# Patient Record
Sex: FEMALE | Race: OTHER | URBAN - METROPOLITAN AREA
[De-identification: names, ages, dates, MRNs, and addresses within clinical notes are randomized per-mention and may not be internally consistent; named-entity substitution may affect disease eponyms.]

---

## 2022-01-13 ENCOUNTER — INPATIENT (INPATIENT)
Facility: HOSPITAL | Age: 37
LOS: 11 days | Discharge: HOME | DRG: 270 | End: 2022-01-25
Attending: INTERNAL MEDICINE | Admitting: HOSPITALIST
Payer: MEDICAID

## 2022-01-13 VITALS
SYSTOLIC BLOOD PRESSURE: 98 MMHG | WEIGHT: 217.38 LBS | RESPIRATION RATE: 18 BRPM | DIASTOLIC BLOOD PRESSURE: 60 MMHG | HEIGHT: 61 IN | HEART RATE: 127 BPM

## 2022-01-13 LAB
ALBUMIN SERPL ELPH-MCNC: 3.5 G/DL — SIGNIFICANT CHANGE UP (ref 3.3–5)
ALP SERPL-CCNC: 246 U/L — HIGH (ref 40–120)
ALT FLD-CCNC: 353 U/L — HIGH (ref 10–45)
ANION GAP SERPL CALC-SCNC: 13 MMOL/L — SIGNIFICANT CHANGE UP (ref 5–17)
APTT BLD: 27.8 SEC — SIGNIFICANT CHANGE UP (ref 27.5–35.5)
AST SERPL-CCNC: 138 U/L — HIGH (ref 10–40)
BASOPHILS # BLD AUTO: 0.02 K/UL — SIGNIFICANT CHANGE UP (ref 0–0.2)
BASOPHILS NFR BLD AUTO: 0.2 % — SIGNIFICANT CHANGE UP (ref 0–2)
BILIRUB SERPL-MCNC: 0.6 MG/DL — SIGNIFICANT CHANGE UP (ref 0.2–1.2)
BLD GP AB SCN SERPL QL: NEGATIVE — SIGNIFICANT CHANGE UP
BLD GP AB SCN SERPL QL: NEGATIVE — SIGNIFICANT CHANGE UP
BUN SERPL-MCNC: 13 MG/DL — SIGNIFICANT CHANGE UP (ref 7–23)
CALCIUM SERPL-MCNC: 8.4 MG/DL — SIGNIFICANT CHANGE UP (ref 8.4–10.5)
CHLORIDE SERPL-SCNC: 97 MMOL/L — SIGNIFICANT CHANGE UP (ref 96–108)
CK MB CFR SERPL CALC: 84.8 NG/ML — HIGH (ref 0–6.7)
CK SERPL-CCNC: 473 U/L — HIGH (ref 25–170)
CO2 SERPL-SCNC: 19 MMOL/L — LOW (ref 22–31)
CREAT SERPL-MCNC: 0.72 MG/DL — SIGNIFICANT CHANGE UP (ref 0.5–1.3)
CRP SERPL-MCNC: 114.6 MG/L — HIGH (ref 0–4)
D DIMER BLD IA.RAPID-MCNC: 680 NG/ML DDU — HIGH
EOSINOPHIL # BLD AUTO: 0.39 K/UL — SIGNIFICANT CHANGE UP (ref 0–0.5)
EOSINOPHIL NFR BLD AUTO: 4 % — SIGNIFICANT CHANGE UP (ref 0–6)
ERYTHROCYTE [SEDIMENTATION RATE] IN BLOOD: 72 MM/HR — HIGH
GLUCOSE SERPL-MCNC: 118 MG/DL — HIGH (ref 70–99)
HCG SERPL-ACNC: <0 MIU/ML — SIGNIFICANT CHANGE UP
HCT VFR BLD CALC: 31.3 % — LOW (ref 34.5–45)
HGB BLD-MCNC: 9.9 G/DL — LOW (ref 11.5–15.5)
IMM GRANULOCYTES NFR BLD AUTO: 0.4 % — SIGNIFICANT CHANGE UP (ref 0–1.5)
INR BLD: 1.45 — HIGH (ref 0.88–1.16)
LACTATE SERPL-SCNC: 2.4 MMOL/L — HIGH (ref 0.5–2)
LYMPHOCYTES # BLD AUTO: 1.89 K/UL — SIGNIFICANT CHANGE UP (ref 1–3.3)
LYMPHOCYTES # BLD AUTO: 19.4 % — SIGNIFICANT CHANGE UP (ref 13–44)
MAGNESIUM SERPL-MCNC: 2.5 MG/DL — SIGNIFICANT CHANGE UP (ref 1.6–2.6)
MCHC RBC-ENTMCNC: 28.2 PG — SIGNIFICANT CHANGE UP (ref 27–34)
MCHC RBC-ENTMCNC: 31.6 GM/DL — LOW (ref 32–36)
MCV RBC AUTO: 89.2 FL — SIGNIFICANT CHANGE UP (ref 80–100)
MONOCYTES # BLD AUTO: 0.68 K/UL — SIGNIFICANT CHANGE UP (ref 0–0.9)
MONOCYTES NFR BLD AUTO: 7 % — SIGNIFICANT CHANGE UP (ref 2–14)
NEUTROPHILS # BLD AUTO: 6.72 K/UL — SIGNIFICANT CHANGE UP (ref 1.8–7.4)
NEUTROPHILS NFR BLD AUTO: 69 % — SIGNIFICANT CHANGE UP (ref 43–77)
NRBC # BLD: 0 /100 WBCS — SIGNIFICANT CHANGE UP (ref 0–0)
NT-PROBNP SERPL-SCNC: HIGH PG/ML (ref 0–300)
PHOSPHATE SERPL-MCNC: 2.7 MG/DL — SIGNIFICANT CHANGE UP (ref 2.5–4.5)
PLATELET # BLD AUTO: 421 K/UL — HIGH (ref 150–400)
POTASSIUM SERPL-MCNC: 4.2 MMOL/L — SIGNIFICANT CHANGE UP (ref 3.5–5.3)
POTASSIUM SERPL-SCNC: 4.2 MMOL/L — SIGNIFICANT CHANGE UP (ref 3.5–5.3)
PROT SERPL-MCNC: 6.7 G/DL — SIGNIFICANT CHANGE UP (ref 6–8.3)
PROTHROM AB SERPL-ACNC: 17.1 SEC — HIGH (ref 10.6–13.6)
RBC # BLD: 3.51 M/UL — LOW (ref 3.8–5.2)
RBC # FLD: 15.9 % — HIGH (ref 10.3–14.5)
RH IG SCN BLD-IMP: POSITIVE — SIGNIFICANT CHANGE UP
RH IG SCN BLD-IMP: POSITIVE — SIGNIFICANT CHANGE UP
SODIUM SERPL-SCNC: 129 MMOL/L — LOW (ref 135–145)
TROPONIN T SERPL-MCNC: 5.83 NG/ML — CRITICAL HIGH (ref 0–0.01)
WBC # BLD: 9.74 K/UL — SIGNIFICANT CHANGE UP (ref 3.8–10.5)
WBC # FLD AUTO: 9.74 K/UL — SIGNIFICANT CHANGE UP (ref 3.8–10.5)

## 2022-01-13 PROCEDURE — 71045 X-RAY EXAM CHEST 1 VIEW: CPT | Mod: 26

## 2022-01-13 PROCEDURE — 99291 CRITICAL CARE FIRST HOUR: CPT

## 2022-01-13 PROCEDURE — 99292 CRITICAL CARE ADDL 30 MIN: CPT

## 2022-01-13 RX ORDER — DEXTROSE 50 % IN WATER 50 %
15 SYRINGE (ML) INTRAVENOUS ONCE
Refills: 0 | Status: DISCONTINUED | OUTPATIENT
Start: 2022-01-13 | End: 2022-01-22

## 2022-01-13 RX ORDER — DEXTROSE 50 % IN WATER 50 %
25 SYRINGE (ML) INTRAVENOUS ONCE
Refills: 0 | Status: DISCONTINUED | OUTPATIENT
Start: 2022-01-13 | End: 2022-01-22

## 2022-01-13 RX ORDER — CHLORHEXIDINE GLUCONATE 213 G/1000ML
1 SOLUTION TOPICAL
Refills: 0 | Status: DISCONTINUED | OUTPATIENT
Start: 2022-01-13 | End: 2022-01-22

## 2022-01-13 RX ORDER — ONDANSETRON 8 MG/1
4 TABLET, FILM COATED ORAL ONCE
Refills: 0 | Status: COMPLETED | OUTPATIENT
Start: 2022-01-13 | End: 2022-01-13

## 2022-01-13 RX ORDER — PANTOPRAZOLE SODIUM 20 MG/1
40 TABLET, DELAYED RELEASE ORAL
Refills: 0 | Status: DISCONTINUED | OUTPATIENT
Start: 2022-01-13 | End: 2022-01-25

## 2022-01-13 RX ORDER — GLUCAGON INJECTION, SOLUTION 0.5 MG/.1ML
1 INJECTION, SOLUTION SUBCUTANEOUS ONCE
Refills: 0 | Status: DISCONTINUED | OUTPATIENT
Start: 2022-01-13 | End: 2022-01-22

## 2022-01-13 RX ORDER — SIMETHICONE 80 MG/1
80 TABLET, CHEWABLE ORAL ONCE
Refills: 0 | Status: COMPLETED | OUTPATIENT
Start: 2022-01-13 | End: 2022-01-14

## 2022-01-13 RX ORDER — SODIUM CHLORIDE 9 MG/ML
1000 INJECTION, SOLUTION INTRAVENOUS
Refills: 0 | Status: DISCONTINUED | OUTPATIENT
Start: 2022-01-13 | End: 2022-01-22

## 2022-01-13 RX ORDER — SODIUM NITROPRUSSIDE 50 MG/2ML
0.5 INJECTION INTRAVENOUS
Qty: 100 | Refills: 0 | Status: DISCONTINUED | OUTPATIENT
Start: 2022-01-13 | End: 2022-01-14

## 2022-01-13 RX ORDER — INFLUENZA VIRUS VACCINE 15; 15; 15; 15 UG/.5ML; UG/.5ML; UG/.5ML; UG/.5ML
0.5 SUSPENSION INTRAMUSCULAR ONCE
Refills: 0 | Status: COMPLETED | OUTPATIENT
Start: 2022-01-13 | End: 2022-01-13

## 2022-01-13 RX ORDER — DEXTROSE 50 % IN WATER 50 %
12.5 SYRINGE (ML) INTRAVENOUS ONCE
Refills: 0 | Status: DISCONTINUED | OUTPATIENT
Start: 2022-01-13 | End: 2022-01-22

## 2022-01-13 RX ADMIN — SODIUM NITROPRUSSIDE 7.4 MICROGRAM(S)/KG/MIN: 50 INJECTION INTRAVENOUS at 22:19

## 2022-01-13 RX ADMIN — ONDANSETRON 4 MILLIGRAM(S): 8 TABLET, FILM COATED ORAL at 20:55

## 2022-01-13 NOTE — PATIENT PROFILE ADULT - FALL HARM RISK - HARM RISK INTERVENTIONS

## 2022-01-13 NOTE — H&P ADULT - NSHPPHYSICALEXAM_GEN_ALL_CORE
Gen: NAD, laying in bed, weak-appearing, speaking in full sentences  HEENT: PERRL, anicteric sclera, no JVD, no thyromegaly  Cardio: +S1/S2, tachycardic, no murmurs  Resp: decreased breath sounds over right base  GI: +BS x4, NT/ND  Ext: b/l LE edema  Vasc: 2+ peripheral pulses, lower extremities cool   Neuro: AAOx3, no focal deficits Gen: NAD, laying in bed, weak-appearing, speaking in full sentences  HEENT: PERRL, anicteric sclera, no JVD, no thyromegaly  Cardio: +S1/S2, tachycardic, no murmurs  Resp: decreased breath sounds over right base  GI: +BS x4, ND, mild RUQ tenderness  Ext: b/l LE edema  Vasc: 2+ peripheral pulses, lower extremities cool   Neuro: AAOx3, no focal deficits

## 2022-01-13 NOTE — H&P ADULT - HISTORY OF PRESENT ILLNESS
35 yo F w/ a PMHx of      Hgb9.9, plt 421, Na 129, Bicarb 19, BUN13, Cr 0.72, , , , Lactate 2.4, .6, , CKMB 84.8, Trop T 5.83     35 yo F w/ no PMHx prese    2 weeks ago, pt started having back pain (worsened by lying down and sitting up) which prompted her to go to the ED. She was treated for a PNA and discharged home. Sxs did not improve. 2 days ago, she started having epigastric pain, myalgias, chills, cough, fever, and generalized fatigue. Took her BP at home, it was low which promoted her to return to the ED. In the ED, febrile to 102.4, tachycardic to 140, with EKG showing diffuse ST elevations and tropinemia (35286-->9524). Admitted for cardiac workup. Echo (1/10/22) revealed EF 30-35% with pericardial effusion. Transferred to [facility] for further work up.      Vitals: T98.4, , BP 92/60 (MAP 72), RR 20, sPO2 100% (2L NC)  Labs: Hgb9.9, plt 421, Na 129, Bicarb 19, BUN13, Cr 0.72, , , , Lactate 2.4, .6, ESR 72, , CKMB 84.8, Trop T 5.83  Imaging:    EKG: Sinus tachycardia, with diffuse ST elevations, ST depressions in aVR and V1    CXR: Small right-sided effusion, Cardiomegaly.  Transfer from [place]  35 yo F w/ no PMHx presents  . 2 weeks ago, pt started having back pain (worsened by lying down and sitting up) which prompted her to go to the ED. She was treated for a PNA and discharged home. Sxs did not improve. 2 days ago, she started having epigastric pain, myalgias, chills, cough, fever, and generalized fatigue. Took her BP at home, it was low which promoted her to return to the ED. In the ED, febrile to 102.4, tachycardic to 140, with EKG showing diffuse ST elevations and tropinemia (99732-->9524). Admitted for cardiac workup. Echo (1/10/22) revealed EF 30-35% with pericardial effusion. Transferred to [facility] for further work up. Pt reports left sided non-radiating chest pain (scaled 3/10) worsened with coughing. Also endorses weakness, fatigue, epigastric pain, nausea, leg swelling (which started today). Denies orthopnea, PND. Reports heat intolerance, and tachycardia which started 2 weeks ago, and irregular menstrual periods. Currently menstruating with heavy flow soaking 5 pads per day.       Vitals: T98.4, , BP 92/60 (MAP 72), RR 20, sPO2 100% (2L NC)  Labs: Hgb9.9, plt 421, Na 129, Bicarb 19, BUN13, Cr 0.72, , , , Lactate 2.4, .6, ESR 72, , CKMB 84.8, Trop T 5.83  Imaging:    EKG: Sinus tachycardia, with diffuse ST elevations, ST depressions in aVR and V1    CXR: Small right-sided effusion, Cardiomegaly.  Transfer from [place]  37 yo F w/ no PMHx presents from [place] for further cardiac work up. 2 weeks ago, pt started having back pain (worsened by lying down and sitting up) which prompted her to go to the ED. She was treated for a PNA (azithromycin) and discharged home. Sxs did not improve. 2 days ago, she started having epigastric pain, myalgias, chills, cough, fever, and generalized fatigue. Took her BP at home, it was low which promoted her to return to the ED. In the ED, febrile to 102.4, tachycardic to 140, with EKG showing diffuse ST elevations and tropinemia (21262-->9524). Admitted for cardiac workup. Echo (1/10/22) revealed EF 30-35% with pericardial effusion. Admitted for perimyocarditis and then transferred to [facility] for further work up. Upon arrival, pt reports left sided non-radiating chest pain (scaled 3/10) worsened with coughing. Also endorses weakness, fatigue, epigastric pain, nausea, leg swelling (R>>L, which started yesterday). Denies orthopnea, PND. Reports heat intolerance, and tachycardia which started 2 weeks ago, and irregular menstrual periods. Currently menstruating with heavy flow soaking 5 pads per day, and has gotten her period twice this month. Denies hematemesis, melena, hematochezia and hematuria. ROS otherwise negative. Of note pt denies any personal family hx of any autoimmune, thyroid or liver disorders. Pt also denies any sick contacts or recent travel. Pt is unvaccinated for COVID.       Vitals: T98.4, , BP 92/60 (MAP 72), RR 20, sPO2 100% (2L NC)  Labs: Hgb9.9, plt 421, Na 129, Bicarb 19, BUN13, Cr 0.72, , , , Lactate 2.4, .6, ESR 72, , CKMB 84.8, Trop T 5.83  Imaging:    EKG: Sinus tachycardia, with diffuse ST elevations, ST depressions in aVR and V1    CXR: Small right-sided effusion, Cardiomegaly.  Transfer from [place]  35 yo F w/ no PMHx presents from [place] for further cardiac work up. 2 weeks ago, pt started having back pain (worsened by lying down and sitting up) which prompted her to go to the ED. She was treated for a PNA (azithromycin) and discharged home. Sxs did not improve. 2 days ago, she started having epigastric pain, myalgias, chills, cough, fever, and generalized fatigue. Took her BP at home, it was low which promoted her to return to the ED. In the ED, febrile to 102.4, tachycardic to 140, with EKG showing diffuse ST elevations and tropinemia (16842-->9524). Admitted for cardiac workup. Echo (1/10/22) revealed EF 30-35% with pericardial effusion. Admitted for perimyocarditis and then transferred to [facility] for further work up. Upon arrival, pt reports left sided non-radiating chest pain (scaled 3/10) worsened with coughing. Also endorses weakness, fatigue, epigastric pain, nausea, leg swelling (R>>L, which started yesterday). Denies orthopnea, PND. Reports heat intolerance, and tachycardia which started 2 weeks ago, and irregular menstrual periods. Currently menstruating with heavy flow soaking 5 pads per day, and has gotten her period twice this month. Denies hematemesis, melena, hematochezia and hematuria. ROS otherwise negative. Of note pt denies any personal family hx of any autoimmune, thyroid or liver disorders. Pt also denies any sick contacts or recent travel. Pt is unvaccinated for COVID.       Vitals: T98.4, , BP 92/60 (MAP 72), RR 20, sPO2 100% (2L NC)  Labs: Hgb9.9, plt 421, Na 129, Bicarb 19, BUN13, Cr 0.72, , , , Lactate 2.4, .6, ESR 72, , CKMB 84.8, Trop T 5.83, BNP 65991.  Imaging:    EKG: Sinus tachycardia, with diffuse ST elevations, ST depressions in aVR and V1    CXR: Small right-sided effusion, Cardiomegaly.  Transfer from [place]

## 2022-01-13 NOTE — H&P ADULT - ASSESSMENT
35 yo F w/ no PMHx admitted to [place] for perimyocarditis and then transferred to [facility] for further work up.     Neuro  No active issues 35 yo F w/ no PMHx admitted to [place] for perimyocarditis and then transferred to [facility] for further work up.     Neuro  No active issues    Cardiac  #Cardiogenic shock  Lactate 2.4    #Perimyocarditis     Pulm  #r/o PE  Pleuritic chest pain, tachycardia, immobilization during hospitalization. Subjectively R>L lower extremity swelling. D-dimer 680. Right sided pleural effusion.  -f/u doppler LEs  -continue to monitor respiratory status     #Pleural effusion  CXR reveals small right-sided pleural effusion. Likely 2/2 congestion i/s/o cardiogenic shock, r/o PE  -See plan above (cardiogenic shock, r/o PE)  -Continue to monitor     Renal  #Hyponatremia  Na 129. AOx3.   -Continue to monitor    GI  #Transaminitis     Heme-Onc  #Anemia     Endocrine  No active issues    ID  No active issues    Other  F: tolerating PO, no IVF  E: replete K<4, Mg<2  N: NPO after midnight (possible cardiac intervention)    VTE Prophylaxis: SCDs (possible cardiac intervention)  GI: Pantoprazole 40mg PO daily  C: Full Code  D: CCU   37 yo F w/ no PMHx admitted to [place] for perimyocarditis and then transferred to [facility] for further work up.     Neuro  No active issues    Cardiac  #Cardiogenic shock  Echo at [place] (1/10/22) revealed EF 30-35% with pericardial effusion  Lactate 2.4. Pro-BNP 41595. Elevated LFTs.  Likely 2/2 perimyocarditis  -Nipride gtt for afterload reduction (Goal MAP >65) --no inotropes as pt tachycardic   -Diuresis prn  -consider ischemic evaluation    #Perimyocarditis   Pleuritic chest pain with associated myalgias, chills, cough, fever, and generalized fatigue  , CKMB 84.8, Trop T 5.83  .6, ESR 72  EKG with diffuse ST elevations, and ST depressions in leads aVR and V1  Etiology likely viral versus autoimmune  -f/u RVP, COVID Ab's, COVID PCR, Coxsackie, HIV  -f/u MILAN  -f/u Urine Legionella, Urine Streptococcus  -f/u Urine drug screen  -f/u TSH  -colchicine???    Pulm  #r/o PE  Pleuritic chest pain, tachycardia, immobilization during previous hospitalization. Subjectively R>L lower extremity swelling. D-dimer 680. Right sided pleural effusion.  -f/u doppler LEs  -continue to monitor respiratory status     #Pleural effusion  CXR reveals small right-sided pleural effusion. Likely 2/2 congestion i/s/o cardiogenic shock, r/o PE  -See plan above (cardiogenic shock, r/o PE)  -Continue to monitor     Renal  #Hyponatremia  Na 129. Asx. AOx3.   -Continue to monitor BNP  -If pt becomes symptomatic, consider ordering serum/urine osmolality and urine sodium    GI  #Transaminitis   , , . Likely 2/2 congestive hepatopathy versus ischemia i/s/o cardiogenic shock  -continue to monitor  -f/u abdominal ultrasound  -f/u acetaminophen level  -f/u hepatitis panel    Heme-Onc  #Anemia   Hbg 9.9. No hx of anemia. Menstruated twice this month. No other signs of bleeding.   - f/u iron studies  - trend CBC  - maintain active T&S  - transfuse for Hgb <7     Endocrine  No active issues    ID  No active issues    Other  F: tolerating PO, no IVF  E: replete K<4, Mg<2  N: NPO after midnight (possible cardiac intervention)    VTE Prophylaxis: SCDs (possible cardiac intervention)  GI: Pantoprazole 40mg PO daily  C: Full Code  D: CCU   37 yo F w/ no PMHx admitted to [place] for perimyocarditis and then transferred to [facility] for further work up.    Neuro  No active issues    Cardiac  #Cardiogenic shock  Echo at [place] (1/10/22) revealed EF 30-35% with pericardial effusion  Lactate 2.4. Pro-BNP 13842. Elevated LFTs.  Likely 2/2 perimyocarditis  -Nipride gtt for afterload reduction (Goal MAP >65) --no inotropes as pt tachycardic   -Diuresis prn  -consider ischemic evaluation  -f/u echo     #Perimyocarditis   Pleuritic chest pain with associated myalgias, chills, cough, fever, and generalized fatigue  , CKMB 84.8, Trop T 5.83  .6, ESR 72  EKG with diffuse ST elevations, and ST depressions in leads aVR and V1  Etiology likely viral versus autoimmune  -f/u RVP, COVID Ab's, COVID PCR, Coxsackie, HIV  -f/u MILAN  -f/u Urine Legionella, Urine Streptococcus  -f/u Urine drug screen  -f/u TSH  -consider colchicine if chest pain worsens    Pulm  #r/o PE  Pleuritic chest pain, tachycardia, immobilization during previous hospitalization. Subjectively R>L lower extremity swelling. D-dimer 680. Right sided pleural effusion.  -f/u doppler LEs  -continue to monitor respiratory status     #Pleural effusion  CXR reveals small right-sided pleural effusion. Likely 2/2 congestion i/s/o cardiogenic shock, r/o PE  -See plan above (cardiogenic shock, r/o PE)  -Continue to monitor     Renal  #Hyponatremia  Na 129. Asx. AOx3.   -Continue to monitor BNP  -If pt becomes symptomatic, consider ordering serum/urine osmolality and urine sodium    GI  #Transaminitis   , , . Likely 2/2 congestive hepatopathy versus ischemia i/s/o cardiogenic shock  -continue to monitor  -f/u abdominal ultrasound  -f/u acetaminophen level  -f/u hepatitis panel    Heme-Onc  #Anemia   Hbg 9.9. No hx of anemia. Menstruated twice this month. No other signs of bleeding.   - f/u iron studies  - trend CBC  - maintain active T&S  - transfuse for Hgb <7     Endocrine  No active issues    ID  No active issues    Other  F: tolerating PO, no IVF  E: replete K<4, Mg<2  N: NPO after midnight (possible cardiac intervention)    VTE Prophylaxis: SCDs (possible cardiac intervention)  GI: Pantoprazole 40mg PO daily  C: Full Code  D: CCU   35 yo F w/ no PMHx admitted to [place] for perimyocarditis and then transferred to [facility] for further work up.    Neuro  No active issues    Cardiac  #Cardiogenic shock  Echo at [place] (1/10/22) revealed EF 30-35% with pericardial effusion  Lactate 2.4. Pro-BNP 03625. Elevated LFTs. Cool on exam. Hypotensive with likely compensatory tachycardia  Likely 2/2 perimyocarditis  -Nipride gtt for afterload reduction (Goal MAP >65) --no inotropes as pt tachycardic   -Diuresis prn  -consider ischemic evaluation  -f/u echo   -strict I/Os    #Perimyocarditis   Pleuritic chest pain with associated myalgias, chills, cough, fever, and generalized fatigue  , CKMB 84.8, Trop T 5.83  .6, ESR 72  EKG with diffuse ST elevations, and ST depressions in leads aVR and V1  Etiology likely viral versus autoimmune  -f/u RVP, COVID Ab's, COVID PCR, Coxsackie, HIV  -f/u MILAN  -f/u Urine Legionella, Urine Streptococcus  -f/u Urine drug screen  -f/u TSH  -consider colchicine if chest pain worsens    Pulm  #r/o PE  Pleuritic chest pain, tachycardia, immobilization during previous hospitalization. Wells 3 (HR, immobilization). Subjectively R>L lower extremity swelling. D-dimer 680. Right sided pleural effusion.  -f/u doppler LEs  -continue to monitor respiratory status     #Pleural effusion  CXR reveals small right-sided pleural effusion. Likely 2/2 congestion i/s/o cardiogenic shock, r/o PE  -See plan above (cardiogenic shock, r/o PE)  -Continue to monitor     Renal  #Hyponatremia  Na 129. Asx. AOx3.   -Continue to monitor BNP  -If pt becomes symptomatic, consider ordering serum/urine osmolality and urine sodium    GI  #Transaminitis   , , . Likely 2/2 congestive hepatopathy versus ischemia i/s/o cardiogenic shock  -continue to monitor  -f/u abdominal ultrasound  -f/u acetaminophen level  -f/u hepatitis panel    Heme-Onc  #Anemia   Hbg 9.9. No hx of anemia. Currently menstruating. No other signs of bleeding.   - f/u iron studies  - trend CBC  - maintain active T&S  - transfuse for Hgb <7     Endocrine  No active issues    ID  No active issues    Other  F: tolerating PO, no IVF  E: replete K<4, Mg<2  N: NPO after midnight (possible cardiac intervention)    VTE Prophylaxis: SCDs (possible cardiac intervention)  GI: Pantoprazole 40mg PO daily  C: Full Code  D: CCU

## 2022-01-13 NOTE — H&P ADULT - NSHPLABSRESULTS_GEN_ALL_CORE
.  LABS:                         9.9    9.74  )-----------( 421      ( 13 Jan 2022 19:29 )             31.3     01-13    129<L>  |  97  |  13  ----------------------------<  118<H>  4.2   |  19<L>  |  0.72    Ca    8.4      13 Jan 2022 19:29  Phos  2.7     01-13  Mg     2.5     01-13    TPro  6.7  /  Alb  3.5  /  TBili  0.6  /  DBili  x   /  AST  138<H>  /  ALT  353<H>  /  AlkPhos  246<H>  01-13    PT/INR - ( 13 Jan 2022 19:29 )   PT: 17.1 sec;   INR: 1.45          PTT - ( 13 Jan 2022 19:29 )  PTT:27.8 sec    CARDIAC MARKERS ( 13 Jan 2022 19:29 )  x     / 5.83 ng/mL / 473 U/L / x     / 84.8 ng/mL        Lactate, Blood: 2.4 mmol/L (01-13 @ 19:26)      RADIOLOGY, EKG & ADDITIONAL TESTS: Reviewed.

## 2022-01-13 NOTE — H&P ADULT - NSHPSOCIALHISTORY_GEN_ALL_CORE
Tobacco: None  EtOH: Socially  Recreational drug Use: None  Living situation: Lives with father and 2 children

## 2022-01-14 DIAGNOSIS — I30.9 ACUTE PERICARDITIS, UNSPECIFIED: ICD-10-CM

## 2022-01-14 DIAGNOSIS — R74.01 ELEVATION OF LEVELS OF LIVER TRANSAMINASE LEVELS: ICD-10-CM

## 2022-01-14 DIAGNOSIS — I50.21 ACUTE SYSTOLIC (CONGESTIVE) HEART FAILURE: ICD-10-CM

## 2022-01-14 LAB
A1C WITH ESTIMATED AVERAGE GLUCOSE RESULT: 5.2 % — SIGNIFICANT CHANGE UP (ref 4–5.6)
ALBUMIN SERPL ELPH-MCNC: 3.3 G/DL — SIGNIFICANT CHANGE UP (ref 3.3–5)
ALBUMIN SERPL ELPH-MCNC: 3.6 G/DL — SIGNIFICANT CHANGE UP (ref 3.3–5)
ALP SERPL-CCNC: 200 U/L — HIGH (ref 40–120)
ALP SERPL-CCNC: 237 U/L — HIGH (ref 40–120)
ALT FLD-CCNC: 286 U/L — HIGH (ref 10–45)
ALT FLD-CCNC: 321 U/L — HIGH (ref 10–45)
AMPHET UR-MCNC: NEGATIVE — SIGNIFICANT CHANGE UP
ANION GAP SERPL CALC-SCNC: 12 MMOL/L — SIGNIFICANT CHANGE UP (ref 5–17)
ANION GAP SERPL CALC-SCNC: 15 MMOL/L — SIGNIFICANT CHANGE UP (ref 5–17)
APTT BLD: 27.1 SEC — LOW (ref 27.5–35.5)
AST SERPL-CCNC: 113 U/L — HIGH (ref 10–40)
AST SERPL-CCNC: 91 U/L — HIGH (ref 10–40)
BARBITURATES UR SCN-MCNC: NEGATIVE — SIGNIFICANT CHANGE UP
BASE EXCESS BLDA CALC-SCNC: -0.2 MMOL/L — SIGNIFICANT CHANGE UP (ref -2–3)
BASE EXCESS BLDMV CALC-SCNC: 1.2 MMOL/L — SIGNIFICANT CHANGE UP
BASOPHILS # BLD AUTO: 0.02 K/UL — SIGNIFICANT CHANGE UP (ref 0–0.2)
BASOPHILS NFR BLD AUTO: 0.2 % — SIGNIFICANT CHANGE UP (ref 0–2)
BENZODIAZ UR-MCNC: NEGATIVE — SIGNIFICANT CHANGE UP
BILIRUB SERPL-MCNC: 0.6 MG/DL — SIGNIFICANT CHANGE UP (ref 0.2–1.2)
BILIRUB SERPL-MCNC: 0.7 MG/DL — SIGNIFICANT CHANGE UP (ref 0.2–1.2)
BUN SERPL-MCNC: 13 MG/DL — SIGNIFICANT CHANGE UP (ref 7–23)
BUN SERPL-MCNC: 14 MG/DL — SIGNIFICANT CHANGE UP (ref 7–23)
CALCIUM SERPL-MCNC: 8.3 MG/DL — LOW (ref 8.4–10.5)
CALCIUM SERPL-MCNC: 8.4 MG/DL — SIGNIFICANT CHANGE UP (ref 8.4–10.5)
CHLORIDE SERPL-SCNC: 95 MMOL/L — LOW (ref 96–108)
CHLORIDE SERPL-SCNC: 96 MMOL/L — SIGNIFICANT CHANGE UP (ref 96–108)
CK MB CFR SERPL CALC: 72.3 NG/ML — HIGH (ref 0–6.7)
CK MB CFR SERPL CALC: 78.2 NG/ML — HIGH (ref 0–6.7)
CK SERPL-CCNC: 371 U/L — HIGH (ref 25–170)
CK SERPL-CCNC: 438 U/L — HIGH (ref 25–170)
CO2 BLDA-SCNC: 23 MMOL/L — SIGNIFICANT CHANGE UP (ref 19–24)
CO2 SERPL-SCNC: 20 MMOL/L — LOW (ref 22–31)
CO2 SERPL-SCNC: 21 MMOL/L — LOW (ref 22–31)
COCAINE METAB.OTHER UR-MCNC: NEGATIVE — SIGNIFICANT CHANGE UP
COHGB MFR BLDMV: 1.1 % — SIGNIFICANT CHANGE UP
CREAT SERPL-MCNC: 0.71 MG/DL — SIGNIFICANT CHANGE UP (ref 0.5–1.3)
CREAT SERPL-MCNC: 0.72 MG/DL — SIGNIFICANT CHANGE UP (ref 0.5–1.3)
EOSINOPHIL # BLD AUTO: 0.17 K/UL — SIGNIFICANT CHANGE UP (ref 0–0.5)
EOSINOPHIL NFR BLD AUTO: 1.7 % — SIGNIFICANT CHANGE UP (ref 0–6)
ESTIMATED AVERAGE GLUCOSE: 103 MG/DL — SIGNIFICANT CHANGE UP (ref 68–114)
FERRITIN SERPL-MCNC: 70 NG/ML — SIGNIFICANT CHANGE UP (ref 15–150)
GLUCOSE BLDC GLUCOMTR-MCNC: 167 MG/DL — HIGH (ref 70–99)
GLUCOSE SERPL-MCNC: 125 MG/DL — HIGH (ref 70–99)
GLUCOSE SERPL-MCNC: 146 MG/DL — HIGH (ref 70–99)
HAV IGM SER-ACNC: SIGNIFICANT CHANGE UP
HBV CORE AB SER-ACNC: SIGNIFICANT CHANGE UP
HBV CORE IGM SER-ACNC: SIGNIFICANT CHANGE UP
HBV SURFACE AB SER-ACNC: SIGNIFICANT CHANGE UP
HBV SURFACE AG SER-ACNC: SIGNIFICANT CHANGE UP
HCO3 BLDA-SCNC: 22 MMOL/L — SIGNIFICANT CHANGE UP (ref 21–28)
HCO3 BLDMV-SCNC: 24.9 MMOL/L — SIGNIFICANT CHANGE UP
HCT VFR BLD CALC: 29 % — LOW (ref 34.5–45)
HCT VFR BLD CALC: 30 % — LOW (ref 34.5–45)
HCV AB S/CO SERPL IA: 0.04 S/CO — SIGNIFICANT CHANGE UP
HCV AB SERPL-IMP: SIGNIFICANT CHANGE UP
HGB BLD-MCNC: 9.5 G/DL — LOW (ref 11.5–15.5)
HGB BLD-MCNC: 9.5 G/DL — LOW (ref 11.5–15.5)
HGB FLD-MCNC: 9.8 G/DL — SIGNIFICANT CHANGE UP (ref 11.7–16.1)
IMM GRANULOCYTES NFR BLD AUTO: 0.6 % — SIGNIFICANT CHANGE UP (ref 0–1.5)
IRON SATN MFR SERPL: 14 % — SIGNIFICANT CHANGE UP (ref 14–50)
IRON SATN MFR SERPL: 46 UG/DL — SIGNIFICANT CHANGE UP (ref 30–160)
LACTATE SERPL-SCNC: 2.1 MMOL/L — HIGH (ref 0.5–2)
LACTATE SERPL-SCNC: 2.2 MMOL/L — HIGH (ref 0.5–2)
LACTATE SERPL-SCNC: 2.3 MMOL/L — HIGH (ref 0.5–2)
LEGIONELLA AG UR QL: NEGATIVE — SIGNIFICANT CHANGE UP
LYMPHOCYTES # BLD AUTO: 1.33 K/UL — SIGNIFICANT CHANGE UP (ref 1–3.3)
LYMPHOCYTES # BLD AUTO: 13.5 % — SIGNIFICANT CHANGE UP (ref 13–44)
MAGNESIUM SERPL-MCNC: 2.4 MG/DL — SIGNIFICANT CHANGE UP (ref 1.6–2.6)
MCHC RBC-ENTMCNC: 27.9 PG — SIGNIFICANT CHANGE UP (ref 27–34)
MCHC RBC-ENTMCNC: 28.7 PG — SIGNIFICANT CHANGE UP (ref 27–34)
MCHC RBC-ENTMCNC: 31.7 GM/DL — LOW (ref 32–36)
MCHC RBC-ENTMCNC: 32.8 GM/DL — SIGNIFICANT CHANGE UP (ref 32–36)
MCV RBC AUTO: 87.6 FL — SIGNIFICANT CHANGE UP (ref 80–100)
MCV RBC AUTO: 88.2 FL — SIGNIFICANT CHANGE UP (ref 80–100)
METHADONE UR-MCNC: NEGATIVE — SIGNIFICANT CHANGE UP
METHGB MFR BLDMV: 0.4 % — SIGNIFICANT CHANGE UP
MONOCYTES # BLD AUTO: 0.63 K/UL — SIGNIFICANT CHANGE UP (ref 0–0.9)
MONOCYTES NFR BLD AUTO: 6.4 % — SIGNIFICANT CHANGE UP (ref 2–14)
NEUTROPHILS # BLD AUTO: 7.64 K/UL — HIGH (ref 1.8–7.4)
NEUTROPHILS NFR BLD AUTO: 77.6 % — HIGH (ref 43–77)
NRBC # BLD: 0 /100 WBCS — SIGNIFICANT CHANGE UP (ref 0–0)
NRBC # BLD: 1 /100 WBCS — HIGH (ref 0–0)
O2 CT VFR BLD CALC: 24 MMHG — SIGNIFICANT CHANGE UP
OPIATES UR-MCNC: NEGATIVE — SIGNIFICANT CHANGE UP
OXYHGB MFR BLDMV: 38.5 % — SIGNIFICANT CHANGE UP
PCO2 BLDA: 28 MMHG — LOW (ref 32–35)
PCO2 BLDMV: 35 MMHG — SIGNIFICANT CHANGE UP
PCP SPEC-MCNC: SIGNIFICANT CHANGE UP
PCP UR-MCNC: NEGATIVE — SIGNIFICANT CHANGE UP
PH BLDA: 7.5 — HIGH (ref 7.35–7.45)
PH BLDMV: 7.46 — SIGNIFICANT CHANGE UP
PHOSPHATE SERPL-MCNC: 2.8 MG/DL — SIGNIFICANT CHANGE UP (ref 2.5–4.5)
PLATELET # BLD AUTO: 421 K/UL — HIGH (ref 150–400)
PLATELET # BLD AUTO: 436 K/UL — HIGH (ref 150–400)
PO2 BLDA: 62 MMHG — LOW (ref 83–108)
POTASSIUM SERPL-MCNC: 4.2 MMOL/L — SIGNIFICANT CHANGE UP (ref 3.5–5.3)
POTASSIUM SERPL-MCNC: 4.2 MMOL/L — SIGNIFICANT CHANGE UP (ref 3.5–5.3)
POTASSIUM SERPL-SCNC: 4.2 MMOL/L — SIGNIFICANT CHANGE UP (ref 3.5–5.3)
POTASSIUM SERPL-SCNC: 4.2 MMOL/L — SIGNIFICANT CHANGE UP (ref 3.5–5.3)
PROCALCITONIN SERPL-MCNC: 0.07 NG/ML — SIGNIFICANT CHANGE UP (ref 0.02–0.1)
PROT SERPL-MCNC: 6.3 G/DL — SIGNIFICANT CHANGE UP (ref 6–8.3)
PROT SERPL-MCNC: 6.5 G/DL — SIGNIFICANT CHANGE UP (ref 6–8.3)
RBC # BLD: 3.31 M/UL — LOW (ref 3.8–5.2)
RBC # BLD: 3.4 M/UL — LOW (ref 3.8–5.2)
RBC # FLD: 15.9 % — HIGH (ref 10.3–14.5)
RBC # FLD: 16 % — HIGH (ref 10.3–14.5)
S PNEUM AG UR QL: NEGATIVE — SIGNIFICANT CHANGE UP
SAO2 % BLDA: 92 % — LOW (ref 94–98)
SAO2 % BLDMV: 39.1 % — SIGNIFICANT CHANGE UP
SARS-COV-2 RNA SPEC QL NAA+PROBE: SIGNIFICANT CHANGE UP
SODIUM SERPL-SCNC: 128 MMOL/L — LOW (ref 135–145)
SODIUM SERPL-SCNC: 131 MMOL/L — LOW (ref 135–145)
THC UR QL: NEGATIVE — SIGNIFICANT CHANGE UP
TIBC SERPL-MCNC: 325 UG/DL — SIGNIFICANT CHANGE UP (ref 220–430)
TRANSFERRIN SERPL-MCNC: 245 MG/DL — SIGNIFICANT CHANGE UP (ref 200–360)
TROPONIN T SERPL-MCNC: 5.15 NG/ML — CRITICAL HIGH (ref 0–0.01)
TROPONIN T SERPL-MCNC: 5.35 NG/ML — CRITICAL HIGH (ref 0–0.01)
TSH SERPL-MCNC: 1.55 UIU/ML — SIGNIFICANT CHANGE UP (ref 0.27–4.2)
UIBC SERPL-MCNC: 279 UG/DL — SIGNIFICANT CHANGE UP (ref 110–370)
WBC # BLD: 13.08 K/UL — HIGH (ref 3.8–10.5)
WBC # BLD: 9.85 K/UL — SIGNIFICANT CHANGE UP (ref 3.8–10.5)
WBC # FLD AUTO: 13.08 K/UL — HIGH (ref 3.8–10.5)
WBC # FLD AUTO: 9.85 K/UL — SIGNIFICANT CHANGE UP (ref 3.8–10.5)

## 2022-01-14 PROCEDURE — 71045 X-RAY EXAM CHEST 1 VIEW: CPT | Mod: 26

## 2022-01-14 PROCEDURE — 93308 TTE F-UP OR LMTD: CPT | Mod: 26

## 2022-01-14 PROCEDURE — 33967 INSERT I-AORT PERCUT DEVICE: CPT

## 2022-01-14 PROCEDURE — 93306 TTE W/DOPPLER COMPLETE: CPT | Mod: 26

## 2022-01-14 PROCEDURE — 99152 MOD SED SAME PHYS/QHP 5/>YRS: CPT

## 2022-01-14 PROCEDURE — 99223 1ST HOSP IP/OBS HIGH 75: CPT | Mod: GC

## 2022-01-14 PROCEDURE — 93463 DRUG ADMIN & HEMODYNMIC MEAS: CPT

## 2022-01-14 PROCEDURE — 71045 X-RAY EXAM CHEST 1 VIEW: CPT | Mod: 26,77

## 2022-01-14 PROCEDURE — 93460 R&L HRT ART/VENTRICLE ANGIO: CPT | Mod: 26

## 2022-01-14 PROCEDURE — 99291 CRITICAL CARE FIRST HOUR: CPT | Mod: 25

## 2022-01-14 PROCEDURE — 99292 CRITICAL CARE ADDL 30 MIN: CPT | Mod: 25

## 2022-01-14 RX ORDER — COLCHICINE 0.6 MG
0.6 TABLET ORAL EVERY 12 HOURS
Refills: 0 | Status: DISCONTINUED | OUTPATIENT
Start: 2022-01-14 | End: 2022-01-25

## 2022-01-14 RX ORDER — ACETAMINOPHEN 500 MG
650 TABLET ORAL EVERY 6 HOURS
Refills: 0 | Status: DISCONTINUED | OUTPATIENT
Start: 2022-01-14 | End: 2022-01-25

## 2022-01-14 RX ORDER — FUROSEMIDE 40 MG
20 TABLET ORAL ONCE
Refills: 0 | Status: COMPLETED | OUTPATIENT
Start: 2022-01-14 | End: 2022-01-14

## 2022-01-14 RX ORDER — HEPARIN SODIUM 5000 [USP'U]/ML
8000 INJECTION INTRAVENOUS; SUBCUTANEOUS ONCE
Refills: 0 | Status: COMPLETED | OUTPATIENT
Start: 2022-01-14 | End: 2022-01-14

## 2022-01-14 RX ORDER — SODIUM NITROPRUSSIDE 50 MG/2ML
1.5 INJECTION INTRAVENOUS
Qty: 100 | Refills: 0 | Status: DISCONTINUED | OUTPATIENT
Start: 2022-01-14 | End: 2022-01-20

## 2022-01-14 RX ORDER — FUROSEMIDE 40 MG
2.5 TABLET ORAL
Qty: 500 | Refills: 0 | Status: DISCONTINUED | OUTPATIENT
Start: 2022-01-14 | End: 2022-01-20

## 2022-01-14 RX ORDER — HEPARIN SODIUM 5000 [USP'U]/ML
INJECTION INTRAVENOUS; SUBCUTANEOUS
Qty: 25000 | Refills: 0 | Status: DISCONTINUED | OUTPATIENT
Start: 2022-01-14 | End: 2022-01-15

## 2022-01-14 RX ORDER — ONDANSETRON 8 MG/1
4 TABLET, FILM COATED ORAL ONCE
Refills: 0 | Status: COMPLETED | OUTPATIENT
Start: 2022-01-14 | End: 2022-01-14

## 2022-01-14 RX ADMIN — Medication 0.6 MILLIGRAM(S): at 10:39

## 2022-01-14 RX ADMIN — PANTOPRAZOLE SODIUM 40 MILLIGRAM(S): 20 TABLET, DELAYED RELEASE ORAL at 07:03

## 2022-01-14 RX ADMIN — HEPARIN SODIUM 1800 UNIT(S)/HR: 5000 INJECTION INTRAVENOUS; SUBCUTANEOUS at 21:46

## 2022-01-14 RX ADMIN — Medication 0.6 MILLIGRAM(S): at 21:32

## 2022-01-14 RX ADMIN — SIMETHICONE 80 MILLIGRAM(S): 80 TABLET, CHEWABLE ORAL at 00:00

## 2022-01-14 RX ADMIN — ONDANSETRON 4 MILLIGRAM(S): 8 TABLET, FILM COATED ORAL at 02:15

## 2022-01-14 RX ADMIN — HEPARIN SODIUM 8000 UNIT(S): 5000 INJECTION INTRAVENOUS; SUBCUTANEOUS at 21:45

## 2022-01-14 RX ADMIN — SODIUM NITROPRUSSIDE 22.2 MICROGRAM(S)/KG/MIN: 50 INJECTION INTRAVENOUS at 15:49

## 2022-01-14 RX ADMIN — Medication 20 MILLIGRAM(S): at 01:09

## 2022-01-14 RX ADMIN — Medication 650 MILLIGRAM(S): at 21:31

## 2022-01-14 RX ADMIN — Medication 650 MILLIGRAM(S): at 22:30

## 2022-01-14 NOTE — PROGRESS NOTE ADULT - SUBJECTIVE AND OBJECTIVE BOX
OVERNIGHT EVENTS: Transferred from [place]. 4mg IVP zofran for nausea. started on nitroprusside gtt at 0.5 and increased to 1. simethicone 80mg for gas and felt better after large BM. sBPs in 80's, decreased nipride to 0.5. A-line placed. 20 IV lasix for fluid overload.  additional zofran 4mg IVP for nausea. Lactate 2.2 (trending down from 2.4). urinated 300cc + partial leak from prima fit.      SUBJECTIVE / INTERVAL HPI: Patient seen and examined at bedside. Reports that she is feeling a lot better than yesterday. She endorses intermittent dry cough which brings on chest discomfort. She also is endorsing chest pressure worse with inspiration. Reports she feels her HR beating rapidly. Says she was nauseous last night but feels better now. Denies SOB, leg swelling, orthopnea, PND. Reports her epigastric discomfort has resolved.  Remaining ROS negative       PHYSICAL EXAM:    General: resting comfortably in bed in NAD  HEENT: NC/AT; PERRL, anicteric sclera; MMM  Neck: supple, no JVD  Cardiovascular: +S1/S2, tachycardic rate, regular rhythm, no murmurs   Respiratory: CTA B/L; no W/R/R, decreased breath sounds on right side   Gastrointestinal: soft, NT/ND; +BSx4  Extremities: WWP; no edema, clubbing or cyanosis. right radial A line in place.   Vascular: 2+ radial, DP pulses B/L  Neurological: AAOx3; no focal deficits  Dermatologic: no appreciable wounds or damage to the skin    VITAL SIGNS:  Vital Signs Last 24 Hrs  T(C): 36.4 (14 Jan 2022 12:25), Max: 36.9 (13 Jan 2022 19:23)  T(F): 97.6 (14 Jan 2022 12:25), Max: 98.5 (14 Jan 2022 09:00)  HR: 132 (14 Jan 2022 16:00) (115 - 140)  BP: 92/65 (14 Jan 2022 00:45) (90/59 - 101/74)  BP(mean): 74 (14 Jan 2022 00:45) (70 - 84)  RR: 26 (14 Jan 2022 16:00) (17 - 34)  SpO2: 97% (14 Jan 2022 16:00) (94% - 100%)      MEDICATIONS:  MEDICATIONS  (STANDING):  chlorhexidine 2% Cloths 1 Application(s) Topical <User Schedule>  colchicine 0.6 milliGRAM(s) Oral every 12 hours  dextrose 40% Gel 15 Gram(s) Oral once  dextrose 5%. 1000 milliLiter(s) (50 mL/Hr) IV Continuous <Continuous>  dextrose 5%. 1000 milliLiter(s) (100 mL/Hr) IV Continuous <Continuous>  dextrose 50% Injectable 25 Gram(s) IV Push once  dextrose 50% Injectable 12.5 Gram(s) IV Push once  dextrose 50% Injectable 25 Gram(s) IV Push once  glucagon  Injectable 1 milliGRAM(s) IntraMuscular once  influenza   Vaccine 0.5 milliLiter(s) IntraMuscular once  nitroprusside Infusion 1.5 MICROgram(s)/kG/Min (22.2 mL/Hr) IV Continuous <Continuous>  pantoprazole    Tablet 40 milliGRAM(s) Oral before breakfast    MEDICATIONS  (PRN):      ALLERGIES:  Allergies    No Known Allergies    Intolerances        LABS:                        9.5    9.85  )-----------( 436      ( 14 Jan 2022 03:22 )             29.0     01-14    128<L>  |  95<L>  |  14  ----------------------------<  125<H>  4.2   |  21<L>  |  0.71    Ca    8.4      14 Jan 2022 12:10  Phos  2.8     01-14  Mg     2.4     01-14    TPro  6.3  /  Alb  3.3  /  TBili  0.6  /  DBili  x   /  AST  91<H>  /  ALT  286<H>  /  AlkPhos  200<H>  01-14    PT/INR - ( 13 Jan 2022 19:29 )   PT: 17.1 sec;   INR: 1.45          PTT - ( 13 Jan 2022 19:29 )  PTT:27.8 sec    CAPILLARY BLOOD GLUCOSE      POCT Blood Glucose.: 167 mg/dL (13 Jan 2022 23:58)      RADIOLOGY & ADDITIONAL TESTS: Reviewed.

## 2022-01-14 NOTE — CONSULT NOTE ADULT - PROBLEM SELECTOR RECOMMENDATION 9
Limited TTE with biventricular dysfunction (LV severely reduced). Possibly sequale to COVID (though unclear timeline, she does have positive antibodies but has been negative the past few weeks).   - recommend leave-in [place] as well as ischemic evaluation given reduced LVEF   - reasonable to c/w nipride though would adjust based off hemodynamics   - official TTE  - hold further diuresis

## 2022-01-14 NOTE — PROGRESS NOTE ADULT - ASSESSMENT
Ms Harrell is a 35 yo F w/ no PMHx admitted to [place] for perimyocarditis and then transferred to [facility] for further work up and management of cardiogenic shock.    Neuro  AAOx3, No active issues    Cardiac  #Cardiogenic shock  Echo at [place] (1/10/22) revealed EF 30-35% with pericardial effusion  Lactate 2.4->2.2->2.1. Pro-BNP 56415. Elevated LFTs. Patient remaining hypotensive with likely compensatory tachycardia  Likely 2/2 perimyocarditis  -TTE 1/13 with severely reduced LV systolic function with EF 15% and RWMA; normal RV function, no valve disease   -Nipride gtt for afterload reduction (Goal MAP >65) --no inotropes as pt tachycardic   -HF consulted , recs appreciated   -right IJ with indwelling Slater cath for therapeutic monitoring  -left and right heart cath today to assess pressures and ischemic eval  -holding beta blocker given hypotension with reflex tachy  -diuresis prn ; caution as pt hypotensive   -strict I/Os  -trend cardiac enzymes daily     #Perimyocarditis   Patient presenting with pleuritic chest pain with associated myalgias, chills, cough, fever, and generalized fatigue  , CKMB 84.8, Trop T 5.83 on admission; continue to trend daily   .6, ESR 72  EKG sinus tachycardia with diffuse ST elevations, and ST depressions in leads aVR and V1  Etiology likely viral versus autoimmune  -f/u RVP, Coxsackie, HIV, CMV, EBV serologies   -f/u MILAN, scleroderma, rheumatoid factor, IgG subsets, anti-mitochondrial ab, anti-dsDNA ab  -Urine drug screen negative  -TSH wnl  -started on colchicine 0.6mg BID  -plan for cardiac MRI once patient medically stable     Pulm  #r/o PE  Pleuritic chest pain, tachycardia, immobilization during previous hospitalization. Wells 3 (HR, immobilization). Subjectively R>L lower extremity swelling. D-dimer 680. Right sided pleural effusion.  -f/u doppler LEs  -continue to monitor respiratory status     #Pleural effusion  CXR reveals small right-sided pleural effusion. Likely 2/2 congestion i/s/o cardiogenic shock, r/o PE  -See plan above (cardiogenic shock, r/o PE)  -Continue to monitor     Renal  #Hyponatremia  Na 129->131->128  -patient asymptomatic ; likely hypervolemic hyponatremia 2/2 to cardiogenic shock  -Continue to monitor BMP  -diuresis prn   -If pt becomes symptomatic, consider ordering serum/urine osmolality and urine sodium    GI  #Transaminitis   Patient with ALP/ALT/AST remaining elevated; likely 2/2 congestive hepatopathy versus ischemia i/s/o cardiogenic shock  -abd US from OSH showing enlarged heterogenous liver   -continue to monitor  -hepatitis panel negative  -f/u abdominal ultrasound  -f/u acetaminophen level      Heme-Onc  #Anemia , normocytic   Hbg remaining 9.9->9.5. No hx of anemia. Currently menstruating, patient endorses menometrorrhagia. No other signs of bleeding.   - iron studies wnl  - trend CBC  - maintain active T&S  - transfuse for Hgb <7     Endocrine  No active issues    ID  No active issues    Other  F: tolerating PO, no IVF  E: replete K<4, Mg<2  N: NPO for catheterization today    VTE Prophylaxis: SCDs (right and left heart cath)  GI: Pantoprazole 40mg PO daily  C: Full Code  D: CCU

## 2022-01-14 NOTE — CONSULT NOTE ADULT - ASSESSMENT
35 yo F w/ hx of anemia presents from [place] with concern for myopericarditis and concern for cardiogenic shock.

## 2022-01-14 NOTE — PROGRESS NOTE ADULT - ATTENDING COMMENTS
36F, obese, no significant PMH w/ recent [facility] ER visit ~2weeks for PNA(Unclear covid results, pt. is unvaccinated), sent home on oral abx. Subsequently presents back to [facility] ER on 1/9 w/ nausea, abdominal discomfort, SOB -> found to have acute systolic CHF likely 2/2 Myocarditis c/b cardiogenic shock, tx'd to [facility] CCU for further mgmt    -sCHF - acute systolic CHF 2/2 Myocarditis, c/b cardiogenic shock s/p L/RHC - no obstructive CAD; RHC w/ severely elevated R/L filling pressures and low cardiac indices c/w cardiogenic shock (CO/CI - 2.7/1.4 on RHC, off Nipride) s/p IABP placement, Nipride gtt restarted, currently at 1.5mcg/kg/min, start Lasix gtt @ 5mg/hr; Repeat hemodynamics w/ lactates; Not a candidate for inotrope given sinus tachycardia(130s). Lactate remains low 2s, normal renal function, good UOP and LFTs improving; currently in no respiratory distress but w/ desaturation to high 80s on NC4L -> transition to HFNC. Advanced HF following  -DASH diet  -DVT PPx  -Dispo: CCU  -Full Code    Sebastián Humphries MD  CCU Attending

## 2022-01-14 NOTE — CONSULT NOTE ADULT - PROBLEM SELECTOR RECOMMENDATION 2
TTE with biventricular dysfunction. Concern for cardiogenic shock, pending hemodynamics   - official TTE as above  - do not give any BB as tachycardia is compensatory

## 2022-01-14 NOTE — CONSULT NOTE ADULT - ATTENDING COMMENTS
Patient is an obese (BMI 41), 37 yo F with menorrhagia and anemia who presented to OSH with chest pain and ANGELO. She was seen 2 weeks prior for back pain and was diagnosed with pneumonia treated with antibiotics. She was told at that time that she had COVID nucleocapsid antibodies. She is not vaccinated for COVID. In the 3 days prior to this admission, she noted chest pressure and palpitation as well as new orthopnea that improved with sleeping propped up on 2 pillows. She has 2 children and has not had a problem managing household tasks/chores, but she is winded going up/down stairs for a few months that she attributed to being overweight. FH notable for HTN in her mother, but no SCD or HF. No alcohol in excess, no drugs and nonsmoker.    ECG with diffuse, nonspecific ST elevations and [place] depressions, tachycardic to 130s.  Positive cardiac enzymes and D-dimer, mild transaminitis, and lactate 2.2 today (from 2.4).  She was initiated on nipride 0.5 mcg/kg/min upon arrival with BP 90/60, stable today. No hemodynamics yet obtained.  No ventricular arrhythmias observed and she has never had syncope.    I agree with plan for RHC today and would place in right IJ with CCU [place] so it can remain indwelling for therapeutic monitoring.  Would check LHC and LVEDP, as well, to confirm this is truly myopericarditis as she made cardiac enzymes and has diffuse ST elevations (not in a coronary distribution). She has normal SCr.    OK to continue nipride for now (1 mcg/kg/min) as she has tolerated it, but it is indicated for patients that are cold and wet. Her JVP is not visible, but unclear if that means it is low or very high. She has no signs of right heart failure with no LE edema and a normal appetite. She did have nausea earlier that may have been related to medication administration.    No indication for EMB as she is not having arrhythmias and it will not change our current management.  Would proceed with cardiac MRI once she is stabilized and off IV medications.  Further recs pending outcome of RHC/LHC study later today.

## 2022-01-14 NOTE — CONSULT NOTE ADULT - SUBJECTIVE AND OBJECTIVE BOX
HPI:  35 yo F w/ hx of anemia presents from [place] with concern for myopericarditis and concern for cardiogenic shock. 2 weeks ago, patient had back pain which promoted her to go to the ED, at the time she was diagnosed with PNA. 2 days ago, she started having myalgias, chills, cough, fever, fatigue and chest discomfort, she was noted to be febrile with ECG showing diffuse ST elevations, TTE showing EF 30-35% and admitted for myopericarditis. She was transferred to [facility] for further management. Since arrival, she was started on nipride. Endorses orthopnea, PND the past few days.     PAST MEDICAL & SURGICAL HISTORY:  Anemia    No significant past surgical history      PREVIOUS DIAGNOSTIC TESTING:      FAMILY HISTORY:  No pertinent family history in first degree relatives    Social History:  Tobacco: None  EtOH: Socially  Recreational drug Use: None  Living situation: Lives with father and 2 children (13 Jan 2022 20:32)      ALLERGIES/INTOLERANCES:  No Known Allergies    HOME MEDICATIONS:    INPATIENT MEDICATIONS:  nitroprusside Infusion 1.5 MICROgram(s)/kG/Min (22.2 mL/Hr) IV Continuous <Continuous>      chlorhexidine 2% Cloths 1 Application(s) Topical <User Schedule>  colchicine 0.6 milliGRAM(s) Oral every 12 hours  dextrose 40% Gel 15 Gram(s) Oral once  dextrose 5%. 1000 milliLiter(s) IV Continuous <Continuous>  dextrose 5%. 1000 milliLiter(s) IV Continuous <Continuous>  dextrose 50% Injectable 25 Gram(s) IV Push once  dextrose 50% Injectable 12.5 Gram(s) IV Push once  dextrose 50% Injectable 25 Gram(s) IV Push once  glucagon  Injectable 1 milliGRAM(s) IntraMuscular once  influenza   Vaccine 0.5 milliLiter(s) IntraMuscular once  pantoprazole    Tablet 40 milliGRAM(s) Oral before breakfast      REVIEW OF SYSTEMS: See HPI     PHYSICAL EXAM:  Gen: NAD   HEENT: EOMI   Neck: difficult to assess  Cor: Normal s1, s2. RRR.   Abd: soft, non-tender, non-distended  Ext: no edema  Neuro: alert and attentive    T(C): 36.9 (01-14-22 @ 09:00), Max: 36.9 (01-13-22 @ 19:23)  HR: 130 (01-14-22 @ 10:00) (115 - 140)  BP: 92/65 (01-14-22 @ 00:45) (90/59 - 101/74)  RR: 19 (01-14-22 @ 10:00) (17 - 34)  SpO2: 100% (01-14-22 @ 10:00) (94% - 100%)  Wt(kg): --    I&O's Summary    13 Jan 2022 07:01  -  14 Jan 2022 07:00  --------------------------------------------------------  IN: 107.3 mL / OUT: 300 mL / NET: -192.7 mL    14 Jan 2022 07:01  -  14 Jan 2022 11:01  --------------------------------------------------------  IN: 59 mL / OUT: 0 mL / NET: 59 mL      ECG:  	sinus tachycardia with TN depression and diffuse ST elevation   	  LABS:                        9.5    9.85  )-----------( 436      ( 14 Jan 2022 03:22 )             29.0     01-14    131<L>  |  96  |  13  ----------------------------<  146<H>  4.2   |  20<L>  |  0.72    Ca    8.3<L>      14 Jan 2022 03:22  Phos  2.8     01-14  Mg     2.4     01-14    TPro  6.5  /  Alb  3.6  /  TBili  0.7  /  DBili  x   /  AST  113<H>  /  ALT  321<H>  /  AlkPhos  237<H>  01-14      TSH: Thyroid Stimulating Hormone, Serum: 1.550 uIU/mL (01-13 @ 19:29)      CARDIAC MARKERS:          proBNP: Serum Pro-Brain Natriuretic Peptide: 08378 pg/mL (01-13 @ 19:29)      RADIOLOGY:

## 2022-01-15 LAB
ALBUMIN SERPL ELPH-MCNC: 3 G/DL — LOW (ref 3.3–5)
ALBUMIN SERPL ELPH-MCNC: 3 G/DL — LOW (ref 3.3–5)
ALBUMIN SERPL ELPH-MCNC: 3.2 G/DL — LOW (ref 3.3–5)
ALP SERPL-CCNC: 174 U/L — HIGH (ref 40–120)
ALP SERPL-CCNC: 181 U/L — HIGH (ref 40–120)
ALP SERPL-CCNC: 188 U/L — HIGH (ref 40–120)
ALT FLD-CCNC: 164 U/L — HIGH (ref 10–45)
ALT FLD-CCNC: 184 U/L — HIGH (ref 10–45)
ALT FLD-CCNC: 204 U/L — HIGH (ref 10–45)
ANION GAP SERPL CALC-SCNC: 11 MMOL/L — SIGNIFICANT CHANGE UP (ref 5–17)
ANION GAP SERPL CALC-SCNC: 13 MMOL/L — SIGNIFICANT CHANGE UP (ref 5–17)
ANION GAP SERPL CALC-SCNC: 14 MMOL/L — SIGNIFICANT CHANGE UP (ref 5–17)
APTT BLD: 157.8 SEC — CRITICAL HIGH (ref 27.5–35.5)
APTT BLD: 43.9 SEC — HIGH (ref 27.5–35.5)
APTT BLD: 71.1 SEC — HIGH (ref 27.5–35.5)
APTT BLD: 71.5 SEC — HIGH (ref 27.5–35.5)
AST SERPL-CCNC: 49 U/L — HIGH (ref 10–40)
AST SERPL-CCNC: 53 U/L — HIGH (ref 10–40)
AST SERPL-CCNC: 60 U/L — HIGH (ref 10–40)
BASE EXCESS BLDA CALC-SCNC: 3.1 MMOL/L — HIGH (ref -2–3)
BASE EXCESS BLDA CALC-SCNC: 4.3 MMOL/L — HIGH (ref -2–3)
BASE EXCESS BLDMV CALC-SCNC: 2.2 MMOL/L — SIGNIFICANT CHANGE UP
BASE EXCESS BLDMV CALC-SCNC: 4.1 MMOL/L — SIGNIFICANT CHANGE UP
BASE EXCESS BLDMV CALC-SCNC: 7.2 MMOL/L — SIGNIFICANT CHANGE UP
BILIRUB SERPL-MCNC: 0.5 MG/DL — SIGNIFICANT CHANGE UP (ref 0.2–1.2)
BILIRUB SERPL-MCNC: 0.6 MG/DL — SIGNIFICANT CHANGE UP (ref 0.2–1.2)
BILIRUB SERPL-MCNC: 0.6 MG/DL — SIGNIFICANT CHANGE UP (ref 0.2–1.2)
BUN SERPL-MCNC: 10 MG/DL — SIGNIFICANT CHANGE UP (ref 7–23)
BUN SERPL-MCNC: 12 MG/DL — SIGNIFICANT CHANGE UP (ref 7–23)
BUN SERPL-MCNC: 15 MG/DL — SIGNIFICANT CHANGE UP (ref 7–23)
CALCIUM SERPL-MCNC: 7.8 MG/DL — LOW (ref 8.4–10.5)
CHLORIDE SERPL-SCNC: 92 MMOL/L — LOW (ref 96–108)
CHLORIDE SERPL-SCNC: 93 MMOL/L — LOW (ref 96–108)
CHLORIDE SERPL-SCNC: 95 MMOL/L — LOW (ref 96–108)
CK MB CFR SERPL CALC: 40.1 NG/ML — HIGH (ref 0–6.7)
CK SERPL-CCNC: 228 U/L — HIGH (ref 25–170)
CMV IGG FLD QL: >10 U/ML — HIGH
CMV IGG SERPL-IMP: POSITIVE
CMV IGM FLD-ACNC: <8 AU/ML — SIGNIFICANT CHANGE UP
CMV IGM SERPL QL: NEGATIVE — SIGNIFICANT CHANGE UP
CO2 BLDA-SCNC: 26 MMOL/L — HIGH (ref 19–24)
CO2 BLDA-SCNC: 28 MMOL/L — HIGH (ref 19–24)
CO2 BLDMV-SCNC: 29.8 MMOL/L — SIGNIFICANT CHANGE UP
CO2 SERPL-SCNC: 23 MMOL/L — SIGNIFICANT CHANGE UP (ref 22–31)
CO2 SERPL-SCNC: 24 MMOL/L — SIGNIFICANT CHANGE UP (ref 22–31)
CO2 SERPL-SCNC: 25 MMOL/L — SIGNIFICANT CHANGE UP (ref 22–31)
COHGB MFR BLDMV: 1.7 % — SIGNIFICANT CHANGE UP
COVID-19 NUCLEOCAPSID GAM AB INTERP: POSITIVE
COVID-19 NUCLEOCAPSID TOTAL GAM ANTIBODY RESULT: 50.1 INDEX — HIGH
COVID-19 SPIKE DOMAIN AB INTERP: POSITIVE
COVID-19 SPIKE DOMAIN ANTIBODY RESULT: >250 U/ML — HIGH
CREAT SERPL-MCNC: 0.73 MG/DL — SIGNIFICANT CHANGE UP (ref 0.5–1.3)
CREAT SERPL-MCNC: 0.81 MG/DL — SIGNIFICANT CHANGE UP (ref 0.5–1.3)
CREAT SERPL-MCNC: 0.83 MG/DL — SIGNIFICANT CHANGE UP (ref 0.5–1.3)
EBV EA AB SER IA-ACNC: <5 U/ML — SIGNIFICANT CHANGE UP
EBV EA AB TITR SER IF: POSITIVE
EBV EA IGG SER-ACNC: NEGATIVE — SIGNIFICANT CHANGE UP
EBV NA IGG SER IA-ACNC: 255 U/ML — HIGH
EBV PATRN SPEC IB-IMP: SIGNIFICANT CHANGE UP
EBV VCA IGG AVIDITY SER QL IA: POSITIVE
EBV VCA IGM SER IA-ACNC: 10.7 U/ML — SIGNIFICANT CHANGE UP
EBV VCA IGM SER IA-ACNC: >750 U/ML — HIGH
EBV VCA IGM TITR FLD: NEGATIVE — SIGNIFICANT CHANGE UP
GAS PNL BLDA: SIGNIFICANT CHANGE UP
GAS PNL BLDMV: SIGNIFICANT CHANGE UP
GAS PNL BLDMV: SIGNIFICANT CHANGE UP
GLUCOSE BLDC GLUCOMTR-MCNC: 120 MG/DL — HIGH (ref 70–99)
GLUCOSE SERPL-MCNC: 106 MG/DL — HIGH (ref 70–99)
GLUCOSE SERPL-MCNC: 113 MG/DL — HIGH (ref 70–99)
GLUCOSE SERPL-MCNC: 133 MG/DL — HIGH (ref 70–99)
HCO3 BLDA-SCNC: 25 MMOL/L — SIGNIFICANT CHANGE UP (ref 21–28)
HCO3 BLDA-SCNC: 27 MMOL/L — SIGNIFICANT CHANGE UP (ref 21–28)
HCO3 BLDMV-SCNC: 26.5 MMOL/L — SIGNIFICANT CHANGE UP
HCO3 BLDMV-SCNC: 29 MMOL/L — SIGNIFICANT CHANGE UP
HCO3 BLDMV-SCNC: 31 MMOL/L — SIGNIFICANT CHANGE UP
HCT VFR BLD CALC: 24.5 % — LOW (ref 34.5–45)
HCT VFR BLD CALC: 28.1 % — LOW (ref 34.5–45)
HCT VFR BLD CALC: 28.2 % — LOW (ref 34.5–45)
HCT VFR BLD CALC: 28.4 % — LOW (ref 34.5–45)
HGB BLD-MCNC: 8.3 G/DL — LOW (ref 11.5–15.5)
HGB BLD-MCNC: 8.9 G/DL — LOW (ref 11.5–15.5)
HGB BLD-MCNC: 9.2 G/DL — LOW (ref 11.5–15.5)
HGB BLD-MCNC: 9.5 G/DL — LOW (ref 11.5–15.5)
HGB FLD-MCNC: 9.4 G/DL — SIGNIFICANT CHANGE UP (ref 11.7–16.1)
LACTATE SERPL-SCNC: 1.4 MMOL/L — SIGNIFICANT CHANGE UP (ref 0.5–2)
LACTATE SERPL-SCNC: 1.4 MMOL/L — SIGNIFICANT CHANGE UP (ref 0.5–2)
LACTATE SERPL-SCNC: 1.5 MMOL/L — SIGNIFICANT CHANGE UP (ref 0.5–2)
MAGNESIUM SERPL-MCNC: 2.2 MG/DL — SIGNIFICANT CHANGE UP (ref 1.6–2.6)
MCHC RBC-ENTMCNC: 28.1 PG — SIGNIFICANT CHANGE UP (ref 27–34)
MCHC RBC-ENTMCNC: 28.8 PG — SIGNIFICANT CHANGE UP (ref 27–34)
MCHC RBC-ENTMCNC: 29.3 PG — SIGNIFICANT CHANGE UP (ref 27–34)
MCHC RBC-ENTMCNC: 31.7 GM/DL — LOW (ref 32–36)
MCHC RBC-ENTMCNC: 32.4 PG — SIGNIFICANT CHANGE UP (ref 27–34)
MCHC RBC-ENTMCNC: 32.6 GM/DL — SIGNIFICANT CHANGE UP (ref 32–36)
MCHC RBC-ENTMCNC: 33.5 GM/DL — SIGNIFICANT CHANGE UP (ref 32–36)
MCHC RBC-ENTMCNC: 33.9 GM/DL — SIGNIFICANT CHANGE UP (ref 32–36)
MCV RBC AUTO: 87.7 FL — SIGNIFICANT CHANGE UP (ref 80–100)
MCV RBC AUTO: 88.4 FL — SIGNIFICANT CHANGE UP (ref 80–100)
MCV RBC AUTO: 88.6 FL — SIGNIFICANT CHANGE UP (ref 80–100)
MCV RBC AUTO: 95.7 FL — SIGNIFICANT CHANGE UP (ref 80–100)
METHGB MFR BLDMV: 0 % — SIGNIFICANT CHANGE UP
NRBC # BLD: 0 /100 WBCS — SIGNIFICANT CHANGE UP (ref 0–0)
NRBC # BLD: 1 /100 WBCS — HIGH (ref 0–0)
O2 CT VFR BLD CALC: 30 MMHG — SIGNIFICANT CHANGE UP
O2 CT VFR BLD CALC: 31 MMHG — SIGNIFICANT CHANGE UP
O2 CT VFR BLD CALC: 33 MMHG — SIGNIFICANT CHANGE UP
OSMOLALITY SERPL: 271 MOSM/KG — LOW (ref 275–300)
OSMOLALITY UR: 564 MOSM/KG — SIGNIFICANT CHANGE UP (ref 300–900)
OXYHGB MFR BLDMV: 57.1 % — SIGNIFICANT CHANGE UP
PCO2 BLDA: 30 MMHG — LOW (ref 32–35)
PCO2 BLDA: 33 MMHG — SIGNIFICANT CHANGE UP (ref 32–35)
PCO2 BLDMV: 39 MMHG — SIGNIFICANT CHANGE UP
PCO2 BLDMV: 41 MMHG — SIGNIFICANT CHANGE UP
PCO2 BLDMV: 41 MMHG — SIGNIFICANT CHANGE UP
PH BLDA: 7.52 — HIGH (ref 7.35–7.45)
PH BLDA: 7.53 — HIGH (ref 7.35–7.45)
PH BLDMV: 7.44 — SIGNIFICANT CHANGE UP
PH BLDMV: 7.45 — SIGNIFICANT CHANGE UP
PH BLDMV: 7.49 — SIGNIFICANT CHANGE UP
PHOSPHATE SERPL-MCNC: 3.3 MG/DL — SIGNIFICANT CHANGE UP (ref 2.5–4.5)
PLATELET # BLD AUTO: 102 K/UL — LOW (ref 150–400)
PLATELET # BLD AUTO: 368 K/UL — SIGNIFICANT CHANGE UP (ref 150–400)
PLATELET # BLD AUTO: 380 K/UL — SIGNIFICANT CHANGE UP (ref 150–400)
PLATELET # BLD AUTO: 394 K/UL — SIGNIFICANT CHANGE UP (ref 150–400)
PO2 BLDA: 142 MMHG — HIGH (ref 83–108)
PO2 BLDA: 91 MMHG — SIGNIFICANT CHANGE UP (ref 83–108)
POTASSIUM SERPL-MCNC: 3.3 MMOL/L — LOW (ref 3.5–5.3)
POTASSIUM SERPL-MCNC: 3.6 MMOL/L — SIGNIFICANT CHANGE UP (ref 3.5–5.3)
POTASSIUM SERPL-MCNC: 3.7 MMOL/L — SIGNIFICANT CHANGE UP (ref 3.5–5.3)
POTASSIUM SERPL-SCNC: 3.3 MMOL/L — LOW (ref 3.5–5.3)
POTASSIUM SERPL-SCNC: 3.6 MMOL/L — SIGNIFICANT CHANGE UP (ref 3.5–5.3)
POTASSIUM SERPL-SCNC: 3.7 MMOL/L — SIGNIFICANT CHANGE UP (ref 3.5–5.3)
PROT SERPL-MCNC: 5.8 G/DL — LOW (ref 6–8.3)
PROT SERPL-MCNC: 6 G/DL — SIGNIFICANT CHANGE UP (ref 6–8.3)
PROT SERPL-MCNC: 6.2 G/DL — SIGNIFICANT CHANGE UP (ref 6–8.3)
RBC # BLD: 2.56 M/UL — LOW (ref 3.8–5.2)
RBC # BLD: 3.17 M/UL — LOW (ref 3.8–5.2)
RBC # BLD: 3.19 M/UL — LOW (ref 3.8–5.2)
RBC # BLD: 3.24 M/UL — LOW (ref 3.8–5.2)
RBC # FLD: 14.4 % — SIGNIFICANT CHANGE UP (ref 10.3–14.5)
RBC # FLD: 15.9 % — HIGH (ref 10.3–14.5)
SAO2 % BLDA: 98 % — SIGNIFICANT CHANGE UP (ref 94–98)
SAO2 % BLDA: 98.4 % — HIGH (ref 94–98)
SAO2 % BLDMV: 52.3 % — SIGNIFICANT CHANGE UP
SAO2 % BLDMV: 53.6 % — SIGNIFICANT CHANGE UP
SAO2 % BLDMV: 58 % — SIGNIFICANT CHANGE UP
SARS-COV-2 IGG+IGM SERPL QL IA: 50.1 INDEX — HIGH
SARS-COV-2 IGG+IGM SERPL QL IA: >250 U/ML — HIGH
SARS-COV-2 IGG+IGM SERPL QL IA: POSITIVE
SARS-COV-2 IGG+IGM SERPL QL IA: POSITIVE
SODIUM SERPL-SCNC: 127 MMOL/L — LOW (ref 135–145)
SODIUM SERPL-SCNC: 131 MMOL/L — LOW (ref 135–145)
SODIUM SERPL-SCNC: 132 MMOL/L — LOW (ref 135–145)
SODIUM UR-SCNC: 35 MMOL/L — SIGNIFICANT CHANGE UP
TROPONIN T SERPL-MCNC: 4.88 NG/ML — CRITICAL HIGH (ref 0–0.01)
WBC # BLD: 10.16 K/UL — SIGNIFICANT CHANGE UP (ref 3.8–10.5)
WBC # BLD: 11.72 K/UL — HIGH (ref 3.8–10.5)
WBC # BLD: 12.07 K/UL — HIGH (ref 3.8–10.5)
WBC # BLD: 12.47 K/UL — HIGH (ref 3.8–10.5)
WBC # FLD AUTO: 10.16 K/UL — SIGNIFICANT CHANGE UP (ref 3.8–10.5)
WBC # FLD AUTO: 11.72 K/UL — HIGH (ref 3.8–10.5)
WBC # FLD AUTO: 12.07 K/UL — HIGH (ref 3.8–10.5)
WBC # FLD AUTO: 12.47 K/UL — HIGH (ref 3.8–10.5)

## 2022-01-15 PROCEDURE — 99291 CRITICAL CARE FIRST HOUR: CPT

## 2022-01-15 PROCEDURE — 99292 CRITICAL CARE ADDL 30 MIN: CPT

## 2022-01-15 PROCEDURE — 71045 X-RAY EXAM CHEST 1 VIEW: CPT | Mod: 26

## 2022-01-15 RX ORDER — POTASSIUM CHLORIDE 20 MEQ
20 PACKET (EA) ORAL EVERY 4 HOURS
Refills: 0 | Status: COMPLETED | OUTPATIENT
Start: 2022-01-15 | End: 2022-01-15

## 2022-01-15 RX ORDER — SIMETHICONE 80 MG/1
80 TABLET, CHEWABLE ORAL ONCE
Refills: 0 | Status: COMPLETED | OUTPATIENT
Start: 2022-01-15 | End: 2022-01-15

## 2022-01-15 RX ORDER — POTASSIUM CHLORIDE 20 MEQ
20 PACKET (EA) ORAL
Refills: 0 | Status: DISCONTINUED | OUTPATIENT
Start: 2022-01-15 | End: 2022-01-15

## 2022-01-15 RX ORDER — POTASSIUM CHLORIDE 20 MEQ
40 PACKET (EA) ORAL EVERY 4 HOURS
Refills: 0 | Status: COMPLETED | OUTPATIENT
Start: 2022-01-15 | End: 2022-01-16

## 2022-01-15 RX ORDER — LANOLIN ALCOHOL/MO/W.PET/CERES
5 CREAM (GRAM) TOPICAL AT BEDTIME
Refills: 0 | Status: DISCONTINUED | OUTPATIENT
Start: 2022-01-15 | End: 2022-01-15

## 2022-01-15 RX ORDER — HEPARIN SODIUM 5000 [USP'U]/ML
1500 INJECTION INTRAVENOUS; SUBCUTANEOUS
Qty: 25000 | Refills: 0 | Status: DISCONTINUED | OUTPATIENT
Start: 2022-01-15 | End: 2022-01-15

## 2022-01-15 RX ORDER — HEPARIN SODIUM 5000 [USP'U]/ML
1700 INJECTION INTRAVENOUS; SUBCUTANEOUS
Qty: 25000 | Refills: 0 | Status: DISCONTINUED | OUTPATIENT
Start: 2022-01-15 | End: 2022-01-16

## 2022-01-15 RX ORDER — SPIRONOLACTONE 25 MG/1
25 TABLET, FILM COATED ORAL EVERY 24 HOURS
Refills: 0 | Status: DISCONTINUED | OUTPATIENT
Start: 2022-01-16 | End: 2022-01-22

## 2022-01-15 RX ORDER — POTASSIUM CHLORIDE 20 MEQ
40 PACKET (EA) ORAL ONCE
Refills: 0 | Status: DISCONTINUED | OUTPATIENT
Start: 2022-01-15 | End: 2022-01-15

## 2022-01-15 RX ORDER — FUROSEMIDE 40 MG
40 TABLET ORAL ONCE
Refills: 0 | Status: COMPLETED | OUTPATIENT
Start: 2022-01-15 | End: 2022-01-15

## 2022-01-15 RX ORDER — LANOLIN ALCOHOL/MO/W.PET/CERES
5 CREAM (GRAM) TOPICAL ONCE
Refills: 0 | Status: COMPLETED | OUTPATIENT
Start: 2022-01-15 | End: 2022-01-15

## 2022-01-15 RX ADMIN — SIMETHICONE 80 MILLIGRAM(S): 80 TABLET, CHEWABLE ORAL at 19:09

## 2022-01-15 RX ADMIN — Medication 3.75 MG/HR: at 10:01

## 2022-01-15 RX ADMIN — Medication 650 MILLIGRAM(S): at 16:26

## 2022-01-15 RX ADMIN — Medication 100 MILLIEQUIVALENT(S): at 10:11

## 2022-01-15 RX ADMIN — Medication 40 MILLIGRAM(S): at 10:09

## 2022-01-15 RX ADMIN — PANTOPRAZOLE SODIUM 40 MILLIGRAM(S): 20 TABLET, DELAYED RELEASE ORAL at 06:16

## 2022-01-15 RX ADMIN — Medication 650 MILLIGRAM(S): at 17:00

## 2022-01-15 RX ADMIN — CHLORHEXIDINE GLUCONATE 1 APPLICATION(S): 213 SOLUTION TOPICAL at 07:28

## 2022-01-15 RX ADMIN — HEPARIN SODIUM 17 UNIT(S)/HR: 5000 INJECTION INTRAVENOUS; SUBCUTANEOUS at 23:08

## 2022-01-15 RX ADMIN — Medication 40 MILLIEQUIVALENT(S): at 20:57

## 2022-01-15 RX ADMIN — Medication 100 MILLIEQUIVALENT(S): at 14:28

## 2022-01-15 RX ADMIN — Medication 0.6 MILLIGRAM(S): at 21:06

## 2022-01-15 RX ADMIN — Medication 650 MILLIGRAM(S): at 06:16

## 2022-01-15 RX ADMIN — Medication 3.75 MG/HR: at 20:57

## 2022-01-15 RX ADMIN — Medication 650 MILLIGRAM(S): at 07:29

## 2022-01-15 RX ADMIN — SIMETHICONE 80 MILLIGRAM(S): 80 TABLET, CHEWABLE ORAL at 00:34

## 2022-01-15 RX ADMIN — Medication 0.6 MILLIGRAM(S): at 10:58

## 2022-01-15 RX ADMIN — Medication 5 MILLIGRAM(S): at 00:42

## 2022-01-15 RX ADMIN — SODIUM NITROPRUSSIDE 22.2 MICROGRAM(S)/KG/MIN: 50 INJECTION INTRAVENOUS at 02:41

## 2022-01-15 NOTE — PROGRESS NOTE ADULT - ASSESSMENT
Ms Harrell is a 37 yo F w/ no PMHx admitted to [facility] for perimyocarditis and then transferred to [facility] for further work up and management of cardiogenic shock.    Neuro  AAOx3, No active issues    Cardiac  #Cardiogenic shock  Echo at [facility] (1/10/22) revealed EF 30-35% with pericardial effusion  Lactate 2.4->2.2->2.1. Pro-BNP 45877. Elevated LFTs. Patient remaining hypotensive with likely compensatory tachycardia  Likely 2/2 perimyocarditis  -TTE 1/13 with severely reduced LV systolic function with EF 15% and RWMA; normal RV function, no valve disease   -Nipride gtt for afterload reduction (Goal MAP >65) --no inotropes as pt tachycardic   -HF consulted , recs appreciated   -right IJ with indwelling [place] cath for therapeutic monitoring  -left and right heart cath today to assess pressures and ischemic eval  -holding beta blocker given hypotension with reflex tachy  -diuresis prn ; caution as pt hypotensive   -strict I/Os  -trend cardiac enzymes daily     #Perimyocarditis   Patient presenting with pleuritic chest pain with associated myalgias, chills, cough, fever, and generalized fatigue  , CKMB 84.8, Trop T 5.83 on admission; continue to trend daily   .6, ESR 72  EKG sinus tachycardia with diffuse ST elevations, and ST depressions in leads aVR and V1  Etiology likely viral versus autoimmune  -f/u RVP, Coxsackie, HIV, CMV, EBV serologies   -f/u MILAN, scleroderma, rheumatoid factor, IgG subsets, anti-mitochondrial ab, anti-dsDNA ab  -Urine drug screen negative  -TSH wnl  -started on colchicine 0.6mg BID  -plan for cardiac MRI once patient medically stable     Pulm  #r/o PE  Pleuritic chest pain, tachycardia, immobilization during previous hospitalization. Wells 3 (HR, immobilization). Subjectively R>L lower extremity swelling. D-dimer 680. Right sided pleural effusion.  -f/u doppler LEs  -continue to monitor respiratory status     #Pleural effusion  CXR reveals small right-sided pleural effusion. Likely 2/2 congestion i/s/o cardiogenic shock, r/o PE  -See plan above (cardiogenic shock, r/o PE)  -Continue to monitor     Renal  #Hyponatremia  Na 129->131->128  -patient asymptomatic ; likely hypervolemic hyponatremia 2/2 to cardiogenic shock  -Continue to monitor BMP  -diuresis prn   -If pt becomes symptomatic, consider ordering serum/urine osmolality and urine sodium    GI  #Transaminitis   Patient with ALP/ALT/AST remaining elevated; likely 2/2 congestive hepatopathy versus ischemia i/s/o cardiogenic shock  -abd US from OSH showing enlarged heterogenous liver   -continue to monitor  -hepatitis panel negative  -f/u abdominal ultrasound  -f/u acetaminophen level      Heme-Onc  #Anemia , normocytic   Hbg remaining 9.9->9.5. No hx of anemia. Currently menstruating, patient endorses menometrorrhagia. No other signs of bleeding.   - iron studies wnl  - trend CBC  - maintain active T&S  - transfuse for Hgb <7     Endocrine  No active issues    ID  No active issues    Other  F: tolerating PO, no IVF  E: replete K<4, Mg<2  N: NPO for catheterization today    VTE Prophylaxis: SCDs (right and left heart cath)  GI: Pantoprazole 40mg PO daily  C: Full Code  D: CCU   Ms Harrell is a 35 yo F w/ no PMHx admitted to [facility] for perimyocarditis and then transferred to [facility] for further work up and management of cardiogenic shock.    Neuro  AAOx3, No active issues    Cardiac  #Cardiogenic shock  Echo at [place] (1/10/22) revealed EF 30-35% with pericardial effusion  Lactate 2.4->2.2->2.1->1.4. Pro-BNP 22974. Elevated LFTs. Patient remaining hypotensive with likely compensatory tachycardia  Likely 2/2 perimyocarditis  -TTE 1/13 with severely reduced LV systolic function with EF 15% and RWMA; normal RV function, no valve disease   -CVP 15 PA2 26/40 CO 5.2 CI 2.5   -c/w Nipride gtt for afterload reduction (Goal MAP >65) --no inotropes as pt tachycardic   -HF consulted, recs appreciated   -HFNC 50/50 and bed tilting since desaturation lying flat  -right IJ with indwelling [place] cath for therapeutic monitoring  -left and right heart cath today to assess pressures and ischemic eval  -holding beta blocker given hypotension with reflex tachy  -increasing diuresis with furosemide 40mg IV bolus and increasing furosemide drip up to 7.5; caution as pt hypotensive   -strict I/Os  -trend cardiac enzymes daily     #Perimyocarditis   Patient presenting with pleuritic chest pain with associated myalgias, chills, cough, fever, and generalized fatigue  , CKMB 84.8, Trop T 5.83 on admission; continue to trend daily   .6, ESR 72  EKG sinus tachycardia with diffuse ST elevations, and ST depressions in leads aVR and V1  Etiology likely viral versus autoimmune  -f/u RVP, Coxsackie, HIV, CMV, EBV serologies   -f/u MILAN, scleroderma, rheumatoid factor, IgG subsets, anti-mitochondrial ab, anti-dsDNA ab  -Urine drug screen negative  -TSH wnl  -c/w colchicine 0.6mg BID  -plan for cardiac MRI once patient medically stable     Pulm  #r/o PE  Pleuritic chest pain, tachycardia, immobilization during previous hospitalization. Wells 3 (HR, immobilization). Subjectively R>L lower extremity swelling. D-dimer 680. Right sided pleural effusion.  -f/u doppler LEs  -continue to monitor respiratory status     #Pleural effusion  CXR reveals increased right-sided pleural effusion. Likely 2/2 congestion i/s/o cardiogenic shock, r/o PE  -See plan above (cardiogenic shock, r/o PE)  -Continue to monitor     Renal  #Hyponatremia  Na 129->131->128  -patient asymptomatic ; likely hypervolemic hyponatremia 2/2 to cardiogenic shock  -Continue to monitor BMP  -increasing diuresis with furosemide 40mg IV bolus and increasing furosemide drip up to 7.5; caution as pt hypotensive   -If pt becomes symptomatic, consider ordering serum/urine osmolality and urine sodium    GI  #Transaminitis   Patient with ALP/ALT/AST remaining elevated; likely 2/2 congestive hepatopathy versus ischemia i/s/o cardiogenic shock  -abd US from OSH showing enlarged heterogenous liver   -AST/ALT 91/286 trending down  -continue to monitor  -hepatitis panel negative  -f/u abdominal ultrasound  -f/u acetaminophen level      Heme-Onc  #Anemia , normocytic   Hbg remaining 9.9->9.5. No hx of anemia. Currently menstruating, patient endorses menometrorrhagia. No other signs of bleeding.   - iron studies wnl  - trend CBC  - maintain active T&S  - transfuse for Hgb <7     Endocrine  No active issues    ID  No active issues    Other  F: tolerating PO, no IVF  E: replete K<4, Mg<2  N: NPO    VTE Prophylaxis: Heparin gtt. SCDs (right and left heart cath)  GI: Pantoprazole 40mg PO daily  C: Full Code  D: CCU Ms Harrell is a 37 yo F w/ no PMHx admitted to [facility] for perimyocarditis and then transferred to [facility] for further work up and management of cardiogenic shock.    Neuro  AAOx3, No active issues    Cardiac  #Cardiogenic shock  Echo at [facility] (1/10/22) revealed EF 30-35% with pericardial effusion  Lactate 2.4->2.2->2.1->1.4. Pro-BNP 92983. Elevated LFTs. Patient remaining hypotensive with likely compensatory tachycardia  Likely 2/2 perimyocarditis  -TTE 1/13 with severely reduced LV systolic function with EF 15% and RWMA; normal RV function, no valve disease   -CVP 15 PA2 26/40 CO 4.5 CI 2.2 SV 38  -c/w Nipride gtt for afterload reduction (Goal MAP >65) --no inotropes as pt tachycardic   -HF consulted, recs appreciated   -HFNC 50/50 and bed tilting since desaturation lying flat  -right IJ with indwelling [place] cath for therapeutic monitoring  -left and right heart cath today to assess pressures and ischemic eval  -holding beta blocker given hypotension with reflex tachy  -increasing diuresis with furosemide 40mg IV bolus and increasing furosemide drip up to 7.5; caution as pt hypotensive   -strict I/Os  -trend cardiac enzymes daily     #Perimyocarditis   Patient presenting with pleuritic chest pain with associated myalgias, chills, cough, fever, and generalized fatigue  , CKMB 84.8, Trop T 5.83 on admission; continue to trend daily   .6, ESR 72  EKG sinus tachycardia with diffuse ST elevations, and ST depressions in leads aVR and V1  Etiology likely viral versus autoimmune  -f/u RVP, Coxsackie, HIV, CMV, EBV serologies   -f/u MILAN, scleroderma, rheumatoid factor, IgG subsets, anti-mitochondrial ab, anti-dsDNA ab  -Urine drug screen negative  -TSH wnl  -c/w colchicine 0.6mg BID  -plan for cardiac MRI once patient medically stable     Pulm  #r/o PE  Pleuritic chest pain, tachycardia, immobilization during previous hospitalization. Wells 3 (HR, immobilization). Subjectively R>L lower extremity swelling. D-dimer 680. Right sided pleural effusion.  -f/u doppler LEs  -continue to monitor respiratory status     #Pleural effusion  CXR reveals increased right-sided pleural effusion. Likely 2/2 congestion i/s/o cardiogenic shock, r/o PE  -See plan above (cardiogenic shock, r/o PE)  -Continue to monitor     Renal  #Hyponatremia  Na 129->131->128  -patient asymptomatic ; likely hypervolemic hyponatremia 2/2 to cardiogenic shock  -Continue to monitor BMP  -increasing diuresis with furosemide 40mg IV bolus and increasing furosemide drip up to 7.5; caution as pt hypotensive   -If pt becomes symptomatic, consider ordering serum/urine osmolality and urine sodium    GI  #Transaminitis   Patient with ALP/ALT/AST remaining elevated; likely 2/2 congestive hepatopathy versus ischemia i/s/o cardiogenic shock  -abd US from OSH showing enlarged heterogenous liver   -AST/ALT 91/286 trending down  -continue to monitor  -hepatitis panel negative  -f/u abdominal ultrasound  -f/u acetaminophen level      Heme-Onc  #Anemia , normocytic   Hbg remaining 9.9->9.5. No hx of anemia. Currently menstruating, patient endorses menometrorrhagia. No other signs of bleeding.   - iron studies wnl  - trend CBC  - maintain active T&S  - transfuse for Hgb <7     Endocrine  No active issues    ID  No active issues    Other  F: tolerating PO, no IVF  E: replete K<4, Mg<2  N: NPO    VTE Prophylaxis: Heparin gtt. SCDs (right and left heart cath)  GI: Pantoprazole 40mg PO daily  C: Full Code  D: CCU

## 2022-01-15 NOTE — PROGRESS NOTE ADULT - ATTENDING COMMENTS
36F, obese, no significant PMH w/ recent [facility] ER visit ~2weeks for PNA, tested positive for Covid, sent home on oral abx. Subsequently presents back to [facility] ER on 1/9 w/ nausea, abdominal discomfort, SOB -> found to have acute systolic CHF 2/2 Myocarditis c/b cardiogenic shock, tx'd to [facility] CCU for further mgmt    -sCHF - acute systolic CHF 2/2 Myocarditis(recent Covid infection 2wks ago, c/b cardiogenic shock s/p L/RHC - no obstructive CAD; RHC w/ severely elevated R/L filling pressures and low cardiac indices c/w cardiogenic shock (CO/CI - 2.7/1.4 on RHC) s/p IABP placement yesterday c/w 1:1, currently on Nipride gtt at 1.5mcg/kg/min, c/w Lasix gtt, increase to 7.5mg/hr; Repeat hemodynamics this AM improved - CO/CI 5.2/2.5 . Lactate cleared, normal renal function, good UOP and LFTs improving; c/w HFNC, saturating High 90s, no resp distress. Advanced HF following  -DASH diet  -DVT PPx  -Dispo: CCU  -Full Code    Sebastián Humphries MD  CCU Attending 36F, obese, no significant PMH w/ recent [facility] ER visit ~2weeks for PNA, tested positive for Covid, sent home on oral abx. Subsequently presents back to [facility] ER on 1/9 w/ nausea, abdominal discomfort, SOB -> found to have acute systolic CHF 2/2 Myocarditis c/b cardiogenic shock, tx'd to [facility] CCU for further mgmt    -sCHF - acute systolic CHF 2/2 Myocarditis(recent Covid infection 2wks ago, c/b cardiogenic shock s/p L/RHC - no obstructive CAD; RHC w/ severely elevated R/L filling pressures and low cardiac indices c/w cardiogenic shock (CO/CI - 2.7/1.4 on RHC) s/p IABP placement yesterday c/w 1:1, currently on Nipride gtt at 1.5mcg/kg/min, c/w Lasix gtt, increase to 7.5mg/hr, Will also add Spironolactone 25mg daily; Repeat hemodynamics this AM improved - CO/CI 5.2/2.5 . Lactate cleared, normal renal function, good UOP and LFTs improving; c/w HFNC, saturating High 90s, no resp distress. Advanced HF following  -DASH diet  -DVT PPx  -Dispo: CCU  -Full Code    Sebastián Humphries MD  CCU Attending

## 2022-01-15 NOTE — PROGRESS NOTE ADULT - SUBJECTIVE AND OBJECTIVE BOX
***Note in progress***    OVERNIGHT EVENTS: NAEO    SUBJECTIVE / INTERVAL HPI: Patient seen and examined at bedside. Patient denying chest pain, SOB, palpitations, cough. Patient denies fever, chills, HA, Dizziness, change in vision/hearing, N/V, abdominal pain, diarrhea, constipation, hematochezia/melena, dysuria, hematuria, new onset weakness/numbness, LE pain and/or swelling.    Remaining ROS negative     PHYSICAL EXAM:  General: NAD, lying in bed comfortably  HEENT: NC/AT; PERRL, anicteric sclera; MMM  Neck: supple  Cardiovascular: +S1/S2, RRR  Respiratory: CTA B/L; no W/R/R  Gastrointestinal: soft, NT/ND; +BSx4  Extremities: WWP; no edema, clubbing or cyanosis  Vascular: 2+ radial, DP/PT pulses B/L  Neurological: AAOx3; no focal deficits  Psychiatric: pleasant mood and affect  Dermatologic: no appreciable wounds or damage to the skin    VITAL SIGNS:  Vital Signs Last 24 Hrs  T(C): 37.1 (15 Willian 2022 12:00), Max: 37.6 (14 Jan 2022 22:30)  T(F): 98.8 (15 Willian 2022 12:00), Max: 99.7 (14 Jan 2022 22:30)  HR: 118 (15 Willian 2022 12:00) (113 - 139)  BP: 93/52 (15 Willian 2022 09:32) (93/52 - 93/52)  BP(mean): 66 (15 Willian 2022 09:32) (66 - 66)  RR: 16 (15 Willian 2022 12:00) (6 - 31)  SpO2: 100% (15 Willian 2022 12:00) (71% - 100%)    MEDICATIONS:  MEDICATIONS  (STANDING):  chlorhexidine 2% Cloths 1 Application(s) Topical <User Schedule>  colchicine 0.6 milliGRAM(s) Oral every 12 hours  dextrose 40% Gel 15 Gram(s) Oral once  dextrose 5%. 1000 milliLiter(s) (50 mL/Hr) IV Continuous <Continuous>  dextrose 5%. 1000 milliLiter(s) (100 mL/Hr) IV Continuous <Continuous>  dextrose 50% Injectable 25 Gram(s) IV Push once  dextrose 50% Injectable 12.5 Gram(s) IV Push once  dextrose 50% Injectable 25 Gram(s) IV Push once  furosemide Infusion 7.5 mG/Hr (3.75 mL/Hr) IV Continuous <Continuous>  glucagon  Injectable 1 milliGRAM(s) IntraMuscular once  heparin  Infusion. 1500 Unit(s)/Hr (15 mL/Hr) IV Continuous <Continuous>  influenza   Vaccine 0.5 milliLiter(s) IntraMuscular once  nitroprusside Infusion 1.5 MICROgram(s)/kG/Min (22.2 mL/Hr) IV Continuous <Continuous>  pantoprazole    Tablet 40 milliGRAM(s) Oral before breakfast  potassium chloride  20 mEq/100 mL IVPB 20 milliEquivalent(s) IV Intermittent every 4 hours    MEDICATIONS  (PRN):  acetaminophen     Tablet .. 650 milliGRAM(s) Oral every 6 hours PRN Mild Pain (1 - 3)      ALLERGIES:  Allergies    No Known Allergies    Intolerances        LABS:                        9.2    12.47 )-----------( 394      ( 15 Willian 2022 05:55 )             28.2     01-15    127<L>  |  93<L>  |  15  ----------------------------<  133<H>  3.7   |  23  |  0.83    Ca    7.8<L>      15 Willian 2022 05:55  Phos  3.3     01-15  Mg     2.2     01-15    TPro  5.8<L>  /  Alb  3.0<L>  /  TBili  0.6  /  DBili  x   /  AST  60<H>  /  ALT  204<H>  /  AlkPhos  181<H>  01-15    PT/INR - ( 13 Jan 2022 19:29 )   PT: 17.1 sec;   INR: 1.45          PTT - ( 15 Willian 2022 10:08 )  PTT:71.5 sec    CAPILLARY BLOOD GLUCOSE      POCT Blood Glucose.: 120 mg/dL (15 Willian 2022 07:26)      RADIOLOGY & ADDITIONAL TESTS: Reviewed. OVERNIGHT EVENTS: Called patient's sister to give update. 9pm Krishna: CO 3.8, CI 1.9 SVR 1284. Lactate 2.3 (up from 2.1 at noon). Simethicone and melatonin given. 3am . Held heparin for 1h and decreased rate from 18 to 15. 5am Krishna: CO 5.2, CI 2.5,     SUBJECTIVE / INTERVAL HPI: Patient seen and examined at bedside. No concerns, she is feeling today better.    Remaining ROS negative     PHYSICAL EXAM:  General: resting comfortably in bed in NAD  HEENT: NC/AT; PERRL, anicteric sclera; MMM  Neck: supple, no JVD  Cardiovascular: +S1/S2, tachycardic rate, regular rhythm, no murmurs   Respiratory: CTA B/L; no W/R/R, decreased breath sounds on right side   Gastrointestinal: soft, NT/ND; +BSx4  Extremities: WWP; no edema, clubbing or cyanosis. right radial A line in place.   Vascular: 1+ radial, DP pulses B/L, warm  Neurological: AAOx3; no focal deficits  Dermatologic: no appreciable wounds or damage to the skin    VITAL SIGNS:  Vital Signs Last 24 Hrs  T(C): 37.1 (15 Willian 2022 12:00), Max: 37.6 (14 Jan 2022 22:30)  T(F): 98.8 (15 Willian 2022 12:00), Max: 99.7 (14 Jan 2022 22:30)  HR: 118 (15 Willian 2022 12:00) (113 - 139)  BP: 93/52 (15 Willian 2022 09:32) (93/52 - 93/52)  BP(mean): 66 (15 Willian 2022 09:32) (66 - 66)  RR: 16 (15 Willian 2022 12:00) (6 - 31)  SpO2: 100% (15 Willian 2022 12:00) (71% - 100%)    MEDICATIONS:  MEDICATIONS  (STANDING):  chlorhexidine 2% Cloths 1 Application(s) Topical <User Schedule>  colchicine 0.6 milliGRAM(s) Oral every 12 hours  dextrose 40% Gel 15 Gram(s) Oral once  dextrose 5%. 1000 milliLiter(s) (50 mL/Hr) IV Continuous <Continuous>  dextrose 5%. 1000 milliLiter(s) (100 mL/Hr) IV Continuous <Continuous>  dextrose 50% Injectable 25 Gram(s) IV Push once  dextrose 50% Injectable 12.5 Gram(s) IV Push once  dextrose 50% Injectable 25 Gram(s) IV Push once  furosemide Infusion 7.5 mG/Hr (3.75 mL/Hr) IV Continuous <Continuous>  glucagon  Injectable 1 milliGRAM(s) IntraMuscular once  heparin  Infusion. 1500 Unit(s)/Hr (15 mL/Hr) IV Continuous <Continuous>  influenza   Vaccine 0.5 milliLiter(s) IntraMuscular once  nitroprusside Infusion 1.5 MICROgram(s)/kG/Min (22.2 mL/Hr) IV Continuous <Continuous>  pantoprazole    Tablet 40 milliGRAM(s) Oral before breakfast  potassium chloride  20 mEq/100 mL IVPB 20 milliEquivalent(s) IV Intermittent every 4 hours    MEDICATIONS  (PRN):  acetaminophen     Tablet .. 650 milliGRAM(s) Oral every 6 hours PRN Mild Pain (1 - 3)    ALLERGIES:  No Known Allergies    LABS:                        9.2    12.47 )-----------( 394      ( 15 Willian 2022 05:55 )             28.2     01-15    127<L>  |  93<L>  |  15  ----------------------------<  133<H>  3.7   |  23  |  0.83    Ca    7.8<L>      15 Willian 2022 05:55  Phos  3.3     01-15  Mg     2.2     01-15    TPro  5.8<L>  /  Alb  3.0<L>  /  TBili  0.6  /  DBili  x   /  AST  60<H>  /  ALT  204<H>  /  AlkPhos  181<H>  01-15    PT/INR - ( 13 Jan 2022 19:29 )   PT: 17.1 sec;   INR: 1.45       PTT - ( 15 Willian 2022 10:08 )  PTT:71.5 sec    CAPILLARY BLOOD GLUCOSE  POCT Blood Glucose.: 120 mg/dL (15 Willian 2022 07:26)    RADIOLOGY & ADDITIONAL TESTS: Reviewed.

## 2022-01-16 LAB
ALBUMIN SERPL ELPH-MCNC: 3.2 G/DL — LOW (ref 3.3–5)
ALBUMIN SERPL ELPH-MCNC: 3.2 G/DL — LOW (ref 3.3–5)
ALBUMIN SERPL ELPH-MCNC: 3.3 G/DL — SIGNIFICANT CHANGE UP (ref 3.3–5)
ALP SERPL-CCNC: 161 U/L — HIGH (ref 40–120)
ALP SERPL-CCNC: 162 U/L — HIGH (ref 40–120)
ALP SERPL-CCNC: 164 U/L — HIGH (ref 40–120)
ALT FLD-CCNC: 124 U/L — HIGH (ref 10–45)
ALT FLD-CCNC: 125 U/L — HIGH (ref 10–45)
ALT FLD-CCNC: 151 U/L — HIGH (ref 10–45)
ANION GAP SERPL CALC-SCNC: 10 MMOL/L — SIGNIFICANT CHANGE UP (ref 5–17)
ANION GAP SERPL CALC-SCNC: 12 MMOL/L — SIGNIFICANT CHANGE UP (ref 5–17)
ANION GAP SERPL CALC-SCNC: 9 MMOL/L — SIGNIFICANT CHANGE UP (ref 5–17)
APTT BLD: 79 SEC — HIGH (ref 27.5–35.5)
APTT BLD: 84.3 SEC — HIGH (ref 27.5–35.5)
AST SERPL-CCNC: 34 U/L — SIGNIFICANT CHANGE UP (ref 10–40)
AST SERPL-CCNC: 38 U/L — SIGNIFICANT CHANGE UP (ref 10–40)
AST SERPL-CCNC: 41 U/L — HIGH (ref 10–40)
BASE EXCESS BLDA CALC-SCNC: 7.1 MMOL/L — HIGH (ref -2–3)
BASE EXCESS BLDA CALC-SCNC: 7.2 MMOL/L — HIGH (ref -2–3)
BASE EXCESS BLDA CALC-SCNC: 7.4 MMOL/L — HIGH (ref -2–3)
BASE EXCESS BLDMV CALC-SCNC: 6.8 MMOL/L — SIGNIFICANT CHANGE UP
BASE EXCESS BLDMV CALC-SCNC: 6.8 MMOL/L — SIGNIFICANT CHANGE UP
BASE EXCESS BLDMV CALC-SCNC: 9 MMOL/L — SIGNIFICANT CHANGE UP
BASE EXCESS BLDMV CALC-SCNC: 9.9 MMOL/L — SIGNIFICANT CHANGE UP
BASE EXCESS BLDV CALC-SCNC: 7.6 MMOL/L — HIGH (ref -2–3)
BASOPHILS # BLD AUTO: 0.04 K/UL — SIGNIFICANT CHANGE UP (ref 0–0.2)
BASOPHILS NFR BLD AUTO: 0.3 % — SIGNIFICANT CHANGE UP (ref 0–2)
BILIRUB SERPL-MCNC: 0.4 MG/DL — SIGNIFICANT CHANGE UP (ref 0.2–1.2)
BILIRUB SERPL-MCNC: 0.5 MG/DL — SIGNIFICANT CHANGE UP (ref 0.2–1.2)
BILIRUB SERPL-MCNC: 0.5 MG/DL — SIGNIFICANT CHANGE UP (ref 0.2–1.2)
BUN SERPL-MCNC: 8 MG/DL — SIGNIFICANT CHANGE UP (ref 7–23)
BUN SERPL-MCNC: 9 MG/DL — SIGNIFICANT CHANGE UP (ref 7–23)
BUN SERPL-MCNC: 9 MG/DL — SIGNIFICANT CHANGE UP (ref 7–23)
CALCIUM SERPL-MCNC: 8 MG/DL — LOW (ref 8.4–10.5)
CALCIUM SERPL-MCNC: 8.1 MG/DL — LOW (ref 8.4–10.5)
CALCIUM SERPL-MCNC: 8.5 MG/DL — SIGNIFICANT CHANGE UP (ref 8.4–10.5)
CHLORIDE SERPL-SCNC: 88 MMOL/L — LOW (ref 96–108)
CHLORIDE SERPL-SCNC: 90 MMOL/L — LOW (ref 96–108)
CHLORIDE SERPL-SCNC: 92 MMOL/L — LOW (ref 96–108)
CK MB CFR SERPL CALC: 13 NG/ML — HIGH (ref 0–6.7)
CK SERPL-CCNC: 100 U/L — SIGNIFICANT CHANGE UP (ref 25–170)
CO2 BLDA-SCNC: 30 MMOL/L — HIGH (ref 19–24)
CO2 BLDA-SCNC: 31 MMOL/L — HIGH (ref 19–24)
CO2 BLDA-SCNC: 31 MMOL/L — HIGH (ref 19–24)
CO2 BLDMV-SCNC: 33 MMOL/L — SIGNIFICANT CHANGE UP
CO2 BLDMV-SCNC: 33 MMOL/L — SIGNIFICANT CHANGE UP
CO2 SERPL-SCNC: 27 MMOL/L — SIGNIFICANT CHANGE UP (ref 22–31)
CO2 SERPL-SCNC: 29 MMOL/L — SIGNIFICANT CHANGE UP (ref 22–31)
CO2 SERPL-SCNC: 29 MMOL/L — SIGNIFICANT CHANGE UP (ref 22–31)
COHGB MFR BLDMV: 0.7 % — SIGNIFICANT CHANGE UP
COHGB MFR BLDMV: 1.2 % — SIGNIFICANT CHANGE UP
COHGB MFR BLDV: 0.8 % — SIGNIFICANT CHANGE UP
CREAT SERPL-MCNC: 0.7 MG/DL — SIGNIFICANT CHANGE UP (ref 0.5–1.3)
CREAT SERPL-MCNC: 0.72 MG/DL — SIGNIFICANT CHANGE UP (ref 0.5–1.3)
CREAT SERPL-MCNC: 0.75 MG/DL — SIGNIFICANT CHANGE UP (ref 0.5–1.3)
EOSINOPHIL # BLD AUTO: 0.32 K/UL — SIGNIFICANT CHANGE UP (ref 0–0.5)
EOSINOPHIL NFR BLD AUTO: 2.8 % — SIGNIFICANT CHANGE UP (ref 0–6)
GAS PNL BLDMV: SIGNIFICANT CHANGE UP
GLUCOSE SERPL-MCNC: 108 MG/DL — HIGH (ref 70–99)
GLUCOSE SERPL-MCNC: 110 MG/DL — HIGH (ref 70–99)
GLUCOSE SERPL-MCNC: 114 MG/DL — HIGH (ref 70–99)
HCO3 BLDA-SCNC: 29 MMOL/L — HIGH (ref 21–28)
HCO3 BLDA-SCNC: 30 MMOL/L — HIGH (ref 21–28)
HCO3 BLDA-SCNC: 30 MMOL/L — HIGH (ref 21–28)
HCO3 BLDMV-SCNC: 31 MMOL/L — SIGNIFICANT CHANGE UP
HCO3 BLDMV-SCNC: 31 MMOL/L — SIGNIFICANT CHANGE UP
HCO3 BLDMV-SCNC: 34 MMOL/L — SIGNIFICANT CHANGE UP
HCO3 BLDMV-SCNC: 34 MMOL/L — SIGNIFICANT CHANGE UP
HCO3 BLDV-SCNC: 32 MMOL/L — HIGH (ref 22–29)
HCT VFR BLD CALC: 26.7 % — LOW (ref 34.5–45)
HCT VFR BLD CALC: 26.8 % — LOW (ref 34.5–45)
HCT VFR BLD CALC: 27 % — LOW (ref 34.5–45)
HGB BLD CALC-MCNC: 8.1 G/DL — LOW (ref 11.7–16.1)
HGB BLD-MCNC: 8.4 G/DL — LOW (ref 11.5–15.5)
HGB BLD-MCNC: 8.5 G/DL — LOW (ref 11.5–15.5)
HGB BLD-MCNC: 8.7 G/DL — LOW (ref 11.5–15.5)
HGB FLD-MCNC: 8.9 G/DL — LOW (ref 11.7–16.1)
HGB FLD-MCNC: 9 G/DL — LOW (ref 11.7–16.1)
IMM GRANULOCYTES NFR BLD AUTO: 2 % — HIGH (ref 0–1.5)
LACTATE SERPL-SCNC: 1.1 MMOL/L — SIGNIFICANT CHANGE UP (ref 0.5–2)
LACTATE SERPL-SCNC: 1.1 MMOL/L — SIGNIFICANT CHANGE UP (ref 0.5–2)
LACTATE SERPL-SCNC: 1.3 MMOL/L — SIGNIFICANT CHANGE UP (ref 0.5–2)
LYMPHOCYTES # BLD AUTO: 1.91 K/UL — SIGNIFICANT CHANGE UP (ref 1–3.3)
LYMPHOCYTES # BLD AUTO: 16.7 % — SIGNIFICANT CHANGE UP (ref 13–44)
MAGNESIUM SERPL-MCNC: 2 MG/DL — SIGNIFICANT CHANGE UP (ref 1.6–2.6)
MCHC RBC-ENTMCNC: 28 PG — SIGNIFICANT CHANGE UP (ref 27–34)
MCHC RBC-ENTMCNC: 28.1 PG — SIGNIFICANT CHANGE UP (ref 27–34)
MCHC RBC-ENTMCNC: 29 PG — SIGNIFICANT CHANGE UP (ref 27–34)
MCHC RBC-ENTMCNC: 31.1 GM/DL — LOW (ref 32–36)
MCHC RBC-ENTMCNC: 31.7 GM/DL — LOW (ref 32–36)
MCHC RBC-ENTMCNC: 32.6 GM/DL — SIGNIFICANT CHANGE UP (ref 32–36)
MCV RBC AUTO: 88.4 FL — SIGNIFICANT CHANGE UP (ref 80–100)
MCV RBC AUTO: 89 FL — SIGNIFICANT CHANGE UP (ref 80–100)
MCV RBC AUTO: 90 FL — SIGNIFICANT CHANGE UP (ref 80–100)
METHGB MFR BLDMV: 0 % — SIGNIFICANT CHANGE UP
METHGB MFR BLDMV: 0.6 % — SIGNIFICANT CHANGE UP
METHGB MFR BLDV: 0 % — SIGNIFICANT CHANGE UP
MONOCYTES # BLD AUTO: 0.99 K/UL — HIGH (ref 0–0.9)
MONOCYTES NFR BLD AUTO: 8.7 % — SIGNIFICANT CHANGE UP (ref 2–14)
NEUTROPHILS # BLD AUTO: 7.94 K/UL — HIGH (ref 1.8–7.4)
NEUTROPHILS NFR BLD AUTO: 69.5 % — SIGNIFICANT CHANGE UP (ref 43–77)
NRBC # BLD: 1 /100 WBCS — HIGH (ref 0–0)
O2 CT VFR BLD CALC: 29 MMHG — SIGNIFICANT CHANGE UP
O2 CT VFR BLD CALC: 30 MMHG — SIGNIFICANT CHANGE UP
O2 CT VFR BLD CALC: 30 MMHG — SIGNIFICANT CHANGE UP
O2 CT VFR BLD CALC: 31 MMHG — SIGNIFICANT CHANGE UP
OXYHGB MFR BLDMV: 49 % — SIGNIFICANT CHANGE UP
OXYHGB MFR BLDMV: 51.6 % — SIGNIFICANT CHANGE UP
PCO2 BLDA: 32 MMHG — SIGNIFICANT CHANGE UP (ref 32–35)
PCO2 BLDA: 35 MMHG — SIGNIFICANT CHANGE UP (ref 32–35)
PCO2 BLDA: 36 MMHG — HIGH (ref 32–35)
PCO2 BLDMV: 44 MMHG — SIGNIFICANT CHANGE UP
PCO2 BLDMV: 45 MMHG — SIGNIFICANT CHANGE UP
PCO2 BLDV: 44 MMHG — HIGH (ref 39–42)
PH BLDA: 7.53 — HIGH (ref 7.35–7.45)
PH BLDA: 7.54 — HIGH (ref 7.35–7.45)
PH BLDA: 7.57 — HIGH (ref 7.35–7.45)
PH BLDMV: 7.46 — SIGNIFICANT CHANGE UP
PH BLDMV: 7.46 — SIGNIFICANT CHANGE UP
PH BLDMV: 7.49 — SIGNIFICANT CHANGE UP
PH BLDMV: 7.49 — SIGNIFICANT CHANGE UP
PH BLDV: 7.47 — HIGH (ref 7.32–7.43)
PHOSPHATE SERPL-MCNC: 2.9 MG/DL — SIGNIFICANT CHANGE UP (ref 2.5–4.5)
PLATELET # BLD AUTO: 304 K/UL — SIGNIFICANT CHANGE UP (ref 150–400)
PLATELET # BLD AUTO: 328 K/UL — SIGNIFICANT CHANGE UP (ref 150–400)
PLATELET # BLD AUTO: 344 K/UL — SIGNIFICANT CHANGE UP (ref 150–400)
PO2 BLDA: 102 MMHG — SIGNIFICANT CHANGE UP (ref 83–108)
PO2 BLDA: 125 MMHG — HIGH (ref 83–108)
PO2 BLDA: 132 MMHG — HIGH (ref 83–108)
PO2 BLDV: 31 MMHG — SIGNIFICANT CHANGE UP (ref 25–45)
POTASSIUM SERPL-MCNC: 3.4 MMOL/L — LOW (ref 3.5–5.3)
POTASSIUM SERPL-MCNC: 3.9 MMOL/L — SIGNIFICANT CHANGE UP (ref 3.5–5.3)
POTASSIUM SERPL-MCNC: 4 MMOL/L — SIGNIFICANT CHANGE UP (ref 3.5–5.3)
POTASSIUM SERPL-SCNC: 3.4 MMOL/L — LOW (ref 3.5–5.3)
POTASSIUM SERPL-SCNC: 3.9 MMOL/L — SIGNIFICANT CHANGE UP (ref 3.5–5.3)
POTASSIUM SERPL-SCNC: 4 MMOL/L — SIGNIFICANT CHANGE UP (ref 3.5–5.3)
PROT SERPL-MCNC: 6.2 G/DL — SIGNIFICANT CHANGE UP (ref 6–8.3)
PROT SERPL-MCNC: 6.3 G/DL — SIGNIFICANT CHANGE UP (ref 6–8.3)
PROT SERPL-MCNC: 6.3 G/DL — SIGNIFICANT CHANGE UP (ref 6–8.3)
RBC # BLD: 3 M/UL — LOW (ref 3.8–5.2)
RBC # BLD: 3 M/UL — LOW (ref 3.8–5.2)
RBC # BLD: 3.03 M/UL — LOW (ref 3.8–5.2)
RBC # FLD: 15.9 % — HIGH (ref 10.3–14.5)
RBC # FLD: 16.1 % — HIGH (ref 10.3–14.5)
RBC # FLD: 16.1 % — HIGH (ref 10.3–14.5)
SAO2 % BLDA: 98.2 % — HIGH (ref 94–98)
SAO2 % BLDA: 98.3 % — HIGH (ref 94–98)
SAO2 % BLDA: 99.1 % — HIGH (ref 94–98)
SAO2 % BLDMV: 50 % — SIGNIFICANT CHANGE UP
SAO2 % BLDMV: 50 % — SIGNIFICANT CHANGE UP
SAO2 % BLDMV: 52 % — SIGNIFICANT CHANGE UP
SAO2 % BLDMV: 52 % — SIGNIFICANT CHANGE UP
SAO2 % BLDV: 54.6 % — LOW (ref 67–88)
SODIUM SERPL-SCNC: 127 MMOL/L — LOW (ref 135–145)
SODIUM SERPL-SCNC: 129 MMOL/L — LOW (ref 135–145)
SODIUM SERPL-SCNC: 130 MMOL/L — LOW (ref 135–145)
TROPONIN T SERPL-MCNC: 2.71 NG/ML — CRITICAL HIGH (ref 0–0.01)
WBC # BLD: 11.43 K/UL — HIGH (ref 3.8–10.5)
WBC # BLD: 8.82 K/UL — SIGNIFICANT CHANGE UP (ref 3.8–10.5)
WBC # BLD: 9.97 K/UL — SIGNIFICANT CHANGE UP (ref 3.8–10.5)
WBC # FLD AUTO: 11.43 K/UL — HIGH (ref 3.8–10.5)
WBC # FLD AUTO: 8.82 K/UL — SIGNIFICANT CHANGE UP (ref 3.8–10.5)
WBC # FLD AUTO: 9.97 K/UL — SIGNIFICANT CHANGE UP (ref 3.8–10.5)

## 2022-01-16 PROCEDURE — 99292 CRITICAL CARE ADDL 30 MIN: CPT

## 2022-01-16 PROCEDURE — 99291 CRITICAL CARE FIRST HOUR: CPT

## 2022-01-16 PROCEDURE — 71045 X-RAY EXAM CHEST 1 VIEW: CPT | Mod: 26

## 2022-01-16 RX ORDER — LANOLIN ALCOHOL/MO/W.PET/CERES
5 CREAM (GRAM) TOPICAL ONCE
Refills: 0 | Status: COMPLETED | OUTPATIENT
Start: 2022-01-16 | End: 2022-01-16

## 2022-01-16 RX ORDER — POTASSIUM CHLORIDE 20 MEQ
20 PACKET (EA) ORAL EVERY 4 HOURS
Refills: 0 | Status: COMPLETED | OUTPATIENT
Start: 2022-01-16 | End: 2022-01-16

## 2022-01-16 RX ADMIN — Medication 5 MILLIGRAM(S): at 23:12

## 2022-01-16 RX ADMIN — PANTOPRAZOLE SODIUM 40 MILLIGRAM(S): 20 TABLET, DELAYED RELEASE ORAL at 06:09

## 2022-01-16 RX ADMIN — Medication 0.6 MILLIGRAM(S): at 21:25

## 2022-01-16 RX ADMIN — Medication 650 MILLIGRAM(S): at 03:00

## 2022-01-16 RX ADMIN — SPIRONOLACTONE 25 MILLIGRAM(S): 25 TABLET, FILM COATED ORAL at 06:09

## 2022-01-16 RX ADMIN — Medication 650 MILLIGRAM(S): at 02:22

## 2022-01-16 RX ADMIN — Medication 100 MILLIEQUIVALENT(S): at 21:25

## 2022-01-16 RX ADMIN — SODIUM NITROPRUSSIDE 22.2 MICROGRAM(S)/KG/MIN: 50 INJECTION INTRAVENOUS at 14:05

## 2022-01-16 RX ADMIN — Medication 40 MILLIEQUIVALENT(S): at 00:07

## 2022-01-16 RX ADMIN — Medication 0.6 MILLIGRAM(S): at 09:50

## 2022-01-16 RX ADMIN — SODIUM NITROPRUSSIDE 22.2 MICROGRAM(S)/KG/MIN: 50 INJECTION INTRAVENOUS at 01:09

## 2022-01-16 RX ADMIN — Medication 30 MILLILITER(S): at 23:12

## 2022-01-16 RX ADMIN — Medication 100 MILLIEQUIVALENT(S): at 18:26

## 2022-01-16 RX ADMIN — CHLORHEXIDINE GLUCONATE 1 APPLICATION(S): 213 SOLUTION TOPICAL at 06:19

## 2022-01-16 NOTE — PROGRESS NOTE ADULT - SUBJECTIVE AND OBJECTIVE BOX
OVERNIGHT EVENTS: 7pm Krishna: CO 4.7, CI 2.3, SVR 1021. Started spironolactone 25mg. 11pm PTT therapeutic at 71. 5am Krishna CO 4.9, CI 2.4, . 5am PTT therapeutic at 79.    SUBJECTIVE / INTERVAL HPI: Patient seen and examined at bedside. Reports she is feeling better and her breathing is ok. Reports her chest pain is resolved. Endorsing continued cough. Denies any other complaints at this time. Poor PO intake yesterday, added ensure to diet and maalox prn for dyspepsia. On HFNC, tolerating well.  Remaining ROS negative       PHYSICAL EXAM:    General: resting comfortably in bed in NAD  HEENT: NC/AT; PERRL, anicteric sclera; MMM  Neck: supple, no JVD. swan in right IJ.  Cardiovascular: +S1/S2, tachycardic rate, regular rhythm, no murmurs   Respiratory: CTA B/L; no W/R/R, decreased breath sounds on right side. on HFNC. no increased WOB.   Gastrointestinal: soft, NT/ND; +BSx4  Extremities: WWP; no edema, clubbing or cyanosis. right femoral A line for IABP  Vascular: 2+ radial, DP pulses B/L  Neurological: AAOx3; no focal deficits  Dermatologic: no appreciable wounds or damage to the skin        VITAL SIGNS:  Vital Signs Last 24 Hrs  T(C): 37.1 (16 Jan 2022 10:31), Max: 37.4 (15 Willian 2022 21:19)  T(F): 98.8 (16 Jan 2022 10:31), Max: 99.3 (15 Willian 2022 21:19)  HR: 112 (16 Jan 2022 15:31) (105 - 122)  BP: 87/52 (15 Willian 2022 18:10) (87/52 - 87/52)  BP(mean): 65 (15 Willian 2022 18:10) (65 - 65)  RR: 22 (16 Jan 2022 15:31) (12 - 23)  SpO2: 98% (16 Jan 2022 15:31) (91% - 100%)      MEDICATIONS:  MEDICATIONS  (STANDING):  chlorhexidine 2% Cloths 1 Application(s) Topical <User Schedule>  colchicine 0.6 milliGRAM(s) Oral every 12 hours  dextrose 40% Gel 15 Gram(s) Oral once  dextrose 5%. 1000 milliLiter(s) (50 mL/Hr) IV Continuous <Continuous>  dextrose 5%. 1000 milliLiter(s) (100 mL/Hr) IV Continuous <Continuous>  dextrose 50% Injectable 25 Gram(s) IV Push once  dextrose 50% Injectable 12.5 Gram(s) IV Push once  dextrose 50% Injectable 25 Gram(s) IV Push once  furosemide Infusion 7.5 mG/Hr (3.75 mL/Hr) IV Continuous <Continuous>  glucagon  Injectable 1 milliGRAM(s) IntraMuscular once  influenza   Vaccine 0.5 milliLiter(s) IntraMuscular once  nitroprusside Infusion 1.5 MICROgram(s)/kG/Min (22.2 mL/Hr) IV Continuous <Continuous>  pantoprazole    Tablet 40 milliGRAM(s) Oral before breakfast  spironolactone 25 milliGRAM(s) Oral every 24 hours    MEDICATIONS  (PRN):  acetaminophen     Tablet .. 650 milliGRAM(s) Oral every 6 hours PRN Mild Pain (1 - 3)  aluminum hydroxide/magnesium hydroxide/simethicone Suspension 30 milliLiter(s) Oral every 4 hours PRN Dyspepsia      ALLERGIES:  Allergies    No Known Allergies    Intolerances        LABS:                        8.7    9.97  )-----------( 328      ( 16 Jan 2022 16:06 )             26.7     01-16    130<L>  |  92<L>  |  9   ----------------------------<  114<H>  3.9   |  29  |  0.72    Ca    8.0<L>      16 Jan 2022 05:34  Phos  2.9     01-16  Mg     2.0     01-16    TPro  6.2  /  Alb  3.2<L>  /  TBili  0.5  /  DBili  x   /  AST  41<H>  /  ALT  151<H>  /  AlkPhos  164<H>  01-16    PTT - ( 16 Jan 2022 10:31 )  PTT:84.3 sec    CAPILLARY BLOOD GLUCOSE      POCT Blood Glucose.: 120 mg/dL (15 Willian 2022 07:26)      RADIOLOGY & ADDITIONAL TESTS: Reviewed.

## 2022-01-16 NOTE — PROGRESS NOTE ADULT - ATTENDING COMMENTS
36F, obese, no significant PMH w/ recent [place] ER visit ~2weeks for PNA, tested positive for Covid, sent home on oral abx. Subsequently presents back to [place] ER on 1/9 w/ nausea, abdominal discomfort, SOB -> found to have acute systolic CHF 2/2 Myocarditis c/b cardiogenic shock, tx'd to [facility] CCU for further mgmt    -sCHF - acute systolic CHF 2/2 Myocarditis(recent Covid infection 2wks ago, c/b cardiogenic shock s/p L/RHC - no obstructive CAD; RHC w/ severely elevated R/L filling pressures and low cardiac indices c/w cardiogenic shock - s/p IABP placement c/w 1:1 - will try on 1:2 tmrw w/ repeat hemodynamics;, currently tolerating Nipride gtt at 1.5mcg/kg/min, c/w Lasix gtt @ 7.5mg/hr, c/w Spironolactone 25mg daily; Repeat hemodynamics this AM stable - CO/CI 4.9/2.4 . Lactate normal, remains warm on exam, normal renal function, good UOP and transaminitis resolved; c/w HFNC, saturating %, no resp distress. Advanced HF following  -DASH diet  -DVT PPx  -Dispo: CCU  -Full Code    Sebastián Humphries MD  CCU Attending

## 2022-01-16 NOTE — PROGRESS NOTE ADULT - ASSESSMENT
Ms Harrell is a 35 yo F w/ no PMHx admitted to [place] for perimyocarditis and then transferred to [facility] for further work up and management of cardiogenic shock.    Neuro  AAOx3, No active issues    Cardiac  #Cardiogenic shock  Echo at [place] (1/10/22) revealed EF 30-35% with pericardial effusion  Lactate now cleared. Pro-BNP 66931. Elevated LFTs. 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 [place] 3 (HR, immobilization). Subjectively R>L lower extremity swelling. D-dimer 680. Right sided pleural effusion.  -f/u doppler LEs  -continue to monitor respiratory status     #Pleural effusion  CXR reveals small right-sided pleural effusion. Likely 2/2 congestion i/s/o cardiogenic shock, r/o PE  -See plan above (cardiogenic shock, r/o PE)  -Continue to monitor     Renal  #Hyponatremia  Patient persistently hyponatremic since admission; Na 130 today  -patient asymptomatic ; likely hypervolemic hyponatremia 2/2 to cardiogenic shock  -Continue to monitor BMP  -c/w lasix drip      GI  #Transaminitis   Patient with ALP/ALT/AST remaining elevated; likely 2/2 congestive hepatopathy versus ischemia i/s/o cardiogenic shock  -abd US from OSH showing enlarged heterogenous liver   -continue to monitor  -hepatitis panel negative  -f/u abdominal ultrasound    Heme-Onc  #Anemia , normocytic   Hbg remaining 8-9. No hx of anemia. Currently menstruating, patient endorses menometrorrhagia. No other signs of bleeding.   - iron studies wnl  -d/colin heparin drip in setting of dropping hgb  - trend CBC  - maintain active T&S  - transfuse for Hgb <7     Endocrine  No active issues    ID  No active issues    Other  F: tolerating PO, no IVF  E: replete K<4, Mg<2  N: regular diet with ensure     VTE Prophylaxis: SCDs   GI: Pantoprazole 40mg PO daily  C: Full Code  D: CCU

## 2022-01-17 LAB
ALBUMIN SERPL ELPH-MCNC: 3.2 G/DL — LOW (ref 3.3–5)
ALBUMIN SERPL ELPH-MCNC: 3.2 G/DL — LOW (ref 3.3–5)
ALP SERPL-CCNC: 168 U/L — HIGH (ref 40–120)
ALP SERPL-CCNC: 169 U/L — HIGH (ref 40–120)
ALT FLD-CCNC: 108 U/L — HIGH (ref 10–45)
ALT FLD-CCNC: 109 U/L — HIGH (ref 10–45)
ANA TITR SER: NEGATIVE — SIGNIFICANT CHANGE UP
ANION GAP SERPL CALC-SCNC: 11 MMOL/L — SIGNIFICANT CHANGE UP (ref 5–17)
ANION GAP SERPL CALC-SCNC: 11 MMOL/L — SIGNIFICANT CHANGE UP (ref 5–17)
ANION GAP SERPL CALC-SCNC: 13 MMOL/L — SIGNIFICANT CHANGE UP (ref 5–17)
APTT BLD: 53.2 SEC — HIGH (ref 27.5–35.5)
APTT BLD: 68.8 SEC — HIGH (ref 27.5–35.5)
AST SERPL-CCNC: 38 U/L — SIGNIFICANT CHANGE UP (ref 10–40)
AST SERPL-CCNC: 49 U/L — HIGH (ref 10–40)
BASE EXCESS BLDA CALC-SCNC: 6.4 MMOL/L — HIGH (ref -2–3)
BASE EXCESS BLDA CALC-SCNC: 7.1 MMOL/L — HIGH (ref -2–3)
BASE EXCESS BLDA CALC-SCNC: 8.7 MMOL/L — HIGH (ref -2–3)
BASE EXCESS BLDMV CALC-SCNC: 5.7 MMOL/L — SIGNIFICANT CHANGE UP
BASE EXCESS BLDMV CALC-SCNC: 6 MMOL/L — SIGNIFICANT CHANGE UP
BASE EXCESS BLDMV CALC-SCNC: 6.8 MMOL/L — SIGNIFICANT CHANGE UP
BASOPHILS # BLD AUTO: 0.04 K/UL — SIGNIFICANT CHANGE UP (ref 0–0.2)
BASOPHILS NFR BLD AUTO: 0.5 % — SIGNIFICANT CHANGE UP (ref 0–2)
BILIRUB SERPL-MCNC: 0.4 MG/DL — SIGNIFICANT CHANGE UP (ref 0.2–1.2)
BILIRUB SERPL-MCNC: 0.4 MG/DL — SIGNIFICANT CHANGE UP (ref 0.2–1.2)
BUN SERPL-MCNC: 10 MG/DL — SIGNIFICANT CHANGE UP (ref 7–23)
BUN SERPL-MCNC: 8 MG/DL — SIGNIFICANT CHANGE UP (ref 7–23)
BUN SERPL-MCNC: 9 MG/DL — SIGNIFICANT CHANGE UP (ref 7–23)
CALCIUM SERPL-MCNC: 8.5 MG/DL — SIGNIFICANT CHANGE UP (ref 8.4–10.5)
CALCIUM SERPL-MCNC: 8.6 MG/DL — SIGNIFICANT CHANGE UP (ref 8.4–10.5)
CALCIUM SERPL-MCNC: 8.6 MG/DL — SIGNIFICANT CHANGE UP (ref 8.4–10.5)
CHLORIDE SERPL-SCNC: 91 MMOL/L — LOW (ref 96–108)
CHLORIDE SERPL-SCNC: 93 MMOL/L — LOW (ref 96–108)
CHLORIDE SERPL-SCNC: 94 MMOL/L — LOW (ref 96–108)
CK MB CFR SERPL CALC: 4.5 NG/ML — SIGNIFICANT CHANGE UP (ref 0–6.7)
CK SERPL-CCNC: 46 U/L — SIGNIFICANT CHANGE UP (ref 25–170)
CO2 BLDA-SCNC: 30 MMOL/L — HIGH (ref 19–24)
CO2 BLDA-SCNC: 31 MMOL/L — HIGH (ref 19–24)
CO2 BLDA-SCNC: 33 MMOL/L — HIGH (ref 19–24)
CO2 BLDMV-SCNC: 32 MMOL/L — SIGNIFICANT CHANGE UP
CO2 BLDMV-SCNC: 32 MMOL/L — SIGNIFICANT CHANGE UP
CO2 BLDMV-SCNC: 33 MMOL/L — SIGNIFICANT CHANGE UP
CO2 SERPL-SCNC: 24 MMOL/L — SIGNIFICANT CHANGE UP (ref 22–31)
CO2 SERPL-SCNC: 26 MMOL/L — SIGNIFICANT CHANGE UP (ref 22–31)
CO2 SERPL-SCNC: 27 MMOL/L — SIGNIFICANT CHANGE UP (ref 22–31)
COHGB MFR BLDMV: 0.9 % — SIGNIFICANT CHANGE UP
COHGB MFR BLDMV: 1.3 % — SIGNIFICANT CHANGE UP
COHGB MFR BLDMV: 1.7 % — SIGNIFICANT CHANGE UP
CREAT SERPL-MCNC: 0.68 MG/DL — SIGNIFICANT CHANGE UP (ref 0.5–1.3)
CREAT SERPL-MCNC: 0.72 MG/DL — SIGNIFICANT CHANGE UP (ref 0.5–1.3)
CREAT SERPL-MCNC: 0.72 MG/DL — SIGNIFICANT CHANGE UP (ref 0.5–1.3)
ENA SCL70 AB SER-ACNC: <0.2 AI — SIGNIFICANT CHANGE UP
EOSINOPHIL # BLD AUTO: 0.2 K/UL — SIGNIFICANT CHANGE UP (ref 0–0.5)
EOSINOPHIL NFR BLD AUTO: 2.5 % — SIGNIFICANT CHANGE UP (ref 0–6)
GAS PNL BLDA: SIGNIFICANT CHANGE UP
GAS PNL BLDMV: SIGNIFICANT CHANGE UP
GAS PNL BLDMV: SIGNIFICANT CHANGE UP
GLUCOSE SERPL-MCNC: 101 MG/DL — HIGH (ref 70–99)
GLUCOSE SERPL-MCNC: 112 MG/DL — HIGH (ref 70–99)
GLUCOSE SERPL-MCNC: 125 MG/DL — HIGH (ref 70–99)
HCO3 BLDA-SCNC: 29 MMOL/L — HIGH (ref 21–28)
HCO3 BLDA-SCNC: 30 MMOL/L — HIGH (ref 21–28)
HCO3 BLDA-SCNC: 32 MMOL/L — HIGH (ref 21–28)
HCO3 BLDMV-SCNC: 31 MMOL/L — SIGNIFICANT CHANGE UP
HCT VFR BLD CALC: 27.4 % — LOW (ref 34.5–45)
HCT VFR BLD CALC: 28.1 % — LOW (ref 34.5–45)
HCT VFR BLD CALC: 28.4 % — LOW (ref 34.5–45)
HGB BLD-MCNC: 8.6 G/DL — LOW (ref 11.5–15.5)
HGB BLD-MCNC: 9 G/DL — LOW (ref 11.5–15.5)
HGB BLD-MCNC: 9.1 G/DL — LOW (ref 11.5–15.5)
HGB FLD-MCNC: 10.4 G/DL — LOW (ref 11.7–16.1)
HGB FLD-MCNC: 8.8 G/DL — LOW (ref 11.7–16.1)
HGB FLD-MCNC: 9.4 G/DL — LOW (ref 11.7–16.1)
IMM GRANULOCYTES NFR BLD AUTO: 1.9 % — HIGH (ref 0–1.5)
LACTATE SERPL-SCNC: 0.9 MMOL/L — SIGNIFICANT CHANGE UP (ref 0.5–2)
LACTATE SERPL-SCNC: 1.8 MMOL/L — SIGNIFICANT CHANGE UP (ref 0.5–2)
LACTATE SERPL-SCNC: 2.2 MMOL/L — HIGH (ref 0.5–2)
LYMPHOCYTES # BLD AUTO: 1.77 K/UL — SIGNIFICANT CHANGE UP (ref 1–3.3)
LYMPHOCYTES # BLD AUTO: 22 % — SIGNIFICANT CHANGE UP (ref 13–44)
MAGNESIUM SERPL-MCNC: 2.3 MG/DL — SIGNIFICANT CHANGE UP (ref 1.6–2.6)
MCHC RBC-ENTMCNC: 28.3 PG — SIGNIFICANT CHANGE UP (ref 27–34)
MCHC RBC-ENTMCNC: 28.9 PG — SIGNIFICANT CHANGE UP (ref 27–34)
MCHC RBC-ENTMCNC: 29.1 PG — SIGNIFICANT CHANGE UP (ref 27–34)
MCHC RBC-ENTMCNC: 31.4 GM/DL — LOW (ref 32–36)
MCHC RBC-ENTMCNC: 32 GM/DL — SIGNIFICANT CHANGE UP (ref 32–36)
MCHC RBC-ENTMCNC: 32 GM/DL — SIGNIFICANT CHANGE UP (ref 32–36)
MCV RBC AUTO: 90.1 FL — SIGNIFICANT CHANGE UP (ref 80–100)
MCV RBC AUTO: 90.4 FL — SIGNIFICANT CHANGE UP (ref 80–100)
MCV RBC AUTO: 90.7 FL — SIGNIFICANT CHANGE UP (ref 80–100)
METHGB MFR BLDMV: 0 % — SIGNIFICANT CHANGE UP
METHGB MFR BLDMV: 0.5 % — SIGNIFICANT CHANGE UP
METHGB MFR BLDMV: 0.8 % — SIGNIFICANT CHANGE UP
MONOCYTES # BLD AUTO: 0.64 K/UL — SIGNIFICANT CHANGE UP (ref 0–0.9)
MONOCYTES NFR BLD AUTO: 8 % — SIGNIFICANT CHANGE UP (ref 2–14)
NEUTROPHILS # BLD AUTO: 5.25 K/UL — SIGNIFICANT CHANGE UP (ref 1.8–7.4)
NEUTROPHILS NFR BLD AUTO: 65.1 % — SIGNIFICANT CHANGE UP (ref 43–77)
NRBC # BLD: 0 /100 WBCS — SIGNIFICANT CHANGE UP (ref 0–0)
O2 CT VFR BLD CALC: 30 MMHG — SIGNIFICANT CHANGE UP
O2 CT VFR BLD CALC: 33 MMHG — SIGNIFICANT CHANGE UP
O2 CT VFR BLD CALC: <29 MMHG — SIGNIFICANT CHANGE UP
OXYHGB MFR BLDMV: 43 % — SIGNIFICANT CHANGE UP
OXYHGB MFR BLDMV: 50 % — SIGNIFICANT CHANGE UP
OXYHGB MFR BLDMV: 56.1 % — SIGNIFICANT CHANGE UP
PCO2 BLDA: 35 MMHG — SIGNIFICANT CHANGE UP (ref 32–35)
PCO2 BLDA: 36 MMHG — HIGH (ref 32–35)
PCO2 BLDA: 36 MMHG — HIGH (ref 32–35)
PCO2 BLDMV: 44 MMHG — SIGNIFICANT CHANGE UP
PCO2 BLDMV: 44 MMHG — SIGNIFICANT CHANGE UP
PCO2 BLDMV: 45 MMHG — SIGNIFICANT CHANGE UP
PH BLDA: 7.53 — HIGH (ref 7.35–7.45)
PH BLDA: 7.53 — HIGH (ref 7.35–7.45)
PH BLDA: 7.55 — HIGH (ref 7.35–7.45)
PH BLDMV: 7.44 — SIGNIFICANT CHANGE UP
PH BLDMV: 7.45 — SIGNIFICANT CHANGE UP
PH BLDMV: 7.46 — SIGNIFICANT CHANGE UP
PHOSPHATE SERPL-MCNC: 2.6 MG/DL — SIGNIFICANT CHANGE UP (ref 2.5–4.5)
PLATELET # BLD AUTO: 268 K/UL — SIGNIFICANT CHANGE UP (ref 150–400)
PLATELET # BLD AUTO: 286 K/UL — SIGNIFICANT CHANGE UP (ref 150–400)
PLATELET # BLD AUTO: 292 K/UL — SIGNIFICANT CHANGE UP (ref 150–400)
PO2 BLDA: 111 MMHG — HIGH (ref 83–108)
PO2 BLDA: 84 MMHG — SIGNIFICANT CHANGE UP (ref 83–108)
PO2 BLDA: 92 MMHG — SIGNIFICANT CHANGE UP (ref 83–108)
POTASSIUM SERPL-MCNC: 3.5 MMOL/L — SIGNIFICANT CHANGE UP (ref 3.5–5.3)
POTASSIUM SERPL-MCNC: 3.8 MMOL/L — SIGNIFICANT CHANGE UP (ref 3.5–5.3)
POTASSIUM SERPL-MCNC: 3.9 MMOL/L — SIGNIFICANT CHANGE UP (ref 3.5–5.3)
POTASSIUM SERPL-SCNC: 3.5 MMOL/L — SIGNIFICANT CHANGE UP (ref 3.5–5.3)
POTASSIUM SERPL-SCNC: 3.8 MMOL/L — SIGNIFICANT CHANGE UP (ref 3.5–5.3)
POTASSIUM SERPL-SCNC: 3.9 MMOL/L — SIGNIFICANT CHANGE UP (ref 3.5–5.3)
PROT SERPL-MCNC: 6.2 G/DL — SIGNIFICANT CHANGE UP (ref 6–8.3)
PROT SERPL-MCNC: 6.5 G/DL — SIGNIFICANT CHANGE UP (ref 6–8.3)
RBC # BLD: 3.04 M/UL — LOW (ref 3.8–5.2)
RBC # BLD: 3.11 M/UL — LOW (ref 3.8–5.2)
RBC # BLD: 3.13 M/UL — LOW (ref 3.8–5.2)
RBC # FLD: 16.3 % — HIGH (ref 10.3–14.5)
RBC # FLD: 16.6 % — HIGH (ref 10.3–14.5)
RBC # FLD: 16.8 % — HIGH (ref 10.3–14.5)
RHEUMATOID FACT SERPL-ACNC: <10 IU/ML — SIGNIFICANT CHANGE UP (ref 0–13)
SAO2 % BLDA: 97.8 % — SIGNIFICANT CHANGE UP (ref 94–98)
SAO2 % BLDA: 98.3 % — HIGH (ref 94–98)
SAO2 % BLDA: 98.7 % — HIGH (ref 94–98)
SAO2 % BLDMV: 44 % — SIGNIFICANT CHANGE UP
SAO2 % BLDMV: 50 % — SIGNIFICANT CHANGE UP
SAO2 % BLDMV: 57.4 % — SIGNIFICANT CHANGE UP
SODIUM SERPL-SCNC: 129 MMOL/L — LOW (ref 135–145)
SODIUM SERPL-SCNC: 130 MMOL/L — LOW (ref 135–145)
SODIUM SERPL-SCNC: 131 MMOL/L — LOW (ref 135–145)
TROPONIN T SERPL-MCNC: 1.61 NG/ML — CRITICAL HIGH (ref 0–0.01)
WBC # BLD: 10.92 K/UL — HIGH (ref 3.8–10.5)
WBC # BLD: 8.05 K/UL — SIGNIFICANT CHANGE UP (ref 3.8–10.5)
WBC # BLD: 9.26 K/UL — SIGNIFICANT CHANGE UP (ref 3.8–10.5)
WBC # FLD AUTO: 10.92 K/UL — HIGH (ref 3.8–10.5)
WBC # FLD AUTO: 8.05 K/UL — SIGNIFICANT CHANGE UP (ref 3.8–10.5)
WBC # FLD AUTO: 9.26 K/UL — SIGNIFICANT CHANGE UP (ref 3.8–10.5)

## 2022-01-17 PROCEDURE — 71045 X-RAY EXAM CHEST 1 VIEW: CPT | Mod: 26

## 2022-01-17 PROCEDURE — 99292 CRITICAL CARE ADDL 30 MIN: CPT

## 2022-01-17 PROCEDURE — 99291 CRITICAL CARE FIRST HOUR: CPT

## 2022-01-17 RX ORDER — HEPARIN SODIUM 5000 [USP'U]/ML
1700 INJECTION INTRAVENOUS; SUBCUTANEOUS
Qty: 25000 | Refills: 0 | Status: DISCONTINUED | OUTPATIENT
Start: 2022-01-17 | End: 2022-01-18

## 2022-01-17 RX ORDER — POTASSIUM CHLORIDE 20 MEQ
20 PACKET (EA) ORAL ONCE
Refills: 0 | Status: DISCONTINUED | OUTPATIENT
Start: 2022-01-17 | End: 2022-01-17

## 2022-01-17 RX ORDER — POTASSIUM CHLORIDE 20 MEQ
20 PACKET (EA) ORAL ONCE
Refills: 0 | Status: COMPLETED | OUTPATIENT
Start: 2022-01-17 | End: 2022-01-17

## 2022-01-17 RX ORDER — LANOLIN ALCOHOL/MO/W.PET/CERES
5 CREAM (GRAM) TOPICAL ONCE
Refills: 0 | Status: COMPLETED | OUTPATIENT
Start: 2022-01-17 | End: 2022-01-17

## 2022-01-17 RX ORDER — POTASSIUM CHLORIDE 20 MEQ
40 PACKET (EA) ORAL ONCE
Refills: 0 | Status: COMPLETED | OUTPATIENT
Start: 2022-01-17 | End: 2022-01-17

## 2022-01-17 RX ADMIN — SODIUM NITROPRUSSIDE 22.2 MICROGRAM(S)/KG/MIN: 50 INJECTION INTRAVENOUS at 12:43

## 2022-01-17 RX ADMIN — HEPARIN SODIUM 19 UNIT(S)/HR: 5000 INJECTION INTRAVENOUS; SUBCUTANEOUS at 15:44

## 2022-01-17 RX ADMIN — Medication 0.6 MILLIGRAM(S): at 11:53

## 2022-01-17 RX ADMIN — HEPARIN SODIUM 19 UNIT(S)/HR: 5000 INJECTION INTRAVENOUS; SUBCUTANEOUS at 23:54

## 2022-01-17 RX ADMIN — SPIRONOLACTONE 25 MILLIGRAM(S): 25 TABLET, FILM COATED ORAL at 06:35

## 2022-01-17 RX ADMIN — Medication 40 MILLIEQUIVALENT(S): at 06:39

## 2022-01-17 RX ADMIN — Medication 5 MILLIGRAM(S): at 22:00

## 2022-01-17 RX ADMIN — SODIUM NITROPRUSSIDE 22.2 MICROGRAM(S)/KG/MIN: 50 INJECTION INTRAVENOUS at 01:09

## 2022-01-17 RX ADMIN — PANTOPRAZOLE SODIUM 40 MILLIGRAM(S): 20 TABLET, DELAYED RELEASE ORAL at 06:35

## 2022-01-17 RX ADMIN — Medication 0.6 MILLIGRAM(S): at 21:59

## 2022-01-17 RX ADMIN — Medication 3.75 MG/HR: at 07:12

## 2022-01-17 RX ADMIN — CHLORHEXIDINE GLUCONATE 1 APPLICATION(S): 213 SOLUTION TOPICAL at 06:35

## 2022-01-17 RX ADMIN — Medication 50 MILLIEQUIVALENT(S): at 23:13

## 2022-01-17 RX ADMIN — HEPARIN SODIUM 17 UNIT(S)/HR: 5000 INJECTION INTRAVENOUS; SUBCUTANEOUS at 10:46

## 2022-01-17 RX ADMIN — SODIUM NITROPRUSSIDE 22.2 MICROGRAM(S)/KG/MIN: 50 INJECTION INTRAVENOUS at 23:55

## 2022-01-17 NOTE — DIETITIAN INITIAL EVALUATION ADULT. - PERSON TAUGHT/METHOD
Low sodium / tips for GI distress provided./verbal instruction/patient instructed/teach back - (Patient repeats in own words)

## 2022-01-17 NOTE — DIETITIAN INITIAL EVALUATION ADULT. - ADD RECOMMEND
1. Monitor PO intake/appetite, GI distress (consider adding probiotic vs banatrol Should diarrhea persist), diet tolerance, labs, weights.  2. Honor pt food preferences as able.  3. RD to remain available for additional nutrition interventions as needed.

## 2022-01-17 NOTE — PROGRESS NOTE ADULT - SUBJECTIVE AND OBJECTIVE BOX
OVERNIGHT EVENTS: 10pm Krishna CO 4.7, CI 2.3, SVR 1021. 6am Krishna CO 4.6, CI 2.2, SVR 1130. 6am thermodilution CO 3.7, CI 1.9, SVR 1188.     SUBJECTIVE / INTERVAL HPI: Patient seen and examined at bedside. Reports she is feeling better and breathing well. Denies any chest pain or SOB, tolerating HFNC well. Denies nausea, says she has been eating a little bit and her appetite is slowly returning. Endorses continued dry cough. Reports 5 episodes of diarrhea yesterday after eating. Started on banana flakes. Patient denying chest pain, SOB, palpitations. Patient denies fever, chills, HA, Dizziness, N/V, abdominal pain, hematochezia/melena, dysuria, hematuria, new onset weakness/numbness, LE pain and/or swelling.  Remaining ROS negative       PHYSICAL EXAM:    General: resting comfortably in bed in NAD  HEENT: NC/AT; PERRL, anicteric sclera; MMM  Neck: supple, no JVD. swan in right IJ.  Cardiovascular: +S1/S2, tachycardic rate, regular rhythm, no murmurs   Respiratory: CTA B/L; no W/R/R, decreased breath sounds on right side. on HFNC. no increased WOB.   Gastrointestinal: soft, NT/ND; +BSx4  Extremities: WWP; no edema, clubbing or cyanosis. right femoral A line for IABP. Right radial A line.   Vascular: 2+ radial, DP pulses B/L  Neurological: AAOx3; no focal deficits  Dermatologic: no appreciable wounds or damage to the skin      VITAL SIGNS:  Vital Signs Last 24 Hrs  T(C): 37.1 (17 Jan 2022 10:00), Max: 37.2 (16 Jan 2022 22:00)  T(F): 98.8 (17 Jan 2022 10:00), Max: 99 (16 Jan 2022 22:00)  HR: 106 (17 Jan 2022 13:00) (92 - 112)  BP: 134/98 (17 Jan 2022 13:00) (134/98 - 134/98)  BP(mean): 113 (17 Jan 2022 13:00) (113 - 113)  RR: 18 (17 Jan 2022 13:00) (17 - 24)  SpO2: 93% (17 Jan 2022 13:00) (92% - 100%)      MEDICATIONS:  MEDICATIONS  (STANDING):  chlorhexidine 2% Cloths 1 Application(s) Topical <User Schedule>  colchicine 0.6 milliGRAM(s) Oral every 12 hours  dextrose 40% Gel 15 Gram(s) Oral once  dextrose 5%. 1000 milliLiter(s) (50 mL/Hr) IV Continuous <Continuous>  dextrose 5%. 1000 milliLiter(s) (100 mL/Hr) IV Continuous <Continuous>  dextrose 50% Injectable 25 Gram(s) IV Push once  dextrose 50% Injectable 12.5 Gram(s) IV Push once  dextrose 50% Injectable 25 Gram(s) IV Push once  furosemide Infusion 7.5 mG/Hr (3.75 mL/Hr) IV Continuous <Continuous>  glucagon  Injectable 1 milliGRAM(s) IntraMuscular once  heparin  Infusion 1700 Unit(s)/Hr (17 mL/Hr) IV Continuous <Continuous>  influenza   Vaccine 0.5 milliLiter(s) IntraMuscular once  nitroprusside Infusion 1.5 MICROgram(s)/kG/Min (22.2 mL/Hr) IV Continuous <Continuous>  pantoprazole    Tablet 40 milliGRAM(s) Oral before breakfast  spironolactone 25 milliGRAM(s) Oral every 24 hours    MEDICATIONS  (PRN):  acetaminophen     Tablet .. 650 milliGRAM(s) Oral every 6 hours PRN Mild Pain (1 - 3)  aluminum hydroxide/magnesium hydroxide/simethicone Suspension 30 milliLiter(s) Oral every 4 hours PRN Dyspepsia      ALLERGIES:  Allergies    No Known Allergies    Intolerances        LABS:                        8.6    8.05  )-----------( 286      ( 17 Jan 2022 05:54 )             27.4     01-17    129<L>  |  91<L>  |  8   ----------------------------<  101<H>  3.5   |  27  |  0.68    Ca    8.6      17 Jan 2022 05:54  Phos  2.6     01-17  Mg     2.3     01-17    TPro  6.2  /  Alb  3.2<L>  /  TBili  0.4  /  DBili  x   /  AST  38  /  ALT  108<H>  /  AlkPhos  169<H>  01-17    PTT - ( 16 Jan 2022 10:31 )  PTT:84.3 sec    CAPILLARY BLOOD GLUCOSE          RADIOLOGY & ADDITIONAL TESTS: Reviewed.

## 2022-01-17 NOTE — DIETITIAN INITIAL EVALUATION ADULT. - OTHER CALCULATIONS
%ZBY=861; IBW used to calculate energy needs due to pt's current body weight exceeding 120% of IBW; adjusted for age/BMI, CHF, Fluids per team

## 2022-01-17 NOTE — DIETITIAN INITIAL EVALUATION ADULT. - OTHER INFO
37 yo F no PMHx admitted to [place] for perimyocarditis and then transferred to [facility] for further work up and management of cardiogenic shock. 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 S/p TTE 1/13 with severely reduced LV systolic function with EF 15% and RWMA; normal RV function, no valve disease. S/p L/R heart cath 1/14 showing clear coronaries on [facility], however RHC with elevated left and right filling pressures. Balloon pump placed.     Pt seen in bed on 5UR, +bedrest order. /98. Reports she has not been eating well d/t ABD discomfort and reported diarrhea s/p eating. Diarrhea began 1/16. Pt ordered for ONS d/t decreased PO intake however reports consuming ~50% ensure 1/16 which worsened diarrhea - common affect of ensure. Eating well PTA; consumes fresh food, no canned foods. NKFA. No issues chewing/swallowing. Reports +wt gain ~10 pounds d/t fluid build up; 1+ generalized edema at this time. No pressure ulcers at this time. Thanh 16.   Please see RD Recs below. Pending order placed.

## 2022-01-17 NOTE — PROGRESS NOTE ADULT - ATTENDING COMMENTS
36F, obese, no significant PMH w/ recent [place] ER visit ~2weeks for PNA, tested positive for Covid, sent home on oral abx. Subsequently presents back to [place] ER on 1/9 w/ nausea, abdominal discomfort, SOB -> found to have acute systolic CHF 2/2 Myocarditis c/b cardiogenic shock, tx'd to [facility] CCU for further mgmt    -sCHF - acute systolic CHF 2/2 Myocarditis(recent Covid infection 2wks ago, c/b cardiogenic shock s/p L/RHC - no obstructive CAD; RHC w/ severely elevated R/L filling pressures and low cardiac indices c/w cardiogenic shock - s/p IABP placement c/w 1:1 - will change to 1:2 today and re-assess hemodynamics;, c/w Nipride gtt at 1.5mcg/kg/min, diuresing very c/w Lasix gtt @ 7.5mg/hr, c/w Spironolactone 25mg daily - CVP 7, PCWP~18-19; Repeat hemodynamics this AM stable - CO/CI 4.6/2.2 SVR 1138. Lactate normal, remains warm on exam, normal renal function, good UOP and normal LFTS; Transition HFNC to NC, saturating %, no resp distress. Advanced HF following  -DASH diet  -DVT PPx  -Dispo: CCU  -Full Code    Sebastián Humphries MD  CCU Attending

## 2022-01-18 DIAGNOSIS — R57.0 CARDIOGENIC SHOCK: ICD-10-CM

## 2022-01-18 LAB
ALBUMIN SERPL ELPH-MCNC: 3.3 G/DL — SIGNIFICANT CHANGE UP (ref 3.3–5)
ALBUMIN SERPL ELPH-MCNC: 3.4 G/DL — SIGNIFICANT CHANGE UP (ref 3.3–5)
ALBUMIN SERPL ELPH-MCNC: 3.8 G/DL — SIGNIFICANT CHANGE UP (ref 3.3–5)
ALP SERPL-CCNC: 167 U/L — HIGH (ref 40–120)
ALP SERPL-CCNC: 173 U/L — HIGH (ref 40–120)
ALP SERPL-CCNC: 178 U/L — HIGH (ref 40–120)
ALT FLD-CCNC: 104 U/L — HIGH (ref 10–45)
ALT FLD-CCNC: 93 U/L — HIGH (ref 10–45)
ALT FLD-CCNC: 93 U/L — HIGH (ref 10–45)
ANION GAP SERPL CALC-SCNC: 11 MMOL/L — SIGNIFICANT CHANGE UP (ref 5–17)
ANION GAP SERPL CALC-SCNC: 13 MMOL/L — SIGNIFICANT CHANGE UP (ref 5–17)
ANION GAP SERPL CALC-SCNC: 15 MMOL/L — SIGNIFICANT CHANGE UP (ref 5–17)
APTT BLD: 153.1 SEC — CRITICAL HIGH (ref 27.5–35.5)
APTT BLD: 29.3 SEC — SIGNIFICANT CHANGE UP (ref 27.5–35.5)
APTT BLD: 56.3 SEC — HIGH (ref 27.5–35.5)
APTT BLD: 70.8 SEC — HIGH (ref 27.5–35.5)
APTT BLD: 98.2 SEC — HIGH (ref 27.5–35.5)
AST SERPL-CCNC: 38 U/L — SIGNIFICANT CHANGE UP (ref 10–40)
AST SERPL-CCNC: 41 U/L — HIGH (ref 10–40)
AST SERPL-CCNC: 46 U/L — HIGH (ref 10–40)
BASE EXCESS BLDA CALC-SCNC: 5.8 MMOL/L — HIGH (ref -2–3)
BASE EXCESS BLDA CALC-SCNC: 7.4 MMOL/L — HIGH (ref -2–3)
BASE EXCESS BLDMV CALC-SCNC: 5.7 MMOL/L — SIGNIFICANT CHANGE UP
BASE EXCESS BLDMV CALC-SCNC: 6.9 MMOL/L — SIGNIFICANT CHANGE UP
BASE EXCESS BLDMV CALC-SCNC: 9 MMOL/L — SIGNIFICANT CHANGE UP
BASOPHILS # BLD AUTO: 0.04 K/UL — SIGNIFICANT CHANGE UP (ref 0–0.2)
BASOPHILS NFR BLD AUTO: 0.4 % — SIGNIFICANT CHANGE UP (ref 0–2)
BILIRUB SERPL-MCNC: 0.4 MG/DL — SIGNIFICANT CHANGE UP (ref 0.2–1.2)
BILIRUB SERPL-MCNC: 0.4 MG/DL — SIGNIFICANT CHANGE UP (ref 0.2–1.2)
BILIRUB SERPL-MCNC: 0.5 MG/DL — SIGNIFICANT CHANGE UP (ref 0.2–1.2)
BLD GP AB SCN SERPL QL: NEGATIVE — SIGNIFICANT CHANGE UP
BUN SERPL-MCNC: 10 MG/DL — SIGNIFICANT CHANGE UP (ref 7–23)
BUN SERPL-MCNC: 10 MG/DL — SIGNIFICANT CHANGE UP (ref 7–23)
BUN SERPL-MCNC: 9 MG/DL — SIGNIFICANT CHANGE UP (ref 7–23)
CALCIUM SERPL-MCNC: 8.8 MG/DL — SIGNIFICANT CHANGE UP (ref 8.4–10.5)
CALCIUM SERPL-MCNC: 8.9 MG/DL — SIGNIFICANT CHANGE UP (ref 8.4–10.5)
CALCIUM SERPL-MCNC: 8.9 MG/DL — SIGNIFICANT CHANGE UP (ref 8.4–10.5)
CHLORIDE SERPL-SCNC: 90 MMOL/L — LOW (ref 96–108)
CHLORIDE SERPL-SCNC: 93 MMOL/L — LOW (ref 96–108)
CHLORIDE SERPL-SCNC: 94 MMOL/L — LOW (ref 96–108)
CK MB BLD-MCNC: HIGH TITER
CK MB CFR SERPL CALC: 2.5 NG/ML — SIGNIFICANT CHANGE UP (ref 0–6.7)
CK SERPL-CCNC: 34 U/L — SIGNIFICANT CHANGE UP (ref 25–170)
CO2 BLDA-SCNC: 31 MMOL/L — HIGH (ref 19–24)
CO2 BLDA-SCNC: 32 MMOL/L — HIGH (ref 19–24)
CO2 BLDMV-SCNC: 32.6 MMOL/L — SIGNIFICANT CHANGE UP
CO2 BLDMV-SCNC: 34.9 MMOL/L — SIGNIFICANT CHANGE UP
CO2 BLDMV-SCNC: 45 MMOL/L — SIGNIFICANT CHANGE UP
CO2 SERPL-SCNC: 25 MMOL/L — SIGNIFICANT CHANGE UP (ref 22–31)
CO2 SERPL-SCNC: 26 MMOL/L — SIGNIFICANT CHANGE UP (ref 22–31)
CO2 SERPL-SCNC: 27 MMOL/L — SIGNIFICANT CHANGE UP (ref 22–31)
COHGB MFR BLDMV: 0.9 % — SIGNIFICANT CHANGE UP
COHGB MFR BLDMV: 1.1 % — SIGNIFICANT CHANGE UP
COHGB MFR BLDMV: 1.8 % — SIGNIFICANT CHANGE UP
COXSACKIE TYPE A-24: HIGH TITER
CREAT SERPL-MCNC: 0.69 MG/DL — SIGNIFICANT CHANGE UP (ref 0.5–1.3)
CREAT SERPL-MCNC: 0.75 MG/DL — SIGNIFICANT CHANGE UP (ref 0.5–1.3)
CREAT SERPL-MCNC: 0.76 MG/DL — SIGNIFICANT CHANGE UP (ref 0.5–1.3)
CV A24 IGG TITR SER IF: HIGH TITER
CV A7 AB SER-ACNC: HIGH TITER
CV A9 AB TITR FLD: HIGH TITER
DSDNA AB SER-ACNC: <12 IU/ML — SIGNIFICANT CHANGE UP
EOSINOPHIL # BLD AUTO: 0.3 K/UL — SIGNIFICANT CHANGE UP (ref 0–0.5)
EOSINOPHIL NFR BLD AUTO: 2.8 % — SIGNIFICANT CHANGE UP (ref 0–6)
GAS PNL BLDMV: SIGNIFICANT CHANGE UP
GLUCOSE SERPL-MCNC: 103 MG/DL — HIGH (ref 70–99)
GLUCOSE SERPL-MCNC: 109 MG/DL — HIGH (ref 70–99)
GLUCOSE SERPL-MCNC: 132 MG/DL — HIGH (ref 70–99)
HCO3 BLDA-SCNC: 30 MMOL/L — HIGH (ref 21–28)
HCO3 BLDA-SCNC: 31 MMOL/L — HIGH (ref 21–28)
HCO3 BLDMV-SCNC: 31 MMOL/L — SIGNIFICANT CHANGE UP
HCO3 BLDMV-SCNC: 31 MMOL/L — SIGNIFICANT CHANGE UP
HCO3 BLDMV-SCNC: 34 MMOL/L — SIGNIFICANT CHANGE UP
HCT VFR BLD CALC: 29.7 % — LOW (ref 34.5–45)
HCT VFR BLD CALC: 30 % — LOW (ref 34.5–45)
HGB BLD-MCNC: 9.1 G/DL — LOW (ref 11.5–15.5)
HGB BLD-MCNC: 9.5 G/DL — LOW (ref 11.5–15.5)
HGB FLD-MCNC: 11.2 G/DL — LOW (ref 11.7–16.1)
HGB FLD-MCNC: 9.5 G/DL — LOW (ref 11.7–16.1)
HGB FLD-MCNC: 9.7 G/DL — LOW (ref 11.7–16.1)
IMM GRANULOCYTES NFR BLD AUTO: 1.2 % — SIGNIFICANT CHANGE UP (ref 0–1.5)
LACTATE SERPL-SCNC: 0.7 MMOL/L — SIGNIFICANT CHANGE UP (ref 0.5–2)
LACTATE SERPL-SCNC: 1.9 MMOL/L — SIGNIFICANT CHANGE UP (ref 0.5–2)
LACTATE SERPL-SCNC: 2.4 MMOL/L — HIGH (ref 0.5–2)
LYMPHOCYTES # BLD AUTO: 2.29 K/UL — SIGNIFICANT CHANGE UP (ref 1–3.3)
LYMPHOCYTES # BLD AUTO: 21.7 % — SIGNIFICANT CHANGE UP (ref 13–44)
MAGNESIUM SERPL-MCNC: 2.4 MG/DL — SIGNIFICANT CHANGE UP (ref 1.6–2.6)
MCHC RBC-ENTMCNC: 28 PG — SIGNIFICANT CHANGE UP (ref 27–34)
MCHC RBC-ENTMCNC: 28.8 PG — SIGNIFICANT CHANGE UP (ref 27–34)
MCHC RBC-ENTMCNC: 30.6 GM/DL — LOW (ref 32–36)
MCHC RBC-ENTMCNC: 31.7 GM/DL — LOW (ref 32–36)
MCV RBC AUTO: 90.9 FL — SIGNIFICANT CHANGE UP (ref 80–100)
MCV RBC AUTO: 91.4 FL — SIGNIFICANT CHANGE UP (ref 80–100)
METHGB MFR BLDMV: 0.5 % — SIGNIFICANT CHANGE UP
METHGB MFR BLDMV: 0.5 % — SIGNIFICANT CHANGE UP
METHGB MFR BLDMV: 0.7 % — SIGNIFICANT CHANGE UP
MITOCHONDRIA AB SER-ACNC: SIGNIFICANT CHANGE UP
MONOCYTES # BLD AUTO: 0.7 K/UL — SIGNIFICANT CHANGE UP (ref 0–0.9)
MONOCYTES NFR BLD AUTO: 6.6 % — SIGNIFICANT CHANGE UP (ref 2–14)
NEUTROPHILS # BLD AUTO: 7.1 K/UL — SIGNIFICANT CHANGE UP (ref 1.8–7.4)
NEUTROPHILS NFR BLD AUTO: 67.3 % — SIGNIFICANT CHANGE UP (ref 43–77)
NRBC # BLD: 0 /100 WBCS — SIGNIFICANT CHANGE UP (ref 0–0)
NRBC # BLD: 0 /100 WBCS — SIGNIFICANT CHANGE UP (ref 0–0)
O2 CT VFR BLD CALC: 37 MMHG — SIGNIFICANT CHANGE UP
O2 CT VFR BLD CALC: 39 MMHG — SIGNIFICANT CHANGE UP
O2 CT VFR BLD CALC: 42 MMHG — SIGNIFICANT CHANGE UP
O2 CT VFR BLDMV CALC: 11 ML/DL — SIGNIFICANT CHANGE UP
O2 CT VFR BLDMV CALC: 9 ML/DL — SIGNIFICANT CHANGE UP
OXYHGB MFR BLDMV: 64 % — SIGNIFICANT CHANGE UP
OXYHGB MFR BLDMV: 66.6 % — SIGNIFICANT CHANGE UP
OXYHGB MFR BLDMV: 69 % — SIGNIFICANT CHANGE UP
PCO2 BLDA: 40 MMHG — HIGH (ref 32–35)
PCO2 BLDA: 40 MMHG — HIGH (ref 32–35)
PCO2 BLDMV: 43 MMHG — SIGNIFICANT CHANGE UP
PCO2 BLDMV: 45 MMHG — SIGNIFICANT CHANGE UP
PCO2 BLDMV: 45 MMHG — SIGNIFICANT CHANGE UP
PH BLDA: 7.48 — HIGH (ref 7.35–7.45)
PH BLDA: 7.5 — HIGH (ref 7.35–7.45)
PH BLDMV: 7.44 — SIGNIFICANT CHANGE UP
PH BLDMV: 7.47 — SIGNIFICANT CHANGE UP
PH BLDMV: 7.48 — SIGNIFICANT CHANGE UP
PHOSPHATE SERPL-MCNC: 3.6 MG/DL — SIGNIFICANT CHANGE UP (ref 2.5–4.5)
PLATELET # BLD AUTO: 293 K/UL — SIGNIFICANT CHANGE UP (ref 150–400)
PLATELET # BLD AUTO: 303 K/UL — SIGNIFICANT CHANGE UP (ref 150–400)
PO2 BLDA: 104 MMHG — SIGNIFICANT CHANGE UP (ref 83–108)
PO2 BLDA: 110 MMHG — HIGH (ref 83–108)
POTASSIUM SERPL-MCNC: 3.7 MMOL/L — SIGNIFICANT CHANGE UP (ref 3.5–5.3)
POTASSIUM SERPL-MCNC: 4 MMOL/L — SIGNIFICANT CHANGE UP (ref 3.5–5.3)
POTASSIUM SERPL-MCNC: 4.4 MMOL/L — SIGNIFICANT CHANGE UP (ref 3.5–5.3)
POTASSIUM SERPL-SCNC: 3.7 MMOL/L — SIGNIFICANT CHANGE UP (ref 3.5–5.3)
POTASSIUM SERPL-SCNC: 4 MMOL/L — SIGNIFICANT CHANGE UP (ref 3.5–5.3)
POTASSIUM SERPL-SCNC: 4.4 MMOL/L — SIGNIFICANT CHANGE UP (ref 3.5–5.3)
PROT SERPL-MCNC: 6.7 G/DL — SIGNIFICANT CHANGE UP (ref 6–8.3)
PROT SERPL-MCNC: 6.7 G/DL — SIGNIFICANT CHANGE UP (ref 6–8.3)
PROT SERPL-MCNC: 7.1 G/DL — SIGNIFICANT CHANGE UP (ref 6–8.3)
RBC # BLD: 3.25 M/UL — LOW (ref 3.8–5.2)
RBC # BLD: 3.3 M/UL — LOW (ref 3.8–5.2)
RBC # FLD: 17 % — HIGH (ref 10.3–14.5)
RBC # FLD: 17.2 % — HIGH (ref 10.3–14.5)
RH IG SCN BLD-IMP: POSITIVE — SIGNIFICANT CHANGE UP
SAO2 % BLDA: 98 % — SIGNIFICANT CHANGE UP (ref 94–98)
SAO2 % BLDA: 98.7 % — HIGH (ref 94–98)
SAO2 % BLDMV: 65.1 % — SIGNIFICANT CHANGE UP
SAO2 % BLDMV: 67.6 % — SIGNIFICANT CHANGE UP
SAO2 % BLDMV: 71 % — SIGNIFICANT CHANGE UP
SODIUM SERPL-SCNC: 130 MMOL/L — LOW (ref 135–145)
SODIUM SERPL-SCNC: 131 MMOL/L — LOW (ref 135–145)
SODIUM SERPL-SCNC: 133 MMOL/L — LOW (ref 135–145)
TROPONIN T SERPL-MCNC: 0.93 NG/ML — CRITICAL HIGH (ref 0–0.01)
WBC # BLD: 10.08 K/UL — SIGNIFICANT CHANGE UP (ref 3.8–10.5)
WBC # BLD: 10.56 K/UL — HIGH (ref 3.8–10.5)
WBC # FLD AUTO: 10.08 K/UL — SIGNIFICANT CHANGE UP (ref 3.8–10.5)
WBC # FLD AUTO: 10.56 K/UL — HIGH (ref 3.8–10.5)

## 2022-01-18 PROCEDURE — 93970 EXTREMITY STUDY: CPT | Mod: 26

## 2022-01-18 PROCEDURE — 71045 X-RAY EXAM CHEST 1 VIEW: CPT | Mod: 26

## 2022-01-18 PROCEDURE — 76700 US EXAM ABDOM COMPLETE: CPT | Mod: 26

## 2022-01-18 PROCEDURE — 99291 CRITICAL CARE FIRST HOUR: CPT

## 2022-01-18 PROCEDURE — 99292 CRITICAL CARE ADDL 30 MIN: CPT

## 2022-01-18 RX ORDER — FENTANYL CITRATE 50 UG/ML
25 INJECTION INTRAVENOUS ONCE
Refills: 0 | Status: DISCONTINUED | OUTPATIENT
Start: 2022-01-18 | End: 2022-01-18

## 2022-01-18 RX ORDER — HYDROMORPHONE HYDROCHLORIDE 2 MG/ML
0.5 INJECTION INTRAMUSCULAR; INTRAVENOUS; SUBCUTANEOUS ONCE
Refills: 0 | Status: DISCONTINUED | OUTPATIENT
Start: 2022-01-18 | End: 2022-01-18

## 2022-01-18 RX ORDER — LANOLIN ALCOHOL/MO/W.PET/CERES
5 CREAM (GRAM) TOPICAL ONCE
Refills: 0 | Status: COMPLETED | OUTPATIENT
Start: 2022-01-18 | End: 2022-01-18

## 2022-01-18 RX ORDER — MILRINONE LACTATE 1 MG/ML
0.25 INJECTION, SOLUTION INTRAVENOUS
Qty: 20 | Refills: 0 | Status: DISCONTINUED | OUTPATIENT
Start: 2022-01-18 | End: 2022-01-20

## 2022-01-18 RX ORDER — POTASSIUM CHLORIDE 20 MEQ
20 PACKET (EA) ORAL
Refills: 0 | Status: COMPLETED | OUTPATIENT
Start: 2022-01-18 | End: 2022-01-18

## 2022-01-18 RX ORDER — POTASSIUM CHLORIDE 20 MEQ
40 PACKET (EA) ORAL ONCE
Refills: 0 | Status: DISCONTINUED | OUTPATIENT
Start: 2022-01-18 | End: 2022-01-18

## 2022-01-18 RX ORDER — HEPARIN SODIUM 5000 [USP'U]/ML
1600 INJECTION INTRAVENOUS; SUBCUTANEOUS
Qty: 25000 | Refills: 0 | Status: DISCONTINUED | OUTPATIENT
Start: 2022-01-18 | End: 2022-01-18

## 2022-01-18 RX ORDER — VALSARTAN 80 MG/1
20 TABLET ORAL EVERY 12 HOURS
Refills: 0 | Status: DISCONTINUED | OUTPATIENT
Start: 2022-01-18 | End: 2022-01-22

## 2022-01-18 RX ADMIN — FENTANYL CITRATE 25 MICROGRAM(S): 50 INJECTION INTRAVENOUS at 17:00

## 2022-01-18 RX ADMIN — SODIUM NITROPRUSSIDE 22.2 MICROGRAM(S)/KG/MIN: 50 INJECTION INTRAVENOUS at 23:14

## 2022-01-18 RX ADMIN — HEPARIN SODIUM 16 UNIT(S)/HR: 5000 INJECTION INTRAVENOUS; SUBCUTANEOUS at 13:30

## 2022-01-18 RX ADMIN — FENTANYL CITRATE 25 MICROGRAM(S): 50 INJECTION INTRAVENOUS at 17:15

## 2022-01-18 RX ADMIN — FENTANYL CITRATE 25 MICROGRAM(S): 50 INJECTION INTRAVENOUS at 17:25

## 2022-01-18 RX ADMIN — FENTANYL CITRATE 25 MICROGRAM(S): 50 INJECTION INTRAVENOUS at 17:10

## 2022-01-18 RX ADMIN — SODIUM NITROPRUSSIDE 22.2 MICROGRAM(S)/KG/MIN: 50 INJECTION INTRAVENOUS at 11:49

## 2022-01-18 RX ADMIN — CHLORHEXIDINE GLUCONATE 1 APPLICATION(S): 213 SOLUTION TOPICAL at 06:28

## 2022-01-18 RX ADMIN — Medication 50 MILLIEQUIVALENT(S): at 11:12

## 2022-01-18 RX ADMIN — VALSARTAN 20 MILLIGRAM(S): 80 TABLET ORAL at 12:36

## 2022-01-18 RX ADMIN — Medication 50 MILLIEQUIVALENT(S): at 07:04

## 2022-01-18 RX ADMIN — SPIRONOLACTONE 25 MILLIGRAM(S): 25 TABLET, FILM COATED ORAL at 06:27

## 2022-01-18 RX ADMIN — Medication 5 MILLIGRAM(S): at 21:36

## 2022-01-18 RX ADMIN — FENTANYL CITRATE 25 MICROGRAM(S): 50 INJECTION INTRAVENOUS at 16:55

## 2022-01-18 RX ADMIN — MILRINONE LACTATE 7.4 MICROGRAM(S)/KG/MIN: 1 INJECTION, SOLUTION INTRAVENOUS at 21:48

## 2022-01-18 RX ADMIN — Medication 3.75 MG/HR: at 06:26

## 2022-01-18 RX ADMIN — MILRINONE LACTATE 7.4 MICROGRAM(S)/KG/MIN: 1 INJECTION, SOLUTION INTRAVENOUS at 09:50

## 2022-01-18 RX ADMIN — PANTOPRAZOLE SODIUM 40 MILLIGRAM(S): 20 TABLET, DELAYED RELEASE ORAL at 06:27

## 2022-01-18 RX ADMIN — HYDROMORPHONE HYDROCHLORIDE 0.5 MILLIGRAM(S): 2 INJECTION INTRAMUSCULAR; INTRAVENOUS; SUBCUTANEOUS at 17:37

## 2022-01-18 RX ADMIN — Medication 0.6 MILLIGRAM(S): at 09:50

## 2022-01-18 RX ADMIN — HYDROMORPHONE HYDROCHLORIDE 0.5 MILLIGRAM(S): 2 INJECTION INTRAMUSCULAR; INTRAVENOUS; SUBCUTANEOUS at 17:22

## 2022-01-18 RX ADMIN — Medication 0.6 MILLIGRAM(S): at 21:36

## 2022-01-18 NOTE — PROGRESS NOTE ADULT - ASSESSMENT
Ms Harrell is a 37 yo F w/ no PMHx admitted to [facility] for perimyocarditis and then transferred to [facility] for further work up and management of cardiogenic shock.    Neuro  AAOx3, No active issues    Cardiac  #Cardiogenic shock 2/2 perimyocarditis  Echo at [facility] (1/10/22) revealed EF 30-35% with pericardial effusion  Lactate now cleared. Pro-BNP 08847. Elevated LFTs. 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 balloon pump placed  -right IJ with indwelling [place] cath for therapeutic monitoring  -HF following , recs appreciated   -plan to wean balloon pump, turned to 1:3 this morning  -start milrinone drip 0.25 mcg/kg/min as bridge to wean balloon pump ----- will continue to monitor hemodynamics to guide management however low threshold to keep pump in for now. if cardiac output preserved on 1:3 will pull pump  -c/w heparin drip while balloon pump in  -tachycardia much improved today  -c/w lasix drip for diuresis; monitor BPs and I&O's; goal negative 1-2 L daily and goal CVP 6-8  -c/w spironolactone 25mg daily   -c/w Nipride gtt for afterload reduction (Goal MAP >65)   -start valsartan 20mg BID; if pressures drop too much will d/c nipride drip. plan to eventually transition to PO afterload reduction and wean off nipride drip  -holding beta blocker for now given hypotension with reflex tachy  -strict I/Os  -cardiac enzymes downtrended, no need to continue to trend  -c/w HFNC 40/40 at night and bed tilting since desaturation lying flat. can place on nasal cannula during the day for patient comfort   -plan for cardiac MRI this week when patient medically optimized to tolerate   -plan to repeat TTE tomorrow to assess for improvement in LVEF    #Perimyocarditis   Patient presenting with pleuritic chest pain with associated myalgias, chills, cough, fever, and generalized fatigue  , CKMB 84.8, Trop T 5.83 on admission; continue to trend daily. 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 for recent infection   -CMV and EBV Abs indicating past infections   -f/u MILAN, scleroderma, rheumatoid factor, IgG subsets, anti-mitochondrial ab, anti-dsDNA ab  -Urine drug screen negative  -TSH wnl  -c/w colchicine 0.6mg BID  -plan for cardiac MRI once patient medically stable     Pulm  -no active issues    Renal  #Hyponatremia  Patient persistently hyponatremic since admission; Na improving to 131 today  -patient asymptomatic ; likely hypervolemic hyponatremia 2/2 to cardiogenic shock  -Continue to monitor BMP  -c/w lasix drip      GI  #Transaminitis   Patient with ALP/ALT remaining elevated; likely 2/2 congestive hepatopathy versus ischemia i/s/o cardiogenic shock  -abd US from OSH showing enlarged heterogenous liver   -continue to monitor  -hepatitis panel negative  -f/u abdominal ultrasound    Heme-Onc  #Anemia , normocytic   Hbg slowly uptrending. No hx of anemia. Patient was menstruating on admission and endorses menometrorrhagia. No other signs of bleeding.   - iron studies wnl  - trend CBC  - maintain active T&S  - transfuse for Hgb <7     Endocrine  No active issues    ID  No active issues    Other  F: none  E: replete K<4, Mg<2  N: DASH/TLC diet with ensure     VTE Prophylaxis: heparin   GI: Pantoprazole 40mg PO daily  C: Full Code  D: CCU

## 2022-01-18 NOTE — PROGRESS NOTE ADULT - ATTENDING COMMENTS
36F, obese, no significant PMH w/ recent [place] ER visit ~2weeks for PNA, tested positive for Covid, sent home on oral abx. Subsequently presents back to [place] ER on 1/9 w/ nausea, abdominal discomfort, SOB -> found to have acute systolic CHF 2/2 Myocarditis c/b cardiogenic shock, tx'd to [facility] CCU for further mgmt    -sCHF - acute systolic CHF 2/2 Myocarditis(recent Covid infection 2wks ago, c/b cardiogenic shock s/p L/RHC - no obstructive CAD; RHC w/ severely elevated R/L filling pressures and low cardiac indices c/w cardiogenic shock - s/p IABP placement on 1/14; Pt was able to tolerate IABP at 1:2 yesterday for 8-9hrs, slight bump in lactate(2.2), CO/CI 5.2/2.5(7-8pm), placed back on 1:1 overnight; This AM placed IABP assist on 1:3, started Milrinone 0.25mcg, currently on Nipride gtt at 1.5mcg/kg/min, Lasix gtt reduced to 5mg/hr as patient continues to diurese very well, c/w Spironolactone 25mg daily. Plan to repeat Hemodynamics this afternoon and consider discontinuing IABP. Otherwise saturating well on NC %, no resp distress. Advanced HF following, appreciate recs  -DASH diet  -DVT PPx  -Dispo: CCU  -Full Code    Sebastián Humphries MD  CCU Attending

## 2022-01-18 NOTE — PROGRESS NOTE ADULT - SUBJECTIVE AND OBJECTIVE BOX
INTERVAL HPI/OVERNIGHT EVENTS: Patient seen and examined at bedside.     Home Medications:  Multiple Vitamins oral tablet: 1 tab(s) orally once a day (13 Jan 2022 23:04)      VITAL SIGNS:  T(F): 98.8 (01-18-22 @ 10:00)  HR: 112 (01-18-22 @ 10:00)  BP: 96/59 (01-18-22 @ 10:00)  RR: 20 (01-18-22 @ 10:00)  SpO2: 96% (01-18-22 @ 10:00)  Wt(kg): --    01-17-22 @ 07:01  -  01-18-22 @ 07:00  --------------------------------------------------------  IN: 1781.7 mL / OUT: 5810 mL / NET: -4028.3 mL    01-18-22 @ 07:01  -  01-18-22 @ 10:28  --------------------------------------------------------  IN: 231.1 mL / OUT: 641 mL / NET: -409.9 mL        PHYSICAL EXAM:    GEN: Awake, comfortable. NAD.   HEENT: NCAT, PERRL, EOMI. Mucosa moist.   NECK: Supple, no JVD.   RESP: CTA b/l  CV: RRR, normal s1/s2. No m/r/g.  ABD: Soft, NTND. BS+  EXT: Warm. No edema, clubbing, or cyanosis.   NEURO: AAOx3. No focal deficits.    MEDICATIONS  (STANDING):  chlorhexidine 2% Cloths 1 Application(s) Topical <User Schedule>  colchicine 0.6 milliGRAM(s) Oral every 12 hours  dextrose 40% Gel 15 Gram(s) Oral once  dextrose 5%. 1000 milliLiter(s) (50 mL/Hr) IV Continuous <Continuous>  dextrose 5%. 1000 milliLiter(s) (100 mL/Hr) IV Continuous <Continuous>  dextrose 50% Injectable 25 Gram(s) IV Push once  dextrose 50% Injectable 12.5 Gram(s) IV Push once  dextrose 50% Injectable 25 Gram(s) IV Push once  furosemide Infusion 5 mG/Hr (2.5 mL/Hr) IV Continuous <Continuous>  glucagon  Injectable 1 milliGRAM(s) IntraMuscular once  heparin  Infusion 1700 Unit(s)/Hr (19 mL/Hr) IV Continuous <Continuous>  influenza   Vaccine 0.5 milliLiter(s) IntraMuscular once  milrinone Infusion 0.25 MICROgram(s)/kG/Min (7.4 mL/Hr) IV Continuous <Continuous>  nitroprusside Infusion 1.5 MICROgram(s)/kG/Min (22.2 mL/Hr) IV Continuous <Continuous>  pantoprazole    Tablet 40 milliGRAM(s) Oral before breakfast  potassium chloride  20 mEq/100 mL IVPB 20 milliEquivalent(s) IV Intermittent every 2 hours  spironolactone 25 milliGRAM(s) Oral every 24 hours    MEDICATIONS  (PRN):  acetaminophen     Tablet .. 650 milliGRAM(s) Oral every 6 hours PRN Mild Pain (1 - 3)  aluminum hydroxide/magnesium hydroxide/simethicone Suspension 30 milliLiter(s) Oral every 4 hours PRN Dyspepsia      Allergies    No Known Allergies    Intolerances        LABS:                        9.1    10.56 )-----------( 293      ( 18 Jan 2022 05:40 )             29.7     01-18    131<L>  |  93<L>  |  9   ----------------------------<  103<H>  3.7   |  27  |  0.75    Ca    8.9      18 Jan 2022 05:40  Phos  3.6     01-18  Mg     2.4     01-18    TPro  6.7  /  Alb  3.3  /  TBili  0.5  /  DBili  x   /  AST  41<H>  /  ALT  104<H>  /  AlkPhos  167<H>  01-18    PTT - ( 18 Jan 2022 05:40 )  PTT:98.2 sec    CARDIAC MARKERS ( 18 Jan 2022 05:40 )  x     / 0.93 ng/mL / 34 U/L / x     / 2.5 ng/mL  CARDIAC MARKERS ( 17 Jan 2022 05:54 )  x     / 1.61 ng/mL / 46 U/L / x     / 4.5 ng/mL      ECHO:   INTERVAL HPI/OVERNIGHT EVENTS: Patient seen and examined at bedside. Denies any chest pain, palpitations, SOB. Had some mild abdominal discomfort after breakfast. Enquiring about fluid balance and overall function of her heart.    Home Medications:  Multiple Vitamins oral tablet: 1 tab(s) orally once a day (13 Jan 2022 23:04)      VITAL SIGNS:  T(F): 98.8 (01-18-22 @ 10:00)  HR: 112 (01-18-22 @ 10:00)  BP: 96/59 (01-18-22 @ 10:00)  RR: 20 (01-18-22 @ 10:00)  SpO2: 96% (01-18-22 @ 10:00)  Wt(kg): --    01-17-22 @ 07:01  -  01-18-22 @ 07:00  --------------------------------------------------------  IN: 1781.7 mL / OUT: 5810 mL / NET: -4028.3 mL    01-18-22 @ 07:01  -  01-18-22 @ 10:28  --------------------------------------------------------  IN: 231.1 mL / OUT: 641 mL / NET: -409.9 mL        PHYSICAL EXAM:    GEN: Awake, comfortable. NAD.   HEENT: NCAT, PERRL, EOMI. Mucosa moist.   NECK: Supple. (+) RIJ introducer + PA catheter  RESP: CTA b/l  CV: tachycardic, RR, normal s1/s2. No m/r/g.  ABD: Soft, NTND. BS+  EXT: Warm. No edema, clubbing, or cyanosis. (+) R groin IABP  NEURO: AAOx3. No focal deficits.  PA CATHETER READINGS:  RAP - 9, PA - 40/20, PCWP - 20    MEDICATIONS  (STANDING):  chlorhexidine 2% Cloths 1 Application(s) Topical <User Schedule>  colchicine 0.6 milliGRAM(s) Oral every 12 hours  dextrose 40% Gel 15 Gram(s) Oral once  dextrose 5%. 1000 milliLiter(s) (50 mL/Hr) IV Continuous <Continuous>  dextrose 5%. 1000 milliLiter(s) (100 mL/Hr) IV Continuous <Continuous>  dextrose 50% Injectable 25 Gram(s) IV Push once  dextrose 50% Injectable 12.5 Gram(s) IV Push once  dextrose 50% Injectable 25 Gram(s) IV Push once  furosemide Infusion 5 mG/Hr (2.5 mL/Hr) IV Continuous <Continuous>  glucagon  Injectable 1 milliGRAM(s) IntraMuscular once  heparin  Infusion 1700 Unit(s)/Hr (19 mL/Hr) IV Continuous <Continuous>  influenza   Vaccine 0.5 milliLiter(s) IntraMuscular once  milrinone Infusion 0.25 MICROgram(s)/kG/Min (7.4 mL/Hr) IV Continuous <Continuous>  nitroprusside Infusion 1.5 MICROgram(s)/kG/Min (22.2 mL/Hr) IV Continuous <Continuous>  pantoprazole    Tablet 40 milliGRAM(s) Oral before breakfast  potassium chloride  20 mEq/100 mL IVPB 20 milliEquivalent(s) IV Intermittent every 2 hours  spironolactone 25 milliGRAM(s) Oral every 24 hours    MEDICATIONS  (PRN):  acetaminophen     Tablet .. 650 milliGRAM(s) Oral every 6 hours PRN Mild Pain (1 - 3)  aluminum hydroxide/magnesium hydroxide/simethicone Suspension 30 milliLiter(s) Oral every 4 hours PRN Dyspepsia      Allergies    No Known Allergies    Intolerances        LABS:                        9.1    10.56 )-----------( 293      ( 18 Jan 2022 05:40 )             29.7     01-18    131<L>  |  93<L>  |  9   ----------------------------<  103<H>  3.7   |  27  |  0.75    Ca    8.9      18 Jan 2022 05:40  Phos  3.6     01-18  Mg     2.4     01-18    TPro  6.7  /  Alb  3.3  /  TBili  0.5  /  DBili  x   /  AST  41<H>  /  ALT  104<H>  /  AlkPhos  167<H>  01-18    PTT - ( 18 Jan 2022 05:40 )  PTT:98.2 sec    CARDIAC MARKERS ( 18 Jan 2022 05:40 )  x     / 0.93 ng/mL / 34 U/L / x     / 2.5 ng/mL  CARDIAC MARKERS ( 17 Jan 2022 05:54 )  x     / 1.61 ng/mL / 46 U/L / x     / 4.5 ng/mL      ECHO:   < from: TTE Echo Complete w/o Contrast w/ Doppler (01.14.22 @ 11:35) >  ---------------------------------------------------------------------------  -----  CONCLUSIONS:     1. Patient was tachycardic during the study.   2. No significant valvular disease.   3. The right ventricle is normal in size. Right ventricular systolic   function is probably normal.   4. Left ventricular systolic function is severely reduced with a   calculated ejection fraction of 15% with regional wall motion   abnormalities. Relatively preserved basal wall motion. Remaining segments   are severely hypokinetic. Consider stress induced cardiomyopathy.   5. Trivial pericardial effusion.    ---------------------------------------------------------------------------  -----    < end of copied text >  < from: Cardiac Catheterization (01.14.22 @ 16:58) >  Conclusions:         - RHCon 1 mcg of Nipride     - RA 17     - RV 49/16     - PA 45/33/38     - PCW 35     - PA Sat 27     - Ao - 96%         RHC on Niprode 2 mcg and IABP     - PCW 23     - PA 28/17/23     - PA Sat 33     < end of copied text >  < from: Cardiac Catheterization (01.14.22 @ 16:58) >  General Diagnostic Impressions     There is no angiographic evidence for coronary artery disease.      < end of copied text >

## 2022-01-18 NOTE — PROGRESS NOTE ADULT - ASSESSMENT
36F w/hx of anemia transferred from [facility] on 1/13/22 with myopericarditis complicated by cardiogenic shock.

## 2022-01-18 NOTE — PROGRESS NOTE ADULT - SUBJECTIVE AND OBJECTIVE BOX
OVERNIGHT EVENTS: 7pm Krishna CO 5.2, CI 2.5, SV 48, . Lactate increased to 2.2. PTT therapeutic at 68.8. Balloon pump switched back to 1:1 at 10pm. Midnight PTT 70.8. AM PTT 98.2. AM Krishna CO 6.8, CI 3.3, SV 65, SVR  776. Decreased lasix gtt to 5mg/hr.    SUBJECTIVE / INTERVAL HPI: Patient seen and examined at bedside. Reports she is feeling better this morning and her breathing is smooth without SOB. Endorses continued dry cough however associated pain Patient denying chest pain, SOB, palpitations, cough. Patient denies fever, chills, HA, Dizziness, change in vision/hearing, N/V, abdominal pain, diarrhea, constipation, hematochezia/melena, dysuria, hematuria, new onset weakness/numbness, LE pain and/or swelling.    Remaining ROS negative       PHYSICAL EXAM:    General: WDWN  HEENT: NC/AT; PERRL, anicteric sclera; MMM  Neck: supple  Cardiovascular: +S1/S2, RRR  Respiratory: CTA B/L; no W/R/R  Gastrointestinal: soft, NT/ND; +BSx4  Extremities: WWP; no edema, clubbing or cyanosis  Vascular: 2+ radial, DP/PT pulses B/L  Neurological: AAOx3; no focal deficits  Psychiatric: pleasant mood and affect  Dermatologic: no appreciable wounds or damage to the skin    VITAL SIGNS:  Vital Signs Last 24 Hrs  T(C): 36.9 (18 Jan 2022 12:30), Max: 37.5 (17 Jan 2022 18:05)  T(F): 98.4 (18 Jan 2022 12:30), Max: 99.5 (17 Jan 2022 18:05)  HR: 114 (18 Jan 2022 14:00) (97 - 117)  BP: 84/53 (18 Jan 2022 14:00) (82/53 - 102/58)  BP(mean): 63 (18 Jan 2022 14:00) (62 - 78)  RR: 20 (18 Jan 2022 14:00) (12 - 27)  SpO2: 95% (18 Jan 2022 14:00) (92% - 98%)      MEDICATIONS:  MEDICATIONS  (STANDING):  chlorhexidine 2% Cloths 1 Application(s) Topical <User Schedule>  colchicine 0.6 milliGRAM(s) Oral every 12 hours  dextrose 40% Gel 15 Gram(s) Oral once  dextrose 5%. 1000 milliLiter(s) (50 mL/Hr) IV Continuous <Continuous>  dextrose 5%. 1000 milliLiter(s) (100 mL/Hr) IV Continuous <Continuous>  dextrose 50% Injectable 25 Gram(s) IV Push once  dextrose 50% Injectable 12.5 Gram(s) IV Push once  dextrose 50% Injectable 25 Gram(s) IV Push once  furosemide Infusion 5 mG/Hr (2.5 mL/Hr) IV Continuous <Continuous>  glucagon  Injectable 1 milliGRAM(s) IntraMuscular once  heparin  Infusion 1600 Unit(s)/Hr (16 mL/Hr) IV Continuous <Continuous>  influenza   Vaccine 0.5 milliLiter(s) IntraMuscular once  milrinone Infusion 0.25 MICROgram(s)/kG/Min (7.4 mL/Hr) IV Continuous <Continuous>  nitroprusside Infusion 1.5 MICROgram(s)/kG/Min (22.2 mL/Hr) IV Continuous <Continuous>  pantoprazole    Tablet 40 milliGRAM(s) Oral before breakfast  spironolactone 25 milliGRAM(s) Oral every 24 hours  valsartan 20 milliGRAM(s) Oral every 12 hours    MEDICATIONS  (PRN):  acetaminophen     Tablet .. 650 milliGRAM(s) Oral every 6 hours PRN Mild Pain (1 - 3)  aluminum hydroxide/magnesium hydroxide/simethicone Suspension 30 milliLiter(s) Oral every 4 hours PRN Dyspepsia      ALLERGIES:  Allergies    No Known Allergies    Intolerances        LABS:                        9.5    10.08 )-----------( 303      ( 18 Jan 2022 13:43 )             30.0     01-18    133<L>  |  94<L>  |  10  ----------------------------<  109<H>  4.4   |  26  |  0.76    Ca    8.8      18 Jan 2022 13:43  Phos  3.6     01-18  Mg     2.4     01-18    TPro  6.7  /  Alb  3.4  /  TBili  0.4  /  DBili  x   /  AST  46<H>  /  ALT  93<H>  /  AlkPhos  173<H>  01-18    PTT - ( 18 Jan 2022 13:43 )  PTT:56.3 sec    CAPILLARY BLOOD GLUCOSE          RADIOLOGY & ADDITIONAL TESTS: Reviewed. OVERNIGHT EVENTS: 7pm Krishna CO 5.2, CI 2.5, SV 48, . Lactate increased to 2.2. PTT therapeutic at 68.8. Balloon pump switched back to 1:1 at 10pm. Midnight PTT 70.8. AM PTT 98.2. AM Krishna CO 6.8, CI 3.3, SV 65, SVR  776. Decreased lasix gtt to 5mg/hr.    SUBJECTIVE / INTERVAL HPI: Patient seen and examined at bedside. Reports she is feeling better this morning and her breathing is smooth without SOB. Endorses continued dry cough however associated pain from cough improved. Good PO intake, ate dinner last night. On HFNC 40/40 overnight with 6LNC during the day for comfort, patient tolerating well. Patient denying chest pain, SOB, palpitations. Patient denies fever, chills, HA, Dizziness, N/V, abdominal pain, diarrhea, constipation, hematochezia/melena, dysuria, hematuria, new onset weakness/numbness, LE pain and/or swelling.  Remaining ROS negative   Balloon pump switched back to 2:1 this morning and then later 3:1 in anticipation of weaning off.     PHYSICAL EXAM:    General: resting comfortably in bed in NAD  HEENT: NC/AT; PERRL, anicteric sclera; MMM  Neck: supple, no JVD. swan in right IJ.  Cardiovascular: +S1/S2, tachycardic rate, regular rhythm, no murmurs   Respiratory: CTA B/L; no W/R/R. satting well on 6LNC. no increased WOB.   Gastrointestinal: soft, NT/ND; +BSx4  Extremities: WWP; no edema, clubbing or cyanosis. right femoral A line for IABP. Right radial A line.   Vascular: 2+ radial, DP pulses B/L  Neurological: AAOx3; no focal deficits  Dermatologic: no appreciable wounds or damage to the skin    VITAL SIGNS:  Vital Signs Last 24 Hrs  T(C): 36.9 (18 Jan 2022 12:30), Max: 37.5 (17 Jan 2022 18:05)  T(F): 98.4 (18 Jan 2022 12:30), Max: 99.5 (17 Jan 2022 18:05)  HR: 114 (18 Jan 2022 14:00) (97 - 117)  BP: 84/53 (18 Jan 2022 14:00) (82/53 - 102/58)  BP(mean): 63 (18 Jan 2022 14:00) (62 - 78)  RR: 20 (18 Jan 2022 14:00) (12 - 27)  SpO2: 95% (18 Jan 2022 14:00) (92% - 98%)      MEDICATIONS:  MEDICATIONS  (STANDING):  chlorhexidine 2% Cloths 1 Application(s) Topical <User Schedule>  colchicine 0.6 milliGRAM(s) Oral every 12 hours  dextrose 40% Gel 15 Gram(s) Oral once  dextrose 5%. 1000 milliLiter(s) (50 mL/Hr) IV Continuous <Continuous>  dextrose 5%. 1000 milliLiter(s) (100 mL/Hr) IV Continuous <Continuous>  dextrose 50% Injectable 25 Gram(s) IV Push once  dextrose 50% Injectable 12.5 Gram(s) IV Push once  dextrose 50% Injectable 25 Gram(s) IV Push once  furosemide Infusion 5 mG/Hr (2.5 mL/Hr) IV Continuous <Continuous>  glucagon  Injectable 1 milliGRAM(s) IntraMuscular once  heparin  Infusion 1600 Unit(s)/Hr (16 mL/Hr) IV Continuous <Continuous>  influenza   Vaccine 0.5 milliLiter(s) IntraMuscular once  milrinone Infusion 0.25 MICROgram(s)/kG/Min (7.4 mL/Hr) IV Continuous <Continuous>  nitroprusside Infusion 1.5 MICROgram(s)/kG/Min (22.2 mL/Hr) IV Continuous <Continuous>  pantoprazole    Tablet 40 milliGRAM(s) Oral before breakfast  spironolactone 25 milliGRAM(s) Oral every 24 hours  valsartan 20 milliGRAM(s) Oral every 12 hours    MEDICATIONS  (PRN):  acetaminophen     Tablet .. 650 milliGRAM(s) Oral every 6 hours PRN Mild Pain (1 - 3)  aluminum hydroxide/magnesium hydroxide/simethicone Suspension 30 milliLiter(s) Oral every 4 hours PRN Dyspepsia      ALLERGIES:  Allergies    No Known Allergies    Intolerances        LABS:                        9.5    10.08 )-----------( 303      ( 18 Jan 2022 13:43 )             30.0     01-18    133<L>  |  94<L>  |  10  ----------------------------<  109<H>  4.4   |  26  |  0.76    Ca    8.8      18 Jan 2022 13:43  Phos  3.6     01-18  Mg     2.4     01-18    TPro  6.7  /  Alb  3.4  /  TBili  0.4  /  DBili  x   /  AST  46<H>  /  ALT  93<H>  /  AlkPhos  173<H>  01-18    PTT - ( 18 Jan 2022 13:43 )  PTT:56.3 sec    CAPILLARY BLOOD GLUCOSE          RADIOLOGY & ADDITIONAL TESTS: Reviewed.

## 2022-01-18 NOTE — PROGRESS NOTE ADULT - PROBLEM SELECTOR PLAN 1
Improving. Primary team weaning patient from IABP; currently on 1:3 Improving. Warm and wet on exam. Primary team weaning patient from IABP; currently on 1:3. Bedside measurements still show elevated filling pressures with CVP - 9, PA - 40/20, PCWP - 20. CI this AM calculated at 3.3.  - agree with IABP wean. Can consider removal tomorrow if patient remains stable  - start milrinone 0.250 mcg/kg/min   - repeat hemodynamics & labs ~4 hrs post milrinone initiation  - agree with lasix gtt @ 5mg/hr  - would repeat limited TTE to assess for improvement in LVEF  - start valsartan 20mg bid; can wean nitroprusside if needed  - c/w Spironolactone 25mg PO QD

## 2022-01-18 NOTE — PROGRESS NOTE ADULT - ATTENDING COMMENTS
35 YO F with a history of morbid obesity and chronic anemia from menorrhagia who presented to [facility] days-weeks of worsening chest pain and heart failure symptoms and found to have elevated troponin levels with diffuse nonspecific ST elevations associated with severe LV dysfunction. She had signs of end organ perfusion (lactate 2.4, transaminitis) and underwent R/LHC 1/14 which revealed severely elevated filing pressures and low cardiac output prompting placement of IABP.    Overall, presentation is consistent with cardiogenic shock in setting of likely myocarditis (possibly COVID-19 related). Her hemodynamics today are notable for mild-moderately elevated filling pressures and normal cardiac output on IABP support. Will start milrinone and reassess hemodynamics after 4 hours of 1:3 support and consider removing IABP if they remain favorable with low threshold to maintain support since she remains with sinus tachycardia and borderline BP (albeit on nipride). Will also repeat TTE to assess for qualitative improvement.     HEMODYNAMICS  1/18 (IABP 1:1, nipride 1.5): RA 9, PA 40/20 (27), PCWP 20, PA sat 68% with Augustin CO/CI 7.6/3.7. BP 89/60 with IABP on standby and on nipride.     REVIEW OF STUDIES  TTE 1/14: LV 4.3 cm, LVEF 15% with relatively preserved basal motion, LVOT VTI 11 cm, grossly normal RV function, no significant valvular abnormalities    COVID PCR: negative. COVID spike and nucleocapsid Ab positive.      PLAN  # Acute systolic heart failure with cardiogenic shock  - Repeat TTE to assess for any qualitative improvement  - Start milrinone 0.25 mcg/kg/min  - Decreased IABP to 1:3 and plan to repeat hemodynamics in 4 hours. If no significant rise in filling pressures or heart rate and cardiac output remains preserved then OK to remove IABP  - GDMT: start valsartan 20 mg BID and will increase as tolerates while weaning nipride as appropriate. continue spironolactone 25 mg daily. eventual BB.  - Diuretics: agree with decreasing lasix drip to 5 mg/hr, goal negative 1-2 L daily and goal CVP 6-8  IABP, nipride  - Advanced therapies: will continue to closely monitor for recovery otherwise will transfer to [facility] for advanced therapies evaluation. May need to consider OHT despite BMI > 40 due to small LV.    # Myopericarditis  - Reasonable to continue colchicine  - Eventual Cardiac MRI     # Hyponatremia  - hypervolemic hyponatremia, improving with diuretics    # Anemia  - iron deficiency anemia likely from menorrhagia   - administer IV sucrose 200 mg x 5 days    # Transaminitis  - improving with hemodynamic support 37 YO F with a history of morbid obesity and chronic anemia from menorrhagia who presented to [facility] days-weeks of worsening chest pain and heart failure symptoms and found to have elevated troponin levels with diffuse nonspecific ST elevations associated with severe LV dysfunction. She had signs of end organ perfusion (lactate 2.4, transaminitis) and underwent R/LHC 1/14 which revealed severely elevated filing pressures and low cardiac output prompting placement of IABP.    Overall, presentation is consistent with cardiogenic shock in setting of likely myocarditis (possibly COVID-19 related). Her hemodynamics today are notable for mild-moderately elevated filling pressures and normal cardiac output on IABP support. Will start milrinone and reassess hemodynamics after 4 hours of 1:3 support and consider removing IABP if they remain favorable with low threshold to maintain support since she remains with sinus tachycardia and borderline BP (albeit on nipride). Will also repeat TTE to assess for qualitative improvement.     HEMODYNAMICS  1/18 (IABP 1:1, nipride 1.5): RA 9, PA 40/20 (27), PCWP 20, PA sat 68% with Augustin CO/CI 7.6/3.7. BP 89/60 with IABP on standby and on nipride.     REVIEW OF STUDIES  TTE 1/14: LV 4.3 cm, LVEF 15% with relatively preserved basal motion, LVOT VTI 11 cm, grossly normal RV function, no significant valvular abnormalities    COVID PCR: negative. COVID spike and nucleocapsid Ab positive.      PLAN  # Acute systolic heart failure with cardiogenic shock  - Repeat TTE to assess for any qualitative improvement  - Start milrinone 0.25 mcg/kg/min  - Decreased IABP to 1:3 and plan to repeat hemodynamics in 4 hours. If no significant rise in filling pressures or heart rate and cardiac output remains preserved then OK to remove IABP  - GDMT: start valsartan 20 mg BID and will increase as tolerates while weaning nipride as appropriate. continue spironolactone 25 mg daily. eventual BB.  - Diuretics: agree with decreasing lasix drip to 5 mg/hr, goal negative 1-2 L daily and goal CVP 6-8  IABP, nipride  - Advanced therapies: will continue to closely monitor for recovery otherwise will transfer to [facility] for advanced therapies evaluation. May need to consider OHT despite BMI > 40 due to small LV.    # Myopericarditis  - Reasonable to continue colchicine  - Eventual Cardiac MRI     # Hyponatremia  - hypervolemic hyponatremia, improving with diuretics    # Anemia  - iron deficiency anemia likely from menorrhagia   - administer IV sucrose 200 mg x 5 days    # Transaminitis  - improving with hemodynamic support    # Hypoxia  - likely some degree of pulmonary edema but also at risk for PE, if no improvement with diuresis would obtain CTA

## 2022-01-18 NOTE — PROGRESS NOTE ADULT - PROBLEM SELECTOR PLAN 3
Improving. Cardiac enzymes downtrending  - c/w supportive management for cardiogenic shock  - will pursue cMRI once euvolemic

## 2022-01-19 DIAGNOSIS — J96.01 ACUTE RESPIRATORY FAILURE WITH HYPOXIA: ICD-10-CM

## 2022-01-19 DIAGNOSIS — D64.9 ANEMIA, UNSPECIFIED: ICD-10-CM

## 2022-01-19 LAB
ALBUMIN SERPL ELPH-MCNC: 3.7 G/DL — SIGNIFICANT CHANGE UP (ref 3.3–5)
ALBUMIN SERPL ELPH-MCNC: 3.8 G/DL — SIGNIFICANT CHANGE UP (ref 3.3–5)
ALP SERPL-CCNC: 168 U/L — HIGH (ref 40–120)
ALP SERPL-CCNC: 179 U/L — HIGH (ref 40–120)
ALT FLD-CCNC: 87 U/L — HIGH (ref 10–45)
ALT FLD-CCNC: SIGNIFICANT CHANGE UP (ref 10–45)
ANION GAP SERPL CALC-SCNC: 13 MMOL/L — SIGNIFICANT CHANGE UP (ref 5–17)
ANION GAP SERPL CALC-SCNC: 14 MMOL/L — SIGNIFICANT CHANGE UP (ref 5–17)
AST SERPL-CCNC: 36 U/L — SIGNIFICANT CHANGE UP (ref 10–40)
AST SERPL-CCNC: SIGNIFICANT CHANGE UP (ref 10–40)
BASE EXCESS BLDA CALC-SCNC: 5.6 MMOL/L — HIGH (ref -2–3)
BASE EXCESS BLDA CALC-SCNC: 7.5 MMOL/L — HIGH (ref -2–3)
BASE EXCESS BLDMV CALC-SCNC: 4.5 MMOL/L — SIGNIFICANT CHANGE UP
BASE EXCESS BLDMV CALC-SCNC: 5.7 MMOL/L — SIGNIFICANT CHANGE UP
BASOPHILS # BLD AUTO: 0.05 K/UL — SIGNIFICANT CHANGE UP (ref 0–0.2)
BASOPHILS NFR BLD AUTO: 0.5 % — SIGNIFICANT CHANGE UP (ref 0–2)
BILIRUB SERPL-MCNC: 0.4 MG/DL — SIGNIFICANT CHANGE UP (ref 0.2–1.2)
BILIRUB SERPL-MCNC: 0.5 MG/DL — SIGNIFICANT CHANGE UP (ref 0.2–1.2)
BUN SERPL-MCNC: 10 MG/DL — SIGNIFICANT CHANGE UP (ref 7–23)
BUN SERPL-MCNC: 10 MG/DL — SIGNIFICANT CHANGE UP (ref 7–23)
CALCIUM SERPL-MCNC: 9 MG/DL — SIGNIFICANT CHANGE UP (ref 8.4–10.5)
CALCIUM SERPL-MCNC: 9 MG/DL — SIGNIFICANT CHANGE UP (ref 8.4–10.5)
CHLORIDE SERPL-SCNC: 90 MMOL/L — LOW (ref 96–108)
CHLORIDE SERPL-SCNC: 92 MMOL/L — LOW (ref 96–108)
CO2 BLDA-SCNC: 30 MMOL/L — HIGH (ref 19–24)
CO2 BLDA-SCNC: 32 MMOL/L — HIGH (ref 19–24)
CO2 BLDMV-SCNC: 32 MMOL/L — SIGNIFICANT CHANGE UP
CO2 SERPL-SCNC: 24 MMOL/L — SIGNIFICANT CHANGE UP (ref 22–31)
CO2 SERPL-SCNC: 25 MMOL/L — SIGNIFICANT CHANGE UP (ref 22–31)
COHGB MFR BLDMV: 1.5 % — SIGNIFICANT CHANGE UP
COHGB MFR BLDMV: 1.6 % — SIGNIFICANT CHANGE UP
CREAT SERPL-MCNC: 0.63 MG/DL — SIGNIFICANT CHANGE UP (ref 0.5–1.3)
CREAT SERPL-MCNC: 0.67 MG/DL — SIGNIFICANT CHANGE UP (ref 0.5–1.3)
EOSINOPHIL # BLD AUTO: 0.32 K/UL — SIGNIFICANT CHANGE UP (ref 0–0.5)
EOSINOPHIL NFR BLD AUTO: 3 % — SIGNIFICANT CHANGE UP (ref 0–6)
GAS PNL BLDMV: SIGNIFICANT CHANGE UP
GLUCOSE SERPL-MCNC: 110 MG/DL — HIGH (ref 70–99)
GLUCOSE SERPL-MCNC: 115 MG/DL — HIGH (ref 70–99)
HCO3 BLDA-SCNC: 29 MMOL/L — HIGH (ref 21–28)
HCO3 BLDA-SCNC: 31 MMOL/L — HIGH (ref 21–28)
HCO3 BLDMV-SCNC: 29 MMOL/L — SIGNIFICANT CHANGE UP
HCO3 BLDMV-SCNC: 31 MMOL/L — SIGNIFICANT CHANGE UP
HCT VFR BLD CALC: 32.4 % — LOW (ref 34.5–45)
HCT VFR BLD CALC: 33.6 % — LOW (ref 34.5–45)
HGB BLD-MCNC: 10.4 G/DL — LOW (ref 11.5–15.5)
HGB BLD-MCNC: 10.9 G/DL — LOW (ref 11.5–15.5)
HGB FLD-MCNC: 10 G/DL — LOW (ref 11.7–16.1)
HGB FLD-MCNC: 11.3 G/DL — LOW (ref 11.7–16.1)
IGG SERPL-MCNC: 860 MG/DL — SIGNIFICANT CHANGE UP (ref 586–1602)
IGG1 SER-MCNC: 457 MG/DL — SIGNIFICANT CHANGE UP (ref 248–810)
IGG2 SER-MCNC: 274 MG/DL — SIGNIFICANT CHANGE UP (ref 130–555)
IGG3 SER-MCNC: 101 MG/DL — SIGNIFICANT CHANGE UP (ref 15–102)
IGG4 SER-MCNC: 32 MG/DL — SIGNIFICANT CHANGE UP (ref 2–96)
IMM GRANULOCYTES NFR BLD AUTO: 1.2 % — SIGNIFICANT CHANGE UP (ref 0–1.5)
LACTATE SERPL-SCNC: 1.1 MMOL/L — SIGNIFICANT CHANGE UP (ref 0.5–2)
LACTATE SERPL-SCNC: 1.6 MMOL/L — SIGNIFICANT CHANGE UP (ref 0.5–2)
LYMPHOCYTES # BLD AUTO: 1.44 K/UL — SIGNIFICANT CHANGE UP (ref 1–3.3)
LYMPHOCYTES # BLD AUTO: 13.4 % — SIGNIFICANT CHANGE UP (ref 13–44)
MAGNESIUM SERPL-MCNC: 2.4 MG/DL — SIGNIFICANT CHANGE UP (ref 1.6–2.6)
MAGNESIUM SERPL-MCNC: 2.5 MG/DL — SIGNIFICANT CHANGE UP (ref 1.6–2.6)
MCHC RBC-ENTMCNC: 29 PG — SIGNIFICANT CHANGE UP (ref 27–34)
MCHC RBC-ENTMCNC: 29.1 PG — SIGNIFICANT CHANGE UP (ref 27–34)
MCHC RBC-ENTMCNC: 32.1 GM/DL — SIGNIFICANT CHANGE UP (ref 32–36)
MCHC RBC-ENTMCNC: 32.4 GM/DL — SIGNIFICANT CHANGE UP (ref 32–36)
MCV RBC AUTO: 89.6 FL — SIGNIFICANT CHANGE UP (ref 80–100)
MCV RBC AUTO: 90.3 FL — SIGNIFICANT CHANGE UP (ref 80–100)
METHGB MFR BLDMV: 0.4 % — SIGNIFICANT CHANGE UP
METHGB MFR BLDMV: 0.4 % — SIGNIFICANT CHANGE UP
MONOCYTES # BLD AUTO: 0.76 K/UL — SIGNIFICANT CHANGE UP (ref 0–0.9)
MONOCYTES NFR BLD AUTO: 7.1 % — SIGNIFICANT CHANGE UP (ref 2–14)
NEUTROPHILS # BLD AUTO: 8.01 K/UL — HIGH (ref 1.8–7.4)
NEUTROPHILS NFR BLD AUTO: 74.8 % — SIGNIFICANT CHANGE UP (ref 43–77)
NRBC # BLD: 0 /100 WBCS — SIGNIFICANT CHANGE UP (ref 0–0)
NRBC # BLD: 0 /100 WBCS — SIGNIFICANT CHANGE UP (ref 0–0)
O2 CT VFR BLD CALC: 36 MMHG — SIGNIFICANT CHANGE UP
O2 CT VFR BLD CALC: 41 MMHG — SIGNIFICANT CHANGE UP
O2 CT VFR BLDMV CALC: 10 ML/DL — SIGNIFICANT CHANGE UP
OXYHGB MFR BLDMV: 64.6 % — SIGNIFICANT CHANGE UP
OXYHGB MFR BLDMV: 71 % — SIGNIFICANT CHANGE UP
PCO2 BLDA: 36 MMHG — HIGH (ref 32–35)
PCO2 BLDA: 39 MMHG — HIGH (ref 32–35)
PCO2 BLDMV: 43 MMHG — SIGNIFICANT CHANGE UP
PCO2 BLDMV: 45 MMHG — SIGNIFICANT CHANGE UP
PH BLDA: 7.51 — HIGH (ref 7.35–7.45)
PH BLDA: 7.51 — HIGH (ref 7.35–7.45)
PH BLDMV: 7.44 — SIGNIFICANT CHANGE UP
PH BLDMV: 7.44 — SIGNIFICANT CHANGE UP
PHOSPHATE SERPL-MCNC: 4.8 MG/DL — HIGH (ref 2.5–4.5)
PHOSPHATE SERPL-MCNC: 4.9 MG/DL — HIGH (ref 2.5–4.5)
PLATELET # BLD AUTO: 315 K/UL — SIGNIFICANT CHANGE UP (ref 150–400)
PLATELET # BLD AUTO: 320 K/UL — SIGNIFICANT CHANGE UP (ref 150–400)
PO2 BLDA: 105 MMHG — SIGNIFICANT CHANGE UP (ref 83–108)
PO2 BLDA: 90 MMHG — SIGNIFICANT CHANGE UP (ref 83–108)
POTASSIUM SERPL-MCNC: 4.2 MMOL/L — SIGNIFICANT CHANGE UP (ref 3.5–5.3)
POTASSIUM SERPL-MCNC: SIGNIFICANT CHANGE UP (ref 3.5–5.3)
POTASSIUM SERPL-SCNC: 4.2 MMOL/L — SIGNIFICANT CHANGE UP (ref 3.5–5.3)
POTASSIUM SERPL-SCNC: SIGNIFICANT CHANGE UP (ref 3.5–5.3)
PROT SERPL-MCNC: 7.1 G/DL — SIGNIFICANT CHANGE UP (ref 6–8.3)
PROT SERPL-MCNC: 7.4 G/DL — SIGNIFICANT CHANGE UP (ref 6–8.3)
RBC # BLD: 3.59 M/UL — LOW (ref 3.8–5.2)
RBC # BLD: 3.75 M/UL — LOW (ref 3.8–5.2)
RBC # FLD: 17 % — HIGH (ref 10.3–14.5)
RBC # FLD: 17.1 % — HIGH (ref 10.3–14.5)
SAO2 % BLDA: 97.7 % — SIGNIFICANT CHANGE UP (ref 94–98)
SAO2 % BLDA: 99.1 % — HIGH (ref 94–98)
SAO2 % BLDMV: 65 % — SIGNIFICANT CHANGE UP
SAO2 % BLDMV: 73 % — SIGNIFICANT CHANGE UP
SODIUM SERPL-SCNC: 128 MMOL/L — LOW (ref 135–145)
SODIUM SERPL-SCNC: 130 MMOL/L — LOW (ref 135–145)
WBC # BLD: 10.08 K/UL — SIGNIFICANT CHANGE UP (ref 3.8–10.5)
WBC # BLD: 10.71 K/UL — HIGH (ref 3.8–10.5)
WBC # FLD AUTO: 10.08 K/UL — SIGNIFICANT CHANGE UP (ref 3.8–10.5)
WBC # FLD AUTO: 10.71 K/UL — HIGH (ref 3.8–10.5)

## 2022-01-19 PROCEDURE — 99291 CRITICAL CARE FIRST HOUR: CPT

## 2022-01-19 PROCEDURE — 93306 TTE W/DOPPLER COMPLETE: CPT | Mod: 26

## 2022-01-19 PROCEDURE — 99292 CRITICAL CARE ADDL 30 MIN: CPT

## 2022-01-19 PROCEDURE — 71275 CT ANGIOGRAPHY CHEST: CPT | Mod: 26

## 2022-01-19 PROCEDURE — 71045 X-RAY EXAM CHEST 1 VIEW: CPT | Mod: 26

## 2022-01-19 RX ORDER — IRON SUCROSE 20 MG/ML
300 INJECTION, SOLUTION INTRAVENOUS EVERY 24 HOURS
Refills: 0 | Status: DISCONTINUED | OUTPATIENT
Start: 2022-01-19 | End: 2022-01-22

## 2022-01-19 RX ORDER — ASPIRIN/CALCIUM CARB/MAGNESIUM 324 MG
325 TABLET ORAL ONCE
Refills: 0 | Status: COMPLETED | OUTPATIENT
Start: 2022-01-19 | End: 2022-01-19

## 2022-01-19 RX ORDER — ENOXAPARIN SODIUM 100 MG/ML
40 INJECTION SUBCUTANEOUS EVERY 12 HOURS
Refills: 0 | Status: DISCONTINUED | OUTPATIENT
Start: 2022-01-19 | End: 2022-01-23

## 2022-01-19 RX ADMIN — SPIRONOLACTONE 25 MILLIGRAM(S): 25 TABLET, FILM COATED ORAL at 06:26

## 2022-01-19 RX ADMIN — PANTOPRAZOLE SODIUM 40 MILLIGRAM(S): 20 TABLET, DELAYED RELEASE ORAL at 06:26

## 2022-01-19 RX ADMIN — CHLORHEXIDINE GLUCONATE 1 APPLICATION(S): 213 SOLUTION TOPICAL at 06:30

## 2022-01-19 RX ADMIN — Medication 0.6 MILLIGRAM(S): at 10:53

## 2022-01-19 RX ADMIN — SODIUM NITROPRUSSIDE 22.2 MICROGRAM(S)/KG/MIN: 50 INJECTION INTRAVENOUS at 11:48

## 2022-01-19 RX ADMIN — Medication 0.6 MILLIGRAM(S): at 22:36

## 2022-01-19 RX ADMIN — MILRINONE LACTATE 7.4 MICROGRAM(S)/KG/MIN: 1 INJECTION, SOLUTION INTRAVENOUS at 11:57

## 2022-01-19 RX ADMIN — VALSARTAN 20 MILLIGRAM(S): 80 TABLET ORAL at 22:37

## 2022-01-19 RX ADMIN — Medication 325 MILLIGRAM(S): at 06:26

## 2022-01-19 RX ADMIN — VALSARTAN 20 MILLIGRAM(S): 80 TABLET ORAL at 11:47

## 2022-01-19 RX ADMIN — IRON SUCROSE 176.67 MILLIGRAM(S): 20 INJECTION, SOLUTION INTRAVENOUS at 11:57

## 2022-01-19 RX ADMIN — ENOXAPARIN SODIUM 40 MILLIGRAM(S): 100 INJECTION SUBCUTANEOUS at 20:22

## 2022-01-19 NOTE — PROGRESS NOTE ADULT - PROBLEM SELECTOR PLAN 3
Improving. Cardiac enzymes downtrending  - c/w supportive management for cardiogenic shock  - will pursue cMRI once euvolemic Pt w/supplemental O2 requirements out of proportion to her heart failure exacerbation. Unclear if a secondary process is also present. Given immobility since admission, can consider PE (though she had been on anticoagulation while the IABP was present). Possible atelectasis component as well?  - incentive spirometry  - wean O2  - consider further lung imaging if hypoxia persists - CT chest noncon vs CTA PE protocol

## 2022-01-19 NOTE — PROGRESS NOTE ADULT - ATTENDING COMMENTS
35 YO F with a history of morbid obesity and chronic anemia from menorrhagia who presented to [facility] with days-weeks of worsening chest pain and heart failure symptoms and found to have elevated troponin levels with diffuse nonspecific ST elevations associated with severe LV dysfunction. She had signs of end organ perfusion (lactate 2.4, transaminitis) and underwent R/LHC 1/14 which revealed severely elevated filing pressures and low cardiac output prompting placement of IABP. Her hemodynamics improved and IABP was successfully removed on 1/18. She has mildly elevated filling pressures and adequate cardiac output on inotrope support and remains with severe LV dysfunction though has clinically improved.     Overall, presentation is consistent with cardiogenic shock in setting of likely myocarditis (possibly COVID-19 related).     HEMODYNAMICS  1/18 (IABP 1:1, nipride 1.5): RA 9, PA 40/20 (27), PCWP 20, PA sat 68% with Augustin CO/CI 7.6/3.7. BP 89/60 with IABP on standby and on nipride.   1/19 (milrinone 0.25, nipride 1.5): RA 8, PA 30/12, PCWP 11, PA sat 71% with Augustin CO/CI 7.4/3.6    REVIEW OF STUDIES  TTE 1/14: LV 4.3 cm, LVEF 15% with relatively preserved basal motion, LVOT VTI 11 cm, grossly normal RV function, no significant valvular abnormalities:     TTE 1/19: LV 4.3 cm, LVEF visually 20-25% with global hypokinesis worse in the apex, LVOT VTI 13 cm, normal RV size/function    COVID PCR: negative. COVID spike and nucleocapsid Ab positive.      PLAN  # Acute systolic heart failure with cardiogenic shock  - If hemodynamics acceptable this evening OK to remove PAC  - Continue milrinone 0.25, will likely start slow wean tomorrow   - Stop nipride  - GDMT: continue valsartan 20 mg BID and will increase as tolerates with plans to eventually switch to Entresto. continue spironolactone 25 mg daily. eventual BB.  - Diuretics: decrease lasix drip to 2.5 mg/hr, goal negative 1-2 L daily and goal CVP 6-8    # Myopericarditis  - Reasonable to continue colchicine  - Eventual Cardiac MRI after PAC removed    # Hyponatremia  - hypervolemic hyponatremia, improving with diuretics    # Anemia  - iron deficiency anemia likely from menorrhagia   - administer IV sucrose 200 mg x 5 days    # Transaminitis  - improving with hemodynamic support    # Hypoxia  - has persisted despite low PCWP, given intermittent CP and recent COVID-19 infection would obtain CTA to evaluate for PE

## 2022-01-19 NOTE — PROGRESS NOTE ADULT - SUBJECTIVE AND OBJECTIVE BOX
INTERVAL HPI/OVERNIGHT EVENTS: Patient seen and examined at bedside. Denies any chest pain, palpitations, or SOB. Feels breathing is better than yesterday.    Home Medications:  Multiple Vitamins oral tablet: 1 tab(s) orally once a day (13 Jan 2022 23:04)      VITAL SIGNS:  T(F): 98.4 (01-19-22 @ 06:15)  HR: 117 (01-19-22 @ 11:00)  BP: 99/64 (01-18-22 @ 21:00)  RR: 26 (01-19-22 @ 11:00)  SpO2: 92% (01-19-22 @ 11:00)  Wt(kg): --    01-18-22 @ 07:01  -  01-19-22 @ 07:00  --------------------------------------------------------  IN: 1151.2 mL / OUT: 4765 mL / NET: -3613.8 mL    01-19-22 @ 07:01  -  01-19-22 @ 11:48  --------------------------------------------------------  IN: 328.4 mL / OUT: 390 mL / NET: -61.6 mL        PHYSICAL EXAM:    GEN: Awake, comfortable. NAD.   HEENT: NCAT, PERRL, EOMI. Mucosa moist.   NECK: Supple. (+) RIJ introducer + PA catheter  RESP: CTA b/l  CV: tachycardic, RR, normal s1/s2. No m/r/g.  ABD: Soft, NTND. BS+  EXT: Warm. No edema, clubbing, or cyanosis.  NEURO: AAOx3. No focal deficits.  PA CATHETER READINGS:  RAP - 8, PA - 30/12, PCWP - 11    MEDICATIONS  (STANDING):  chlorhexidine 2% Cloths 1 Application(s) Topical <User Schedule>  colchicine 0.6 milliGRAM(s) Oral every 12 hours  dextrose 40% Gel 15 Gram(s) Oral once  dextrose 5%. 1000 milliLiter(s) (50 mL/Hr) IV Continuous <Continuous>  dextrose 5%. 1000 milliLiter(s) (100 mL/Hr) IV Continuous <Continuous>  dextrose 50% Injectable 25 Gram(s) IV Push once  dextrose 50% Injectable 12.5 Gram(s) IV Push once  dextrose 50% Injectable 25 Gram(s) IV Push once  furosemide Infusion 5 mG/Hr (2.5 mL/Hr) IV Continuous <Continuous>  glucagon  Injectable 1 milliGRAM(s) IntraMuscular once  influenza   Vaccine 0.5 milliLiter(s) IntraMuscular once  iron sucrose IVPB 300 milliGRAM(s) IV Intermittent every 24 hours  milrinone Infusion 0.25 MICROgram(s)/kG/Min (7.4 mL/Hr) IV Continuous <Continuous>  nitroprusside Infusion 1.5 MICROgram(s)/kG/Min (22.2 mL/Hr) IV Continuous <Continuous>  pantoprazole    Tablet 40 milliGRAM(s) Oral before breakfast  spironolactone 25 milliGRAM(s) Oral every 24 hours  valsartan 20 milliGRAM(s) Oral every 12 hours    MEDICATIONS  (PRN):  acetaminophen     Tablet .. 650 milliGRAM(s) Oral every 6 hours PRN Mild Pain (1 - 3)  aluminum hydroxide/magnesium hydroxide/simethicone Suspension 30 milliLiter(s) Oral every 4 hours PRN Dyspepsia      Allergies    No Known Allergies    Intolerances        LABS:                        10.4   10.71 )-----------( 315      ( 19 Jan 2022 05:57 )             32.4     01-19    128<L>  |  90<L>  |  10  ----------------------------<  110<H>  4.2   |  25  |  0.63    Ca    9.0      19 Jan 2022 05:57  Phos  4.9     01-19  Mg     2.4     01-19    TPro  7.1  /  Alb  3.8  /  TBili  0.5  /  DBili  x   /  AST  36  /  ALT  87<H>  /  AlkPhos  179<H>  01-19    PTT - ( 18 Jan 2022 16:23 )  PTT:29.3 sec    CARDIAC MARKERS ( 18 Jan 2022 05:40 )  x     / 0.93 ng/mL / 34 U/L / x     / 2.5 ng/mL      ECHO:  < from: TTE Echo Complete w/o Contrast w/ Doppler (01.14.22 @ 11:35) >  ---------------------------------------------------------------------------  -----  CONCLUSIONS:     1. Patient was tachycardic during the study.   2. No significant valvular disease.   3. The right ventricle is normal in size. Right ventricular systolic   function is probably normal.   4. Left ventricular systolic function is severely reduced with a   calculated ejection fraction of 15% with regional wall motion   abnormalities. Relatively preserved basal wall motion. Remaining segments   are severely hypokinetic. Consider stress induced cardiomyopathy.   5. Trivial pericardial effusion.    ---------------------------------------------------------------------------  -----    < end of copied text >  < from: Cardiac Catheterization (01.14.22 @ 16:58) >  Conclusions:         - RHCon 1 mcg of Nipride     - RA 17     - RV 49/16     - PA 45/33/38     - PCW 35     - PA Sat 27     - Ao - 96%         RHC on Niprode 2 mcg and IABP     - PCW 23     - PA 28/17/23     - PA Sat 33     < end of copied text >  < from: Cardiac Catheterization (01.14.22 @ 16:58) >  General Diagnostic Impressions     There is no angiographic evidence for coronary artery disease.      < end of copied text > INTERVAL HPI/OVERNIGHT EVENTS: Patient seen and examined at bedside. IABP removed yesterday. Denies any chest pain, palpitations, or SOB. Feels breathing is better than yesterday.    Home Medications:  Multiple Vitamins oral tablet: 1 tab(s) orally once a day (13 Jan 2022 23:04)      VITAL SIGNS:  T(F): 98.4 (01-19-22 @ 06:15)  HR: 117 (01-19-22 @ 11:00)  BP: 99/64 (01-18-22 @ 21:00)  RR: 26 (01-19-22 @ 11:00)  SpO2: 92% (01-19-22 @ 11:00)  Wt(kg): --    01-18-22 @ 07:01  -  01-19-22 @ 07:00  --------------------------------------------------------  IN: 1151.2 mL / OUT: 4765 mL / NET: -3613.8 mL    01-19-22 @ 07:01  -  01-19-22 @ 11:48  --------------------------------------------------------  IN: 328.4 mL / OUT: 390 mL / NET: -61.6 mL        PHYSICAL EXAM:    GEN: Awake, comfortable. NAD.   HEENT: NCAT, PERRL, EOMI. Mucosa moist.   NECK: Supple. (+) RIJ introducer + PA catheter  RESP: CTA b/l  CV: tachycardic, RR, normal s1/s2. No m/r/g.  ABD: Soft, NTND. BS+  EXT: Warm. No edema, clubbing, or cyanosis.  NEURO: AAOx3. No focal deficits.  PA CATHETER READINGS:  RAP - 8, PA - 30/12, PCWP - 11    MEDICATIONS  (STANDING):  chlorhexidine 2% Cloths 1 Application(s) Topical <User Schedule>  colchicine 0.6 milliGRAM(s) Oral every 12 hours  dextrose 40% Gel 15 Gram(s) Oral once  dextrose 5%. 1000 milliLiter(s) (50 mL/Hr) IV Continuous <Continuous>  dextrose 5%. 1000 milliLiter(s) (100 mL/Hr) IV Continuous <Continuous>  dextrose 50% Injectable 25 Gram(s) IV Push once  dextrose 50% Injectable 12.5 Gram(s) IV Push once  dextrose 50% Injectable 25 Gram(s) IV Push once  furosemide Infusion 5 mG/Hr (2.5 mL/Hr) IV Continuous <Continuous>  glucagon  Injectable 1 milliGRAM(s) IntraMuscular once  influenza   Vaccine 0.5 milliLiter(s) IntraMuscular once  iron sucrose IVPB 300 milliGRAM(s) IV Intermittent every 24 hours  milrinone Infusion 0.25 MICROgram(s)/kG/Min (7.4 mL/Hr) IV Continuous <Continuous>  nitroprusside Infusion 1.5 MICROgram(s)/kG/Min (22.2 mL/Hr) IV Continuous <Continuous>  pantoprazole    Tablet 40 milliGRAM(s) Oral before breakfast  spironolactone 25 milliGRAM(s) Oral every 24 hours  valsartan 20 milliGRAM(s) Oral every 12 hours    MEDICATIONS  (PRN):  acetaminophen     Tablet .. 650 milliGRAM(s) Oral every 6 hours PRN Mild Pain (1 - 3)  aluminum hydroxide/magnesium hydroxide/simethicone Suspension 30 milliLiter(s) Oral every 4 hours PRN Dyspepsia      Allergies    No Known Allergies    Intolerances        LABS:                        10.4   10.71 )-----------( 315      ( 19 Jan 2022 05:57 )             32.4     01-19    128<L>  |  90<L>  |  10  ----------------------------<  110<H>  4.2   |  25  |  0.63    Ca    9.0      19 Jan 2022 05:57  Phos  4.9     01-19  Mg     2.4     01-19    TPro  7.1  /  Alb  3.8  /  TBili  0.5  /  DBili  x   /  AST  36  /  ALT  87<H>  /  AlkPhos  179<H>  01-19    PTT - ( 18 Jan 2022 16:23 )  PTT:29.3 sec    CARDIAC MARKERS ( 18 Jan 2022 05:40 )  x     / 0.93 ng/mL / 34 U/L / x     / 2.5 ng/mL      ECHO:  < from: TTE Echo Complete w/o Contrast w/ Doppler (01.14.22 @ 11:35) >  ---------------------------------------------------------------------------  -----  CONCLUSIONS:     1. Patient was tachycardic during the study.   2. No significant valvular disease.   3. The right ventricle is normal in size. Right ventricular systolic   function is probably normal.   4. Left ventricular systolic function is severely reduced with a   calculated ejection fraction of 15% with regional wall motion   abnormalities. Relatively preserved basal wall motion. Remaining segments   are severely hypokinetic. Consider stress induced cardiomyopathy.   5. Trivial pericardial effusion.    ---------------------------------------------------------------------------  -----    < end of copied text >  < from: Cardiac Catheterization (01.14.22 @ 16:58) >  Conclusions:         - RHCon 1 mcg of Nipride     - RA 17     - RV 49/16     - PA 45/33/38     - PCW 35     - PA Sat 27     - Ao - 96%         RHC on Niprode 2 mcg and IABP     - PCW 23     - PA 28/17/23     - PA Sat 33     < end of copied text >  < from: Cardiac Catheterization (01.14.22 @ 16:58) >  General Diagnostic Impressions     There is no angiographic evidence for coronary artery disease.      < end of copied text >

## 2022-01-19 NOTE — PROGRESS NOTE ADULT - ATTENDING COMMENTS
36F, obese, no significant PMH w/ recent [place] [facility] visit ~2weeks for PNA, tested positive for Covid, sent home on oral abx. Subsequently presents back to [place] on 1/9 w/ nausea, abdominal discomfort, SOB -> found to have acute systolic CHF 2/2 Myocarditis c/b cardiogenic shock, tx'd to [facility]U for further mgmt    -sCHF - acute systolic CHF 2/2 Myocarditis(recent Covid infection 2wks ago, c/b cardiogenic shock s/p L/RHC - no obstructive CAD; RHC w/ severely elevated R/L filling pressures and low cardiac indices c/w cardiogenic shock - s/p IABP placement on 1/14; Now s/p IABP wean and removal yesterday 1/18. Additionally Nipride gtt was weaned off today ~1PM. She is urrently on Milrinone 0.25mcg, Lasix gtt, now reduced to 2.5mg/hr as patient continues to diurese very well and Spironolactone 25mg daily, Also started on Valsartan 20 BID. AM Hemodynamics CO/CI(on Nipride/Milrinone and Valsartan) 7.1/3.4 and . Plan to repeat Hemodynamics this afternoon now off Nipride gtt; Advanced HF following, appreciate recs  -Pulm - AHRF 2/2 pulmonary edema i/s/o acute systolic CHF. Pt. has responded very well to diuresis(Net negative 15L since admission) and subjectively feels better. However, requiring 6L NC this AM w/ O2 Sat ~91-94%, this has improved w/ Incentive Spirometer but will obtain CTA Chest to r/o PE; c/w IV diuresis  -DASH diet  -DVT PPx  -Dispo: CCU  -Full Code    Sebastián Humphries MD  CCU Attending

## 2022-01-19 NOTE — PROGRESS NOTE ADULT - SUBJECTIVE AND OBJECTIVE BOX
OVERNIGHT EVENTS: 8pm labs showed lactate increased from 1.9 to 2.4. Krishna: CO 7.5, CI 3.6, SV 67. No signs of bleeding from right groin. 6am lactate 1.1 (decreased from 2.4). Krishna CO 7.1, CI 3.4, . Pt complaining of chest pain, repeat EKG, no ischemic changes compared to prior. Gave aspirin 325mg    SUBJECTIVE / INTERVAL HPI: Patient seen and examined at bedside. Reports she is feeling much better today. Reports continued chest pain however improved and not associated with inspiration. Denies orthopnea or PND. Poor PO intake- reports she did not eat dinner last night due to poor appetite and anxiety after having her balloon pump pulled. Reports her breathing is ok. LBM yesterday. Patient denying SOB, palpitations. Patient denies fever, chills, N/V, abdominal pain, diarrhea, constipation.  Remaining ROS negative       PHYSICAL EXAM:    General: resting comfortably in bed in NAD  HEENT: NC/AT; PERRL, anicteric sclera; MMM  Neck: supple, no JVD. swan in right IJ.  Cardiovascular: +S1/S2, tachycardic rate, regular rhythm, no murmurs   Respiratory: CTA B/L; no W/R/R. satting well on 6LNC. no increased WOB.   Gastrointestinal: soft, NT/ND; +BSx4  Extremities: WWP; no edema, clubbing or cyanosis. Groin site from balloon pump insertion without active bleeding or hematoma.   Vascular: 2+ radial, DP pulses B/L  Neurological: AAOx3; no focal deficits  Dermatologic: no appreciable wounds or damage to the skin    VITAL SIGNS:  Vital Signs Last 24 Hrs  T(C): 36.9 (19 Jan 2022 12:00), Max: 37 (18 Jan 2022 17:40)  T(F): 98.5 (19 Jan 2022 12:00), Max: 98.6 (18 Jan 2022 17:40)  HR: 112 (19 Jan 2022 12:00) (104 - 136)  BP: 99/64 (18 Jan 2022 21:00) (84/53 - 108/73)  BP(mean): 73 (18 Jan 2022 21:00) (63 - 85)  RR: 22 (19 Jan 2022 12:00) (16 - 35)  SpO2: 97% (19 Jan 2022 12:00) (87% - 98%)      MEDICATIONS:  MEDICATIONS  (STANDING):  chlorhexidine 2% Cloths 1 Application(s) Topical <User Schedule>  colchicine 0.6 milliGRAM(s) Oral every 12 hours  dextrose 40% Gel 15 Gram(s) Oral once  dextrose 5%. 1000 milliLiter(s) (50 mL/Hr) IV Continuous <Continuous>  dextrose 5%. 1000 milliLiter(s) (100 mL/Hr) IV Continuous <Continuous>  dextrose 50% Injectable 25 Gram(s) IV Push once  dextrose 50% Injectable 12.5 Gram(s) IV Push once  dextrose 50% Injectable 25 Gram(s) IV Push once  furosemide Infusion 2.5 mG/Hr (1.25 mL/Hr) IV Continuous <Continuous>  glucagon  Injectable 1 milliGRAM(s) IntraMuscular once  influenza   Vaccine 0.5 milliLiter(s) IntraMuscular once  iron sucrose IVPB 300 milliGRAM(s) IV Intermittent every 24 hours  milrinone Infusion 0.25 MICROgram(s)/kG/Min (7.4 mL/Hr) IV Continuous <Continuous>  nitroprusside Infusion 1.5 MICROgram(s)/kG/Min (22.2 mL/Hr) IV Continuous <Continuous>  pantoprazole    Tablet 40 milliGRAM(s) Oral before breakfast  spironolactone 25 milliGRAM(s) Oral every 24 hours  valsartan 20 milliGRAM(s) Oral every 12 hours    MEDICATIONS  (PRN):  acetaminophen     Tablet .. 650 milliGRAM(s) Oral every 6 hours PRN Mild Pain (1 - 3)  aluminum hydroxide/magnesium hydroxide/simethicone Suspension 30 milliLiter(s) Oral every 4 hours PRN Dyspepsia      ALLERGIES:  Allergies    No Known Allergies    Intolerances        LABS:                        10.4   10.71 )-----------( 315      ( 19 Jan 2022 05:57 )             32.4     01-19    128<L>  |  90<L>  |  10  ----------------------------<  110<H>  4.2   |  25  |  0.63    Ca    9.0      19 Jan 2022 05:57  Phos  4.9     01-19  Mg     2.4     01-19    TPro  7.1  /  Alb  3.8  /  TBili  0.5  /  DBili  x   /  AST  36  /  ALT  87<H>  /  AlkPhos  179<H>  01-19    PTT - ( 18 Jan 2022 16:23 )  PTT:29.3 sec    CAPILLARY BLOOD GLUCOSE          RADIOLOGY & ADDITIONAL TESTS: Reviewed.

## 2022-01-19 NOTE — PROGRESS NOTE ADULT - PROBLEM SELECTOR PLAN 4
likely 2/2 cardiogenic shock; improving  - continue to monitor. Pt w/normocytic anemia w/low normal iron levels & high normal TIBC  - start IV sucrose 200mg q24h x 5 days

## 2022-01-19 NOTE — PROGRESS NOTE ADULT - PROBLEM SELECTOR PLAN 1
Improving. Warm and wet on exam. Primary team weaning patient from IABP; currently on 1:3. Bedside measurements still show elevated filling pressures with CVP - 9, PA - 40/20, PCWP - 20. CI this AM calculated at 3.3.  - agree with IABP wean. Can consider removal tomorrow if patient remains stable  - start milrinone 0.250 mcg/kg/min   - repeat hemodynamics & labs ~4 hrs post milrinone initiation  - agree with lasix gtt @ 5mg/hr  - would repeat limited TTE to assess for improvement in LVEF  - start valsartan 20mg bid; can wean nitroprusside if needed  - c/w Spironolactone 25mg PO QD Improving. Filling pressures improved on bedside PA catheter measurements w/PCWP of 11, RAP - 8.  CI this AM calculated at 3.4.  - c/w milrinone 0.250 mcg/kg/min   - decrease lasix gtt to 2.5mg/hr  - would repeat limited TTE to assess for improvement in LVEF  - c/w valsartan 20mg bid  - D/C nitroprusside gtt  - c/w Spironolactone 25mg PO QD

## 2022-01-19 NOTE — PROGRESS NOTE ADULT - ASSESSMENT
Ms Harrell is a 35 yo F w/ no PMHx admitted to [place] for perimyocarditis and then transferred to [facility] for further work up and management of cardiogenic shock.    Neuro  AAOx3, No active issues    Cardiac  #Acute HFrEF 2/2 perimyocarditis  Lactate now cleared, LFTs downtrending, Cr wnl. 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 balloon pump placed  -right IJ with indwelling [place] cath for therapeutic monitoring  -HF following , recs appreciated   -plan to wean balloon pump, turned to 1:3 this morning  -start milrinone drip 0.25 mcg/kg/min as bridge to wean balloon pump ----- will continue to monitor hemodynamics to guide management however low threshold to keep pump in for now. if cardiac output preserved on 1:3 will pull pump  -c/w heparin drip while balloon pump in  -tachycardia much improved today  -c/w lasix drip for diuresis; monitor BPs and I&O's; goal negative 1-2 L daily and goal CVP 6-8  -c/w spironolactone 25mg daily   -c/w Nipride gtt for afterload reduction (Goal MAP >65)   -start valsartan 20mg BID; if pressures drop too much will d/c nipride drip. plan to eventually transition to PO afterload reduction and wean off nipride drip  -holding beta blocker for now given hypotension with reflex tachy  -strict I/Os  -cardiac enzymes downtrended, no need to continue to trend  -c/w HFNC 40/40 at night and bed tilting since desaturation lying flat. can place on nasal cannula during the day for patient comfort   -plan for cardiac MRI this week when patient medically optimized to tolerate   -plan to repeat TTE tomorrow to assess for improvement in LVEF    #Perimyocarditis   Patient presenting with pleuritic chest pain with associated myalgias, chills, cough, fever, and generalized fatigue  , CKMB 84.8, Trop T 5.83 on admission; continue to trend daily. 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 for recent infection   -CMV and EBV Abs indicating past infections   -f/u MILAN, scleroderma, rheumatoid factor, IgG subsets, anti-mitochondrial ab, anti-dsDNA ab  -Urine drug screen negative  -TSH wnl  -c/w colchicine 0.6mg BID  -plan for cardiac MRI once patient medically stable     Pulm  -no active issues    Renal  #Hyponatremia  Patient persistently hyponatremic since admission; Na improving to 131 today  -patient asymptomatic ; likely hypervolemic hyponatremia 2/2 to cardiogenic shock  -Continue to monitor BMP  -c/w lasix drip      GI  #Transaminitis   Patient with ALP/ALT remaining elevated; likely 2/2 congestive hepatopathy versus ischemia i/s/o cardiogenic shock  -abd US from OSH showing enlarged heterogenous liver   -continue to monitor  -hepatitis panel negative  -f/u abdominal ultrasound    Heme-Onc  #Anemia , normocytic   Hbg slowly uptrending. No hx of anemia. Patient was menstruating on admission and endorses menometrorrhagia. No other signs of bleeding.   - iron studies wnl  - trend CBC  - maintain active T&S  - transfuse for Hgb <7     Endocrine  No active issues    ID  No active issues    Other  F: none  E: replete K<4, Mg<2  N: DASH/TLC diet with ensure     VTE Prophylaxis: heparin   GI: Pantoprazole 40mg PO daily  C: Full Code  D: CCU     Ms Harrell is a 37 yo F w/ no PMHx admitted to [place] for perimyocarditis and then transferred to [facility] for further work up and management of cardiogenic shock.    Neuro  AAOx3, No active issues    Cardiac  #Acute HFrEF 2/2 perimyocarditis  Lactate now cleared, LFTs downtrending, Cr wnl. 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 balloon pump placed 1/14; removed 1/18 and transitioned to milrinone as cardiac output improved   -right IJ with indwelling [place] cath for therapeutic monitoring  -HF following , recs appreciated   -c/w milrinone drip 0.25 mcg/kg/min   -c/w lasix drip for diuresis, wean to 2.5 today; monitor BPs and I&O's; goal negative 1-2 L daily and goal CVP 6-8  -c/w spironolactone 25mg daily    -c/w valsartan 20mg BID for afterload reduction   -wean off nipride drip today   -holding beta blocker given hypotension with reflex tachycardia  -strict I/Os  -c/w HFNC 40/40 at night and bed tilting since desaturation lying flat. 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  Pt w/supplemental O2 requirements out of proportion to her heart failure exacerbation as she has been diuresing well (around 11L off) since admission  -given immobility since admission, r/o PE (though she had been on anticoagulation while the IABP was present) - f/u CTA PE  - incentive spirometry   - wean O2 as tolerated; currently low 90's on 6LNC    Renal  #Hyponatremia  Patient persistently hyponatremic since admission; Na 128 today  -patient asymptomatic  -can no longer attribute to hypervolemia as patient as been diuresing well since admission; likely added component of poor PO intake   -Continue to monitor BMP  -c/w lasix drip      GI  #Transaminitis   Patient with ALP/ALT remaining elevated; likely 2/2 congestive hepatopathy versus ischemia i/s/o cardiogenic shock  -abd US from OSH showing enlarged heterogenous liver   -continue to monitor  -hepatitis panel negative  -f/u abdominal ultrasound    Heme-Onc  #Anemia , normocytic   Hbg slowly uptrending. No hx of anemia. Patient was menstruating on admission and endorses menometrorrhagia. No other signs of bleeding.   - iron studies wnl  - trend CBC  - maintain active T&S  - transfuse for Hgb <7   -IV iron sucrose 300mg x3 days     Endocrine  No active issues    ID  No active issues    Other  F: none  E: replete K<4, Mg<2  N: DASH/TLC diet with ensure     VTE Prophylaxis: off heparin drip; start ppx tomorrow given IABP recently pulled    GI: Pantoprazole 40mg PO daily  C: Full Code  D: CCU

## 2022-01-19 NOTE — PROGRESS NOTE ADULT - ASSESSMENT
36F w/hx of anemia transferred from [facility] on 1/13/22 with myopericarditis complicated by cardiogenic shock.

## 2022-01-20 LAB
ALBUMIN SERPL ELPH-MCNC: 3.7 G/DL — SIGNIFICANT CHANGE UP (ref 3.3–5)
ALP SERPL-CCNC: 162 U/L — HIGH (ref 40–120)
ALT FLD-CCNC: 75 U/L — HIGH (ref 10–45)
ANION GAP SERPL CALC-SCNC: 13 MMOL/L — SIGNIFICANT CHANGE UP (ref 5–17)
AST SERPL-CCNC: 31 U/L — SIGNIFICANT CHANGE UP (ref 10–40)
BASE EXCESS BLDA CALC-SCNC: 4.9 MMOL/L — HIGH (ref -2–3)
BASE EXCESS BLDMV CALC-SCNC: 4.2 MMOL/L — SIGNIFICANT CHANGE UP
BASOPHILS # BLD AUTO: 0.06 K/UL — SIGNIFICANT CHANGE UP (ref 0–0.2)
BASOPHILS NFR BLD AUTO: 0.6 % — SIGNIFICANT CHANGE UP (ref 0–2)
BILIRUB SERPL-MCNC: 0.4 MG/DL — SIGNIFICANT CHANGE UP (ref 0.2–1.2)
BUN SERPL-MCNC: 13 MG/DL — SIGNIFICANT CHANGE UP (ref 7–23)
CALCIUM SERPL-MCNC: 9.2 MG/DL — SIGNIFICANT CHANGE UP (ref 8.4–10.5)
CHLORIDE SERPL-SCNC: 93 MMOL/L — LOW (ref 96–108)
CO2 BLDA-SCNC: 29 MMOL/L — HIGH (ref 19–24)
CO2 BLDMV-SCNC: 29.6 MMOL/L — SIGNIFICANT CHANGE UP
CO2 SERPL-SCNC: 26 MMOL/L — SIGNIFICANT CHANGE UP (ref 22–31)
COHGB MFR BLDMV: 1.8 % — SIGNIFICANT CHANGE UP
CREAT SERPL-MCNC: 0.73 MG/DL — SIGNIFICANT CHANGE UP (ref 0.5–1.3)
EOSINOPHIL # BLD AUTO: 0.36 K/UL — SIGNIFICANT CHANGE UP (ref 0–0.5)
EOSINOPHIL NFR BLD AUTO: 3.6 % — SIGNIFICANT CHANGE UP (ref 0–6)
GAS PNL BLDA: SIGNIFICANT CHANGE UP
GAS PNL BLDMV: SIGNIFICANT CHANGE UP
GLUCOSE SERPL-MCNC: 101 MG/DL — HIGH (ref 70–99)
HCO3 BLDA-SCNC: 28 MMOL/L — SIGNIFICANT CHANGE UP (ref 21–28)
HCO3 BLDMV-SCNC: 28 MMOL/L — SIGNIFICANT CHANGE UP
HCT VFR BLD CALC: 35.9 % — SIGNIFICANT CHANGE UP (ref 34.5–45)
HGB BLD-MCNC: 11.5 G/DL — SIGNIFICANT CHANGE UP (ref 11.5–15.5)
HGB FLD-MCNC: 11.7 G/DL — SIGNIFICANT CHANGE UP (ref 11.7–16.1)
IMM GRANULOCYTES NFR BLD AUTO: 1 % — SIGNIFICANT CHANGE UP (ref 0–1.5)
LACTATE SERPL-SCNC: 0.8 MMOL/L — SIGNIFICANT CHANGE UP (ref 0.5–2)
LYMPHOCYTES # BLD AUTO: 1.24 K/UL — SIGNIFICANT CHANGE UP (ref 1–3.3)
LYMPHOCYTES # BLD AUTO: 12.2 % — LOW (ref 13–44)
MAGNESIUM SERPL-MCNC: 2.5 MG/DL — SIGNIFICANT CHANGE UP (ref 1.6–2.6)
MCHC RBC-ENTMCNC: 29 PG — SIGNIFICANT CHANGE UP (ref 27–34)
MCHC RBC-ENTMCNC: 32 GM/DL — SIGNIFICANT CHANGE UP (ref 32–36)
MCV RBC AUTO: 90.7 FL — SIGNIFICANT CHANGE UP (ref 80–100)
METHGB MFR BLDMV: 0.5 % — SIGNIFICANT CHANGE UP
MONOCYTES # BLD AUTO: 0.68 K/UL — SIGNIFICANT CHANGE UP (ref 0–0.9)
MONOCYTES NFR BLD AUTO: 6.7 % — SIGNIFICANT CHANGE UP (ref 2–14)
NEUTROPHILS # BLD AUTO: 7.7 K/UL — HIGH (ref 1.8–7.4)
NEUTROPHILS NFR BLD AUTO: 75.9 % — SIGNIFICANT CHANGE UP (ref 43–77)
NRBC # BLD: 0 /100 WBCS — SIGNIFICANT CHANGE UP (ref 0–0)
O2 CT VFR BLD CALC: 43 MMHG — SIGNIFICANT CHANGE UP
OXYHGB MFR BLDMV: 71.4 % — SIGNIFICANT CHANGE UP
PCO2 BLDA: 35 MMHG — SIGNIFICANT CHANGE UP (ref 32–35)
PCO2 BLDMV: 40 MMHG — SIGNIFICANT CHANGE UP
PH BLDA: 7.51 — HIGH (ref 7.35–7.45)
PH BLDMV: 7.46 — SIGNIFICANT CHANGE UP
PHOSPHATE SERPL-MCNC: 3.7 MG/DL — SIGNIFICANT CHANGE UP (ref 2.5–4.5)
PLATELET # BLD AUTO: 312 K/UL — SIGNIFICANT CHANGE UP (ref 150–400)
PO2 BLDA: 84 MMHG — SIGNIFICANT CHANGE UP (ref 83–108)
POTASSIUM SERPL-MCNC: 3.7 MMOL/L — SIGNIFICANT CHANGE UP (ref 3.5–5.3)
POTASSIUM SERPL-SCNC: 3.7 MMOL/L — SIGNIFICANT CHANGE UP (ref 3.5–5.3)
PROT SERPL-MCNC: 7 G/DL — SIGNIFICANT CHANGE UP (ref 6–8.3)
RBC # BLD: 3.96 M/UL — SIGNIFICANT CHANGE UP (ref 3.8–5.2)
RBC # FLD: 17.1 % — HIGH (ref 10.3–14.5)
SAO2 % BLDA: 97.4 % — SIGNIFICANT CHANGE UP (ref 94–98)
SAO2 % BLDMV: 73 % — SIGNIFICANT CHANGE UP
SARS-COV-2 RNA SPEC QL NAA+PROBE: SIGNIFICANT CHANGE UP
SODIUM SERPL-SCNC: 132 MMOL/L — LOW (ref 135–145)
WBC # BLD: 10.14 K/UL — SIGNIFICANT CHANGE UP (ref 3.8–10.5)
WBC # FLD AUTO: 10.14 K/UL — SIGNIFICANT CHANGE UP (ref 3.8–10.5)

## 2022-01-20 PROCEDURE — 99233 SBSQ HOSP IP/OBS HIGH 50: CPT

## 2022-01-20 PROCEDURE — 71045 X-RAY EXAM CHEST 1 VIEW: CPT | Mod: 26

## 2022-01-20 PROCEDURE — 99291 CRITICAL CARE FIRST HOUR: CPT

## 2022-01-20 RX ORDER — POTASSIUM CHLORIDE 20 MEQ
40 PACKET (EA) ORAL ONCE
Refills: 0 | Status: COMPLETED | OUTPATIENT
Start: 2022-01-20 | End: 2022-01-20

## 2022-01-20 RX ORDER — POTASSIUM CHLORIDE 20 MEQ
20 PACKET (EA) ORAL
Refills: 0 | Status: COMPLETED | OUTPATIENT
Start: 2022-01-20 | End: 2022-01-20

## 2022-01-20 RX ORDER — MILRINONE LACTATE 1 MG/ML
0.12 INJECTION, SOLUTION INTRAVENOUS
Qty: 20 | Refills: 0 | Status: DISCONTINUED | OUTPATIENT
Start: 2022-01-20 | End: 2022-01-21

## 2022-01-20 RX ADMIN — Medication 50 MILLIEQUIVALENT(S): at 09:04

## 2022-01-20 RX ADMIN — CHLORHEXIDINE GLUCONATE 1 APPLICATION(S): 213 SOLUTION TOPICAL at 07:14

## 2022-01-20 RX ADMIN — VALSARTAN 20 MILLIGRAM(S): 80 TABLET ORAL at 12:10

## 2022-01-20 RX ADMIN — MILRINONE LACTATE 3.7 MICROGRAM(S)/KG/MIN: 1 INJECTION, SOLUTION INTRAVENOUS at 16:19

## 2022-01-20 RX ADMIN — MILRINONE LACTATE 3.7 MICROGRAM(S)/KG/MIN: 1 INJECTION, SOLUTION INTRAVENOUS at 12:26

## 2022-01-20 RX ADMIN — MILRINONE LACTATE 7.4 MICROGRAM(S)/KG/MIN: 1 INJECTION, SOLUTION INTRAVENOUS at 01:28

## 2022-01-20 RX ADMIN — IRON SUCROSE 176.67 MILLIGRAM(S): 20 INJECTION, SOLUTION INTRAVENOUS at 12:11

## 2022-01-20 RX ADMIN — VALSARTAN 20 MILLIGRAM(S): 80 TABLET ORAL at 22:09

## 2022-01-20 RX ADMIN — Medication 0.6 MILLIGRAM(S): at 09:04

## 2022-01-20 RX ADMIN — Medication 0.6 MILLIGRAM(S): at 22:08

## 2022-01-20 RX ADMIN — PANTOPRAZOLE SODIUM 40 MILLIGRAM(S): 20 TABLET, DELAYED RELEASE ORAL at 07:14

## 2022-01-20 RX ADMIN — ENOXAPARIN SODIUM 40 MILLIGRAM(S): 100 INJECTION SUBCUTANEOUS at 07:13

## 2022-01-20 RX ADMIN — Medication 50 MILLIEQUIVALENT(S): at 07:51

## 2022-01-20 RX ADMIN — SPIRONOLACTONE 25 MILLIGRAM(S): 25 TABLET, FILM COATED ORAL at 07:14

## 2022-01-20 RX ADMIN — ENOXAPARIN SODIUM 40 MILLIGRAM(S): 100 INJECTION SUBCUTANEOUS at 22:08

## 2022-01-20 NOTE — PROGRESS NOTE ADULT - ATTENDING COMMENTS
36F, obese, no significant PMH w/ recent [place] ER visit ~2weeks for PNA, tested positive for Covid, sent home on oral abx. Subsequently presents back to [place] ER on 1/9 w/ nausea, abdominal discomfort, SOB -> found to have acute systolic CHF 2/2 Myocarditis c/b cardiogenic shock, tx'd to [facility] CCU for further mgmt    -sCHF - acute systolic CHF 2/2 Myocarditis(recent Covid infection 2wks ago, c/b cardiogenic shock s/p L/RHC - no obstructive CAD; RHC w/ severely elevated R/L filling pressures and low cardiac indices c/w cardiogenic shock - s/p IABP placement on 1/14 - 1/18. Additionally Off Nipride gtt since 1/18. She is currently on Milrinone 0.25mcg, Lasix gtt@ 2.5mg/hr, Valsartan 20 BID and Spironolactone 25mg daily; AM Hemodynamics CO/CI(on Milrinone and Valsartan) 6.8/3.3 and SVR 700s. PA Catheter dc'd; Plan to decrease Milrinone to 0.125mcg, c/w Valsartan BID and d/c Lasix gtt. Consider low dose BB in the next 24-48hrs; Obtain cMRI; Advanced HF following, appreciate recs  -Pulm - AHRF 2/2 pulmonary edema i/s/o acute systolic CHF. s/p CTA Chest - no e/o PE; Oxygenation improved w/ Incentive spirometry  -OOB to chair / PT  -DASH diet  -DVT PPx  -Dispo: CCU  -Full Code    Sebastián Humphries MD  CCU Attending

## 2022-01-20 NOTE — PROGRESS NOTE ADULT - PROBLEM SELECTOR PLAN 1
Improving. Warm on exam, appears euvolemic.  - cut milrinone to 0.125 mcg/kg/min   - can hold diuresis at this time; D/C Lasix gtt  - c/w valsartan 20mg bid  - c/w Spironolactone 25mg PO QD

## 2022-01-20 NOTE — PROGRESS NOTE ADULT - ASSESSMENT
Ms Harrell is a 35 yo F w/ no PMHx admitted to [place] for perimyocarditis and then transferred to [facility] for further work up and management of cardiogenic shock.    Neuro  AAOx3, No active issues    Cardiac  #Acute HFrEF 2/2 perimyocarditis  Lactate now cleared, LFTs downtrending, Cr wnl. 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 balloon pump placed 1/14; removed 1/18 and transitioned to milrinone as cardiac output improved   -swan pulled today as patient with good cardiac output  -HF following , recs appreciated   -decrease milrinone drip to 0.125 mcg/kg/min ; plan to wean off tomorrow   -d/c lasix drip   -c/w spironolactone 25mg daily    -c/w valsartan 20mg BID for afterload reduction ; plan to transition to entresto tomorrow   -wean off nipride drip today   -holding beta blocker given hypotension with reflex tachycardia  -strict I/Os  -cardiac MRI  -repeat TTE 1/19 showing severely reduced left ventricular systolic function with EF 15-20% and akinetic apical septal segment, apical lateral segment, and apex. 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 as covid abs positive for recent infection   -c/w colchicine 0.6mg BID  -f/u cardiac MRI    -c/w HF management as above     Pulm  -patient satting well on RA without increased WOB  -c/w incentive spirometry for atelectasis   -CTA negative for PE    Renal  #Hyponatremia  Patient persistently hyponatremic since admission; slowly uptrending as patient increasing PO intake   -patient asymptomatic  -can no longer attribute to hypervolemia as patient as been diuresing well since admission; likely added component of poor PO intake   -Continue to monitor BMP     GI  #Transaminitis   Patient with ALP/ALT remaining elevated; likely 2/2 congestive hepatopathy versus ischemia i/s/o cardiogenic shock  -abd US with 2 hypoechoic liver lesions c/w hemangiomas; f/u outpatient    -continue to monitor  -hepatitis panel negative    Heme-Onc  -no active issues      Endocrine  No active issues    ID  No active issues    Other  F: none  E: replete K<4, Mg<2  N: DASH/TLC diet with ensure     VTE Prophylaxis: holding for now  GI: Pantoprazole 40mg PO daily  C: Full Code  D: CCU     Ms Harrell is a 35 yo F w/ no PMHx admitted to [place] for perimyocarditis and then transferred to [facility] for further work up and management of cardiogenic shock.    Neuro  AAOx3, No active issues    Cardiac  #Acute HFrEF 2/2 perimyocarditis  Lactate now cleared, LFTs downtrending, Cr wnl. 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 balloon pump placed 1/14; removed 1/18 and transitioned to milrinone as cardiac output improved   -swan pulled today as patient with good cardiac output  -HF following , recs appreciated   -decrease milrinone drip to 0.125 mcg/kg/min ; plan to wean off tomorrow   -d/c lasix drip as patient euvolemic   -c/w spironolactone 25mg daily    -c/w valsartan 20mg BID for afterload reduction ; plan to transition to entresto tomorrow   -wean off nipride drip today   -holding beta blocker given hypotension with reflex tachycardia  -strict I/Os  -cardiac MRI  -repeat TTE 1/19 showing severely reduced left ventricular systolic function with EF 15-20% and akinetic apical septal segment, apical lateral segment, and apex. 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 as covid abs positive for recent infection   -c/w colchicine 0.6mg BID  -f/u cardiac MRI    -c/w HF management as above     Pulm  -patient satting well on RA without increased WOB  -c/w incentive spirometry for atelectasis   -CTA negative for PE    Renal  #Hyponatremia  Patient persistently hyponatremic since admission; slowly uptrending as patient increasing PO intake   -patient asymptomatic  -can no longer attribute to hypervolemia as patient as been diuresing well since admission; likely added component of poor PO intake   -Continue to monitor BMP     GI  #Transaminitis   Patient with ALP/ALT remaining elevated; likely 2/2 congestive hepatopathy versus ischemia i/s/o cardiogenic shock  -abd US with 2 hypoechoic liver lesions c/w hemangiomas; f/u outpatient    -continue to monitor  -hepatitis panel negative    Heme-Onc  -no active issues      Endocrine  No active issues    ID  No active issues    Other  F: none  E: replete K<4, Mg<2  N: DASH/TLC diet with ensure     VTE Prophylaxis: holding for now  GI: Pantoprazole 40mg PO daily  C: Full Code  D: CCU     Ms Harrell is a 35 yo F w/ no PMHx admitted to [place] for perimyocarditis and then transferred to [facility] for further work up and management of cardiogenic shock.    Neuro  AAOx3, No active issues    Cardiac  #Acute HFrEF 2/2 perimyocarditis  Lactate now cleared, LFTs downtrending, Cr wnl. 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 balloon pump placed 1/14; removed 1/18 and transitioned to milrinone as cardiac output improved   -swan pulled today as patient with good cardiac output  -HF following , recs appreciated   -decrease milrinone drip to 0.125 mcg/kg/min ; plan to wean off tomorrow   -d/c lasix drip as patient euvolemic   -c/w spironolactone 25mg daily    -c/w valsartan 20mg BID for afterload reduction ; plan to transition to entresto tomorrow   -wean off nipride drip today   -holding beta blocker given hypotension with reflex tachycardia  -strict I/Os  -cardiac MRI  -repeat TTE 1/19 showing severely reduced left ventricular systolic function with EF 15-20% and akinetic apical septal segment, apical lateral segment, and apex. 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 as covid abs positive for recent infection   -c/w colchicine 0.6mg BID  -f/u cardiac MRI    -c/w HF management as above     Pulm  -patient satting well on RA without increased WOB  -c/w incentive spirometry for atelectasis   -CTA negative for PE    Renal  #Hyponatremia  Patient persistently hyponatremic since admission; slowly uptrending as patient increasing PO intake   -patient asymptomatic  -can no longer attribute to hypervolemia as patient as been diuresing well since admission; likely added component of poor PO intake   -Continue to monitor BMP     GI  #Transaminitis   Patient with ALP/ALT remaining elevated; likely 2/2 congestive hepatopathy versus ischemia i/s/o cardiogenic shock  -abd US with 2 hypoechoic liver lesions c/w hemangiomas; f/u outpatient    -continue to monitor  -hepatitis panel negative    Heme-Onc  -no active issues      Endocrine  No active issues    ID  No active issues    Other  F: none  E: replete K<4, Mg<2  N: DASH/TLC diet with ensure     VTE Prophylaxis: holding for now  GI: Pantoprazole 40mg PO daily  C: Full Code  D: CCU    lines: right radial A line Ms Harrell is a 35 yo F w/ no PMHx admitted to [place] for perimyocarditis and then transferred to [facility] for further work up and management of cardiogenic shock.    Neuro  AAOx3, No active issues    Cardiac  #Acute HFrEF 2/2 perimyocarditis  Lactate now cleared, LFTs downtrending, Cr wnl. 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 balloon pump placed 1/14; removed 1/18 and transitioned to milrinone as cardiac output improved   -swan pulled today as patient with good cardiac output  -HF following , recs appreciated   -decrease milrinone drip to 0.125 mcg/kg/min ; plan to wean off tomorrow   -d/c lasix drip as patient euvolemic   -c/w spironolactone 25mg daily    -c/w valsartan 20mg BID for afterload reduction ; plan to transition to entresto tomorrow   -wean off nipride drip today   -holding beta blocker given hypotension with reflex tachycardia  -strict I/Os  -cardiac MRI  -repeat TTE 1/19 showing severely reduced left ventricular systolic function with EF 15-20% and akinetic apical septal segment, apical lateral segment, and apex. 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 as covid abs positive for recent infection   -c/w colchicine 0.6mg BID  -f/u cardiac MRI    -c/w HF management as above     Pulm  -patient satting well on RA without increased WOB  -c/w incentive spirometry for atelectasis   -CTA negative for PE    Renal  #Hyponatremia  Patient persistently hyponatremic since admission; slowly uptrending as patient increasing PO intake   -patient asymptomatic  -can no longer attribute to hypervolemia as patient as been diuresing well since admission; likely added component of poor PO intake   -Continue to monitor BMP     GI  #Transaminitis   Patient with ALP/ALT remaining elevated; likely 2/2 congestive hepatopathy versus ischemia i/s/o cardiogenic shock  -abd US with 2 hypoechoic liver lesions c/w hemangiomas; f/u outpatient    -continue to monitor  -hepatitis panel negative    Heme-Onc  -no active issues      Endocrine  No active issues    ID  No active issues    Other  F: none  E: replete K<4, Mg<2  N: DASH/TLC diet with ensure     VTE Prophylaxis: lovenox   GI: Pantoprazole 40mg PO daily  C: Full Code  D: CCU    lines: right radial A line

## 2022-01-20 NOTE — PROGRESS NOTE ADULT - SUBJECTIVE AND OBJECTIVE BOX
OVERNIGHT EVENTS: NAEO    SUBJECTIVE / INTERVAL HPI: Patient seen and examined at bedside. Reports she is feeling well this morning and denies any complaints. Says she is feeling much better. Slept all night off oxygen, denies orthopnea overnight. PO intake better- appetite coming back. Patient denying chest pain, SOB, palpitations, cough. Patient denies fever, chills, HA, Dizziness, N/V, abdominal pain, diarrhea, constipation.  Remaining ROS negative   6am Krishna: CO 6.8, CI 3.3,   [place] pulled today    PHYSICAL EXAM:    General: resting comfortably in bed in NAD  HEENT: NC/AT; PERRL, anicteric sclera; MMM  Neck: supple, no JVD. swan in right IJ (subsequently pulled)   Cardiovascular: +S1/S2, tachycardic rate, regular rhythm, no murmurs   Respiratory: CTA B/L; no W/R/R. satting well on room air. no increased WOB.   Gastrointestinal: soft, NT/ND; +BSx4  Extremities: WWP; no edema, clubbing or cyanosis. Groin site from balloon pump insertion without active bleeding or hematoma.   Vascular: 2+ radial, DP pulses B/L  Neurological: AAOx3; no focal deficits  Dermatologic: no appreciable wounds or damage to the skin      VITAL SIGNS:  Vital Signs Last 24 Hrs  T(C): 36.6 (20 Jan 2022 12:00), Max: 37.3 (19 Jan 2022 18:15)  T(F): 97.9 (20 Jan 2022 12:00), Max: 99.1 (19 Jan 2022 18:15)  HR: 106 (20 Jan 2022 14:00) (100 - 120)  BP: 101/59 (19 Jan 2022 23:00) (94/61 - 101/59)  BP(mean): 76 (19 Jan 2022 23:00) (72 - 76)  RR: 19 (20 Jan 2022 14:00) (19 - 39)  SpO2: 92% (20 Jan 2022 14:00) (90% - 98%)      MEDICATIONS:  MEDICATIONS  (STANDING):  chlorhexidine 2% Cloths 1 Application(s) Topical <User Schedule>  colchicine 0.6 milliGRAM(s) Oral every 12 hours  dextrose 40% Gel 15 Gram(s) Oral once  dextrose 5%. 1000 milliLiter(s) (50 mL/Hr) IV Continuous <Continuous>  dextrose 5%. 1000 milliLiter(s) (100 mL/Hr) IV Continuous <Continuous>  dextrose 50% Injectable 25 Gram(s) IV Push once  dextrose 50% Injectable 12.5 Gram(s) IV Push once  dextrose 50% Injectable 25 Gram(s) IV Push once  enoxaparin Injectable 40 milliGRAM(s) SubCutaneous every 12 hours  glucagon  Injectable 1 milliGRAM(s) IntraMuscular once  influenza   Vaccine 0.5 milliLiter(s) IntraMuscular once  iron sucrose IVPB 300 milliGRAM(s) IV Intermittent every 24 hours  milrinone Infusion 0.125 MICROgram(s)/kG/Min (3.7 mL/Hr) IV Continuous <Continuous>  pantoprazole    Tablet 40 milliGRAM(s) Oral before breakfast  spironolactone 25 milliGRAM(s) Oral every 24 hours  valsartan 20 milliGRAM(s) Oral every 12 hours    MEDICATIONS  (PRN):  acetaminophen     Tablet .. 650 milliGRAM(s) Oral every 6 hours PRN Mild Pain (1 - 3)  aluminum hydroxide/magnesium hydroxide/simethicone Suspension 30 milliLiter(s) Oral every 4 hours PRN Dyspepsia      ALLERGIES:  Allergies    No Known Allergies    Intolerances        LABS:                        11.5   10.14 )-----------( 312      ( 20 Jan 2022 05:01 )             35.9     01-20    132<L>  |  93<L>  |  13  ----------------------------<  101<H>  3.7   |  26  |  0.73    Ca    9.2      20 Jan 2022 05:01  Phos  3.7     01-20  Mg     2.5     01-20    TPro  7.0  /  Alb  3.7  /  TBili  0.4  /  DBili  x   /  AST  31  /  ALT  75<H>  /  AlkPhos  162<H>  01-20    PTT - ( 18 Jan 2022 16:23 )  PTT:29.3 sec    CAPILLARY BLOOD GLUCOSE          RADIOLOGY & ADDITIONAL TESTS: Reviewed.

## 2022-01-20 NOTE — PROGRESS NOTE ADULT - ATTENDING COMMENTS
35 YO F with a history of morbid obesity and chronic anemia from menorrhagia who presented to [facility] with days-weeks of worsening chest pain and heart failure symptoms and found to have elevated troponin levels with diffuse nonspecific ST elevations associated with severe LV dysfunction. She had signs of end organ perfusion (lactate 2.4, transaminitis) and underwent R/LHC 1/14 which revealed severely elevated filing pressures and low cardiac output prompting placement of IABP. Her hemodynamics improved and IABP was successfully removed on 1/18. She has adequate cardiac output on inotrope support and remains with severe LV dysfunction though has clinically improved.     Overall, presentation is consistent with cardiogenic shock in setting of likely myocarditis (possibly COVID-19 related). Will wean inotropes and uptitrate GDMT as tolerates     HEMODYNAMICS  1/18 (IABP 1:1, nipride 1.5): RA 9, PA 40/20 (27), PCWP 20, PA sat 68% with [place] CO/CI 7.6/3.7. BP 89/60 with IABP on standby and on nipride.   1/19 (milrinone 0.25, nipride 1.5): RA 8, PA 30/12, PCWP 11, PA sat 71% with [place] CO/CI 7.4/3.6  1/20 (milrinone 0.25): [place] CO/CI 7.3/3.5    REVIEW OF STUDIES  TTE 1/14: LV 4.3 cm, LVEF 15% with relatively preserved basal motion, LVOT VTI 11 cm, grossly normal RV function, no significant valvular abnormalities:     TTE 1/19: LV 4.3 cm, LVEF visually 20-25% with global hypokinesis worse in the apex, LVOT VTI 13 cm, normal RV size/function    COVID PCR: negative. COVID spike and nucleocapsid Ab positive.    PLAN  # Acute systolic heart failure with cardiogenic shock  - Wean milrinone to 0.125 mcg/kg/min   - GDMT: continue valsartan 20 mg BID with plans to eventually switch to Entresto (hopefully tomorrow if BP improved after stopping diuretics). continue spironolactone 25 mg daily. eventual BB when off inotropes.  - Diuretics: stop diuretics    # Myopericarditis  - Reasonable to continue colchicine  - Obtain cardiac MRI     # Hyponatremia  - hypervolemic hyponatremia, improving with diuretics    # Anemia  - iron deficiency anemia likely from menorrhagia   - administer IV sucrose 200 mg x 5 days    # Transaminitis  - improving with hemodynamic support    # Hypoxia  - CTA negative for PE, she is no longer requiring supplemental oxygen

## 2022-01-20 NOTE — PROGRESS NOTE ADULT - SUBJECTIVE AND OBJECTIVE BOX
Detail Level: Zone Detail Level: Generalized Detail Level: Simple INTERVAL HPI/OVERNIGHT EVENTS: Patient seen and examined at bedside. Feels better than yesterday. CTPE (-) for PE. Is using incentive spirometer more.    Home Medications:  Multiple Vitamins oral tablet: 1 tab(s) orally once a day (13 Jan 2022 23:04)      VITAL SIGNS:  T(F): 97.9 (01-20-22 @ 12:00)  HR: 106 (01-20-22 @ 14:00)  BP: 101/59 (01-19-22 @ 23:00)  RR: 19 (01-20-22 @ 14:00)  SpO2: 92% (01-20-22 @ 14:00)  Wt(kg): --    01-19-22 @ 07:01  -  01-20-22 @ 07:00  --------------------------------------------------------  IN: 1074.4 mL / OUT: 4125 mL / NET: -3050.6 mL    01-20-22 @ 07:01  -  01-20-22 @ 14:49  --------------------------------------------------------  IN: 495.7 mL / OUT: 1205 mL / NET: -709.3 mL        PHYSICAL EXAM:    GEN: Awake, comfortable. NAD.   HEENT: NCAT, PERRL, EOMI. Mucosa moist.   NECK: Supple, no JVD.   RESP: CTA b/l  CV: tachycardic RR, normal s1/s2. No m/r/g.  ABD: Soft, NTND. BS+  EXT: Warm. No edema, clubbing, or cyanosis.   NEURO: AAOx3. No focal deficits.    MEDICATIONS  (STANDING):  chlorhexidine 2% Cloths 1 Application(s) Topical <User Schedule>  colchicine 0.6 milliGRAM(s) Oral every 12 hours  dextrose 40% Gel 15 Gram(s) Oral once  dextrose 5%. 1000 milliLiter(s) (50 mL/Hr) IV Continuous <Continuous>  dextrose 5%. 1000 milliLiter(s) (100 mL/Hr) IV Continuous <Continuous>  dextrose 50% Injectable 25 Gram(s) IV Push once  dextrose 50% Injectable 12.5 Gram(s) IV Push once  dextrose 50% Injectable 25 Gram(s) IV Push once  enoxaparin Injectable 40 milliGRAM(s) SubCutaneous every 12 hours  glucagon  Injectable 1 milliGRAM(s) IntraMuscular once  influenza   Vaccine 0.5 milliLiter(s) IntraMuscular once  iron sucrose IVPB 300 milliGRAM(s) IV Intermittent every 24 hours  milrinone Infusion 0.125 MICROgram(s)/kG/Min (3.7 mL/Hr) IV Continuous <Continuous>  pantoprazole    Tablet 40 milliGRAM(s) Oral before breakfast  spironolactone 25 milliGRAM(s) Oral every 24 hours  valsartan 20 milliGRAM(s) Oral every 12 hours    MEDICATIONS  (PRN):  acetaminophen     Tablet .. 650 milliGRAM(s) Oral every 6 hours PRN Mild Pain (1 - 3)  aluminum hydroxide/magnesium hydroxide/simethicone Suspension 30 milliLiter(s) Oral every 4 hours PRN Dyspepsia      Allergies    No Known Allergies    Intolerances        LABS:                        11.5   10.14 )-----------( 312      ( 20 Jan 2022 05:01 )             35.9     01-20    132<L>  |  93<L>  |  13  ----------------------------<  101<H>  3.7   |  26  |  0.73    Ca    9.2      20 Jan 2022 05:01  Phos  3.7     01-20  Mg     2.5     01-20    TPro  7.0  /  Alb  3.7  /  TBili  0.4  /  DBili  x   /  AST  31  /  ALT  75<H>  /  AlkPhos  162<H>  01-20    PTT - ( 18 Jan 2022 16:23 )  PTT:29.3 sec        ECHO:  < from: TTE Echo Complete w/o Contrast w/ Doppler (01.14.22 @ 11:35) >  ---------------------------------------------------------------------------  -----  CONCLUSIONS:     1. Patient was tachycardic during the study.   2. No significant valvular disease.   3. The right ventricle is normal in size. Right ventricular systolic   function is probably normal.   4. Left ventricular systolic function is severely reduced with a   calculated ejection fraction of 15% with regional wall motion   abnormalities. Relatively preserved basal wall motion. Remaining segments   are severely hypokinetic. Consider stress induced cardiomyopathy.   5. Trivial pericardial effusion.    ---------------------------------------------------------------------------  -----    < end of copied text >  < from: Cardiac Catheterization (01.14.22 @ 16:58) >  Conclusions:         - RHCon 1 mcg of Nipride     - RA 17     - RV 49/16     - PA 45/33/38     - PCW 35     - PA Sat 27     - Ao - 96%         RHC on Niprode 2 mcg and IABP     - PCW 23     - PA 28/17/23     - PA Sat 33     < end of copied text >  < from: Cardiac Catheterization (01.14.22 @ 16:58) >  General Diagnostic Impressions     There is no angiographic evidence for coronary artery disease.      < end of copied text >

## 2022-01-20 NOTE — PROGRESS NOTE ADULT - ASSESSMENT
36F w/hx of anemia transferred from [facility] on 1/13/22 with myopericarditis complicated by cardiogenic shock.

## 2022-01-21 LAB
ALBUMIN SERPL ELPH-MCNC: 3.2 G/DL — LOW (ref 3.3–5)
ALBUMIN SERPL ELPH-MCNC: 3.8 G/DL — SIGNIFICANT CHANGE UP (ref 3.3–5)
ALP SERPL-CCNC: 148 U/L — HIGH (ref 40–120)
ALP SERPL-CCNC: 150 U/L — HIGH (ref 40–120)
ALT FLD-CCNC: 68 U/L — HIGH (ref 10–45)
ALT FLD-CCNC: 76 U/L — HIGH (ref 10–45)
ANION GAP SERPL CALC-SCNC: 10 MMOL/L — SIGNIFICANT CHANGE UP (ref 5–17)
ANION GAP SERPL CALC-SCNC: 14 MMOL/L — SIGNIFICANT CHANGE UP (ref 5–17)
AST SERPL-CCNC: 38 U/L — SIGNIFICANT CHANGE UP (ref 10–40)
AST SERPL-CCNC: 40 U/L — SIGNIFICANT CHANGE UP (ref 10–40)
BASOPHILS # BLD AUTO: 0.08 K/UL — SIGNIFICANT CHANGE UP (ref 0–0.2)
BASOPHILS NFR BLD AUTO: 0.8 % — SIGNIFICANT CHANGE UP (ref 0–2)
BILIRUB SERPL-MCNC: 0.4 MG/DL — SIGNIFICANT CHANGE UP (ref 0.2–1.2)
BILIRUB SERPL-MCNC: 0.4 MG/DL — SIGNIFICANT CHANGE UP (ref 0.2–1.2)
BUN SERPL-MCNC: 13 MG/DL — SIGNIFICANT CHANGE UP (ref 7–23)
BUN SERPL-MCNC: 15 MG/DL — SIGNIFICANT CHANGE UP (ref 7–23)
CALCIUM SERPL-MCNC: 8.9 MG/DL — SIGNIFICANT CHANGE UP (ref 8.4–10.5)
CALCIUM SERPL-MCNC: 9.1 MG/DL — SIGNIFICANT CHANGE UP (ref 8.4–10.5)
CHLORIDE SERPL-SCNC: 100 MMOL/L — SIGNIFICANT CHANGE UP (ref 96–108)
CHLORIDE SERPL-SCNC: 97 MMOL/L — SIGNIFICANT CHANGE UP (ref 96–108)
CO2 SERPL-SCNC: 20 MMOL/L — LOW (ref 22–31)
CO2 SERPL-SCNC: 23 MMOL/L — SIGNIFICANT CHANGE UP (ref 22–31)
CREAT SERPL-MCNC: 0.59 MG/DL — SIGNIFICANT CHANGE UP (ref 0.5–1.3)
CREAT SERPL-MCNC: 0.68 MG/DL — SIGNIFICANT CHANGE UP (ref 0.5–1.3)
EOSINOPHIL # BLD AUTO: 0.35 K/UL — SIGNIFICANT CHANGE UP (ref 0–0.5)
EOSINOPHIL NFR BLD AUTO: 3.6 % — SIGNIFICANT CHANGE UP (ref 0–6)
GLUCOSE SERPL-MCNC: 102 MG/DL — HIGH (ref 70–99)
GLUCOSE SERPL-MCNC: 99 MG/DL — SIGNIFICANT CHANGE UP (ref 70–99)
HCT VFR BLD CALC: 36 % — SIGNIFICANT CHANGE UP (ref 34.5–45)
HGB BLD-MCNC: 11.4 G/DL — LOW (ref 11.5–15.5)
IMM GRANULOCYTES NFR BLD AUTO: 0.8 % — SIGNIFICANT CHANGE UP (ref 0–1.5)
LACTATE SERPL-SCNC: 1.4 MMOL/L — SIGNIFICANT CHANGE UP (ref 0.5–2)
LACTATE SERPL-SCNC: 1.5 MMOL/L — SIGNIFICANT CHANGE UP (ref 0.5–2)
LYMPHOCYTES # BLD AUTO: 1.48 K/UL — SIGNIFICANT CHANGE UP (ref 1–3.3)
LYMPHOCYTES # BLD AUTO: 15.4 % — SIGNIFICANT CHANGE UP (ref 13–44)
MAGNESIUM SERPL-MCNC: 2.3 MG/DL — SIGNIFICANT CHANGE UP (ref 1.6–2.6)
MCHC RBC-ENTMCNC: 29 PG — SIGNIFICANT CHANGE UP (ref 27–34)
MCHC RBC-ENTMCNC: 31.7 GM/DL — LOW (ref 32–36)
MCV RBC AUTO: 91.6 FL — SIGNIFICANT CHANGE UP (ref 80–100)
MONOCYTES # BLD AUTO: 0.61 K/UL — SIGNIFICANT CHANGE UP (ref 0–0.9)
MONOCYTES NFR BLD AUTO: 6.3 % — SIGNIFICANT CHANGE UP (ref 2–14)
NEUTROPHILS # BLD AUTO: 7.01 K/UL — SIGNIFICANT CHANGE UP (ref 1.8–7.4)
NEUTROPHILS NFR BLD AUTO: 73.1 % — SIGNIFICANT CHANGE UP (ref 43–77)
NRBC # BLD: 0 /100 WBCS — SIGNIFICANT CHANGE UP (ref 0–0)
PHOSPHATE SERPL-MCNC: 3.3 MG/DL — SIGNIFICANT CHANGE UP (ref 2.5–4.5)
PLATELET # BLD AUTO: 313 K/UL — SIGNIFICANT CHANGE UP (ref 150–400)
POTASSIUM SERPL-MCNC: 4.4 MMOL/L — SIGNIFICANT CHANGE UP (ref 3.5–5.3)
POTASSIUM SERPL-MCNC: 4.5 MMOL/L — SIGNIFICANT CHANGE UP (ref 3.5–5.3)
POTASSIUM SERPL-SCNC: 4.4 MMOL/L — SIGNIFICANT CHANGE UP (ref 3.5–5.3)
POTASSIUM SERPL-SCNC: 4.5 MMOL/L — SIGNIFICANT CHANGE UP (ref 3.5–5.3)
PROT SERPL-MCNC: 6.8 G/DL — SIGNIFICANT CHANGE UP (ref 6–8.3)
PROT SERPL-MCNC: 7 G/DL — SIGNIFICANT CHANGE UP (ref 6–8.3)
RBC # BLD: 3.93 M/UL — SIGNIFICANT CHANGE UP (ref 3.8–5.2)
RBC # FLD: 16.8 % — HIGH (ref 10.3–14.5)
SODIUM SERPL-SCNC: 130 MMOL/L — LOW (ref 135–145)
SODIUM SERPL-SCNC: 134 MMOL/L — LOW (ref 135–145)
WBC # BLD: 9.61 K/UL — SIGNIFICANT CHANGE UP (ref 3.8–10.5)
WBC # FLD AUTO: 9.61 K/UL — SIGNIFICANT CHANGE UP (ref 3.8–10.5)

## 2022-01-21 PROCEDURE — 99233 SBSQ HOSP IP/OBS HIGH 50: CPT

## 2022-01-21 PROCEDURE — 71045 X-RAY EXAM CHEST 1 VIEW: CPT | Mod: 26

## 2022-01-21 PROCEDURE — 99291 CRITICAL CARE FIRST HOUR: CPT

## 2022-01-21 RX ADMIN — Medication 0.6 MILLIGRAM(S): at 22:37

## 2022-01-21 RX ADMIN — ENOXAPARIN SODIUM 40 MILLIGRAM(S): 100 INJECTION SUBCUTANEOUS at 19:28

## 2022-01-21 RX ADMIN — CHLORHEXIDINE GLUCONATE 1 APPLICATION(S): 213 SOLUTION TOPICAL at 07:03

## 2022-01-21 RX ADMIN — VALSARTAN 20 MILLIGRAM(S): 80 TABLET ORAL at 22:36

## 2022-01-21 RX ADMIN — VALSARTAN 20 MILLIGRAM(S): 80 TABLET ORAL at 11:01

## 2022-01-21 RX ADMIN — SPIRONOLACTONE 25 MILLIGRAM(S): 25 TABLET, FILM COATED ORAL at 07:04

## 2022-01-21 RX ADMIN — Medication 0.6 MILLIGRAM(S): at 10:04

## 2022-01-21 RX ADMIN — PANTOPRAZOLE SODIUM 40 MILLIGRAM(S): 20 TABLET, DELAYED RELEASE ORAL at 07:03

## 2022-01-21 RX ADMIN — ENOXAPARIN SODIUM 40 MILLIGRAM(S): 100 INJECTION SUBCUTANEOUS at 07:04

## 2022-01-21 RX ADMIN — IRON SUCROSE 176.67 MILLIGRAM(S): 20 INJECTION, SOLUTION INTRAVENOUS at 11:53

## 2022-01-21 NOTE — PHYSICAL THERAPY INITIAL EVALUATION ADULT - DIAGNOSIS, PT EVAL
5A: Primary prevention/risk reduction for loss of balance and falling 6D: impaired aerobic capacity/endurance associated with cardiovascular pump dysfunction or failure

## 2022-01-21 NOTE — PROGRESS NOTE ADULT - SUBJECTIVE AND OBJECTIVE BOX
Subjective:  Continues to feel better every day  Laying flat without dyspnea  Ambulated the room yesterday without symptoms other than lightheadedness     Medications:  acetaminophen     Tablet .. 650 milliGRAM(s) Oral every 6 hours PRN  aluminum hydroxide/magnesium hydroxide/simethicone Suspension 30 milliLiter(s) Oral every 4 hours PRN  chlorhexidine 2% Cloths 1 Application(s) Topical <User Schedule>  colchicine 0.6 milliGRAM(s) Oral every 12 hours  dextrose 40% Gel 15 Gram(s) Oral once  dextrose 5%. 1000 milliLiter(s) IV Continuous <Continuous>  dextrose 5%. 1000 milliLiter(s) IV Continuous <Continuous>  dextrose 50% Injectable 25 Gram(s) IV Push once  dextrose 50% Injectable 12.5 Gram(s) IV Push once  dextrose 50% Injectable 25 Gram(s) IV Push once  enoxaparin Injectable 40 milliGRAM(s) SubCutaneous every 12 hours  glucagon  Injectable 1 milliGRAM(s) IntraMuscular once  influenza   Vaccine 0.5 milliLiter(s) IntraMuscular once  iron sucrose IVPB 300 milliGRAM(s) IV Intermittent every 24 hours  pantoprazole    Tablet 40 milliGRAM(s) Oral before breakfast  spironolactone 25 milliGRAM(s) Oral every 24 hours  valsartan 20 milliGRAM(s) Oral every 12 hours    Vitals:  T(C): 36.7 (01-21-22 @ 10:00), Max: 36.9 (01-20-22 @ 22:11)  HR: 114 (01-21-22 @ 12:00) (99 - 129)  BP: 87/57 (01-21-22 @ 12:00) (83/60 - 105/69)  BP(mean): 67 (01-21-22 @ 12:00) (63 - 79)  RR: 20 (01-21-22 @ 12:00) (15 - 40)  SpO2: 95% (01-21-22 @ 12:00) (92% - 97%)    Daily     Daily         I&O's Summary    20 Jan 2022 07:01  -  21 Jan 2022 07:00  --------------------------------------------------------  IN: 2458.6 mL / OUT: 3445 mL / NET: -986.4 mL    21 Jan 2022 07:01  -  21 Jan 2022 12:20  --------------------------------------------------------  IN: 11.1 mL / OUT: 670 mL / NET: -658.9 mL        Physical Exam:  Appearance: No Acute Distress  HEENT: JVP non elevated  Cardiovascular: tachycardic, no S3, no murmurs   Respiratory: Clear to auscultation bilaterally  Gastrointestinal: Soft, Non-tender, non-distended	  Skin: no skin lesions  Neurologic: Non-focal  Extremities: No LE edema, warm and well perfused  Psychiatry: A & O x 3, Mood & affect appropriate      Labs:                        11.4   9.61  )-----------( 313      ( 21 Jan 2022 05:20 )             36.0     01-21    130<L>  |  97  |  15  ----------------------------<  102<H>  4.4   |  23  |  0.68    Ca    9.1      21 Jan 2022 05:20  Phos  3.3     01-21  Mg     2.3     01-21    TPro  6.8  /  Alb  3.2<L>  /  TBili  0.4  /  DBili  x   /  AST  40  /  ALT  76<H>  /  AlkPhos  148<H>  01-21                  Lactate, Blood: 1.5 mmol/L (01-21 @ 05:20)  Lactate, Blood: 0.8 mmol/L (01-20 @ 05:01)  Lactate, Blood: 1.6 mmol/L (01-19 @ 16:19)  Lactate, Blood: 1.1 mmol/L (01-19 @ 05:57)  Lactate, Blood: 2.4 mmol/L (01-18 @ 21:16)  Lactate, Blood: 1.9 mmol/L (01-18 @ 13:43)

## 2022-01-21 NOTE — PROGRESS NOTE ADULT - ASSESSMENT
37 YO F with a history of morbid obesity and chronic anemia from menorrhagia who presented to [facility] with days-weeks of worsening chest pain and heart failure symptoms and found to have elevated troponin levels with diffuse nonspecific ST elevations associated with severe LV dysfunction. She had signs of end organ perfusion (lactate 2.4, transaminitis) and underwent R/LHC 1/14 which revealed severely elevated filing pressures and low cardiac output prompting placement of IABP. Her hemodynamics improved and IABP was successfully removed on 1/18. She is tolerating progressive weaning of inotropes.    Overall, presentation is consistent with cardiogenic shock in setting of likely myocarditis (possibly COVID-19 related).    She remains with sinus tachycardia and borderline blood pressure which is ominous but hemodynamics have been favorable and she has reassuringly been asymptomatic. She remains with severe LV dysfunction which will hopefully improve with time given acuity of myocarditis. Will discontinue inotropes and uptitrate GDMT as tolerates.     HEMODYNAMICS  1/18 (IABP 1:1, nipride 1.5): RA 9, PA 40/20 (27), PCWP 20, PA sat 68% with [place] CO/CI 7.6/3.7. BP 89/60 with IABP on standby and on nipride.   1/19 (milrinone 0.25, nipride 1.5): RA 8, PA 30/12, PCWP 11, PA sat 71% with [place] CO/CI 7.4/3.6  1/20 (milrinone 0.25): [place] CO/CI 7.3/3.5    REVIEW OF STUDIES  TTE 1/14: LV 4.3 cm, LVEF 15% with relatively preserved basal motion, LVOT VTI 11 cm, grossly normal RV function, no significant valvular abnormalities:     TTE 1/19: LV 4.3 cm, LVEF visually 20-25% with global hypokinesis worse in the apex, LVOT VTI 13 cm, normal RV size/function    COVID PCR: negative. COVID spike and nucleocapsid Ab positive.    PLAN  # Acute systolic heart failure with cardiogenic shock  - Discontinue milrinone  - GDMT: continue valsartan 20 mg BID with plans to eventually switch to Entresto (BP precludes this at this time). continue spironolactone 25 mg daily. tentatively plan to start metoprolol succinate 12.5 mg daily tomorrow when off milrinone  - Diuretics: continue off diuretics  - High risk HF features remain present but will continue to medically manage in hopes of recovery at this time pending any changes to clinical status   - Will repeat TTE on Monday    # Myopericarditis  - Reasonable to continue colchicine  - Cardiac MRI pending     # Hyponatremia  - initially hypervolemic hyponatremia which improved with diuretics and now stable in 130-132 range     # Anemia  - iron deficiency anemia likely from menorrhagia   - administer IV sucrose 200 mg x 5 days which has improved hemoglobin     # Transaminitis  - improving and now nearlt resolved    # Hypoxia  - CTA negative for PE, she is no longer requiring supplemental oxygen

## 2022-01-21 NOTE — PROGRESS NOTE ADULT - SUBJECTIVE AND OBJECTIVE BOX
OVERNIGHT EVENTS: NAEO    SUBJECTIVE / INTERVAL HPI: Patient seen and examined at bedside. On room air, up and walking around. Reports she feels much better. Did not work with PT yesterday however was OOBTC and felt fine, reports mildly lightheaded so returned to bed. Reports persistent continued dry cough however much improved from before. Good PO intake. LBM this morning. Slept on RA all night, denies orthopnea or PND. Patient denying chest pain, SOB, palpitations. Patient denies fever, chills, HA, Dizziness, N/V, abdominal pain, diarrhea, constipation, hematochezia/melena, dysuria, hematuria, new onset weakness/numbness, LE pain and/or swelling.  Remaining ROS negative       PHYSICAL EXAM:    General: seen out of bed and walking around room, NAD, appears well and comfortable   HEENT: NC/AT; PERRL, anicteric sclera; MMM  Neck: supple, no JVD.  Cardiovascular: +S1/S2, tachycardic rate, regular rhythm, no murmurs   Respiratory: CTA B/L; no W/R/R. satting well on room air. no increased WOB.   Gastrointestinal: soft, NT/ND; +BSx4  Extremities: WWP; no edema, clubbing or cyanosis.   Vascular: 2+ radial, DP pulses B/L  Neurological: AAOx3; no focal deficits  Dermatologic: no appreciable wounds or damage to the skin    VITAL SIGNS:  Vital Signs Last 24 Hrs  T(C): 36.6 (21 Jan 2022 05:05), Max: 36.9 (20 Jan 2022 22:11)  T(F): 97.9 (21 Jan 2022 05:05), Max: 98.5 (20 Jan 2022 22:11)  HR: 121 (21 Jan 2022 08:00) (99 - 129)  BP: 83/60 (21 Jan 2022 06:00) (83/60 - 105/69)  BP(mean): 67 (21 Jan 2022 06:00) (63 - 79)  RR: 30 (21 Jan 2022 08:00) (15 - 40)  SpO2: 94% (21 Jan 2022 08:00) (92% - 97%)      MEDICATIONS:  MEDICATIONS  (STANDING):  chlorhexidine 2% Cloths 1 Application(s) Topical <User Schedule>  colchicine 0.6 milliGRAM(s) Oral every 12 hours  dextrose 40% Gel 15 Gram(s) Oral once  dextrose 5%. 1000 milliLiter(s) (50 mL/Hr) IV Continuous <Continuous>  dextrose 5%. 1000 milliLiter(s) (100 mL/Hr) IV Continuous <Continuous>  dextrose 50% Injectable 25 Gram(s) IV Push once  dextrose 50% Injectable 12.5 Gram(s) IV Push once  dextrose 50% Injectable 25 Gram(s) IV Push once  enoxaparin Injectable 40 milliGRAM(s) SubCutaneous every 12 hours  glucagon  Injectable 1 milliGRAM(s) IntraMuscular once  influenza   Vaccine 0.5 milliLiter(s) IntraMuscular once  iron sucrose IVPB 300 milliGRAM(s) IV Intermittent every 24 hours  milrinone Infusion 0.125 MICROgram(s)/kG/Min (3.7 mL/Hr) IV Continuous <Continuous>  pantoprazole    Tablet 40 milliGRAM(s) Oral before breakfast  spironolactone 25 milliGRAM(s) Oral every 24 hours  valsartan 20 milliGRAM(s) Oral every 12 hours    MEDICATIONS  (PRN):  acetaminophen     Tablet .. 650 milliGRAM(s) Oral every 6 hours PRN Mild Pain (1 - 3)  aluminum hydroxide/magnesium hydroxide/simethicone Suspension 30 milliLiter(s) Oral every 4 hours PRN Dyspepsia      ALLERGIES:  Allergies    No Known Allergies    Intolerances        LABS:                        11.4   9.61  )-----------( 313      ( 21 Jan 2022 05:20 )             36.0     01-21    130<L>  |  97  |  15  ----------------------------<  102<H>  4.4   |  23  |  0.68    Ca    9.1      21 Jan 2022 05:20  Phos  3.3     01-21  Mg     2.3     01-21    TPro  6.8  /  Alb  3.2<L>  /  TBili  0.4  /  DBili  x   /  AST  40  /  ALT  76<H>  /  AlkPhos  148<H>  01-21        CAPILLARY BLOOD GLUCOSE          RADIOLOGY & ADDITIONAL TESTS: Reviewed. OVERNIGHT EVENTS: NAEO    SUBJECTIVE / INTERVAL HPI: Patient seen and examined at bedside. On room air, up and walking around. Reports she feels much better. Did not work with PT yesterday however was OOBTC and felt fine, reports mildly lightheaded so returned to bed. Reports persistent continued dry cough however much improved from before. Good PO intake. LBM this morning. Slept on RA all night, denies orthopnea or PND. Patient denying chest pain, SOB, palpitations. Patient denies fever, chills, HA, Dizziness, N/V, abdominal pain, diarrhea, constipation, hematochezia/melena, dysuria, hematuria, new onset weakness/numbness, LE pain and/or swelling.  Remaining ROS negative   [place] d/colin today. OOBTC and PT eval.    PHYSICAL EXAM:    General: seen out of bed and walking around room, NAD, appears well and comfortable   HEENT: NC/AT; PERRL, anicteric sclera; MMM  Neck: supple, no JVD.  Cardiovascular: +S1/S2, tachycardic rate, regular rhythm, no murmurs   Respiratory: CTA B/L; no W/R/R. satting well on room air. no increased WOB.   Gastrointestinal: soft, NT/ND; +BSx4  Extremities: WWP; no edema, clubbing or cyanosis.   Vascular: 2+ radial, DP pulses B/L  Neurological: AAOx3; no focal deficits  Dermatologic: no appreciable wounds or damage to the skin    VITAL SIGNS:  Vital Signs Last 24 Hrs  T(C): 36.6 (21 Jan 2022 05:05), Max: 36.9 (20 Jan 2022 22:11)  T(F): 97.9 (21 Jan 2022 05:05), Max: 98.5 (20 Jan 2022 22:11)  HR: 121 (21 Jan 2022 08:00) (99 - 129)  BP: 83/60 (21 Jan 2022 06:00) (83/60 - 105/69)  BP(mean): 67 (21 Jan 2022 06:00) (63 - 79)  RR: 30 (21 Jan 2022 08:00) (15 - 40)  SpO2: 94% (21 Jan 2022 08:00) (92% - 97%)      MEDICATIONS:  MEDICATIONS  (STANDING):  chlorhexidine 2% Cloths 1 Application(s) Topical <User Schedule>  colchicine 0.6 milliGRAM(s) Oral every 12 hours  dextrose 40% Gel 15 Gram(s) Oral once  dextrose 5%. 1000 milliLiter(s) (50 mL/Hr) IV Continuous <Continuous>  dextrose 5%. 1000 milliLiter(s) (100 mL/Hr) IV Continuous <Continuous>  dextrose 50% Injectable 25 Gram(s) IV Push once  dextrose 50% Injectable 12.5 Gram(s) IV Push once  dextrose 50% Injectable 25 Gram(s) IV Push once  enoxaparin Injectable 40 milliGRAM(s) SubCutaneous every 12 hours  glucagon  Injectable 1 milliGRAM(s) IntraMuscular once  influenza   Vaccine 0.5 milliLiter(s) IntraMuscular once  iron sucrose IVPB 300 milliGRAM(s) IV Intermittent every 24 hours  milrinone Infusion 0.125 MICROgram(s)/kG/Min (3.7 mL/Hr) IV Continuous <Continuous>  pantoprazole    Tablet 40 milliGRAM(s) Oral before breakfast  spironolactone 25 milliGRAM(s) Oral every 24 hours  valsartan 20 milliGRAM(s) Oral every 12 hours    MEDICATIONS  (PRN):  acetaminophen     Tablet .. 650 milliGRAM(s) Oral every 6 hours PRN Mild Pain (1 - 3)  aluminum hydroxide/magnesium hydroxide/simethicone Suspension 30 milliLiter(s) Oral every 4 hours PRN Dyspepsia      ALLERGIES:  Allergies    No Known Allergies    Intolerances        LABS:                        11.4   9.61  )-----------( 313      ( 21 Jan 2022 05:20 )             36.0     01-21    130<L>  |  97  |  15  ----------------------------<  102<H>  4.4   |  23  |  0.68    Ca    9.1      21 Jan 2022 05:20  Phos  3.3     01-21  Mg     2.3     01-21    TPro  6.8  /  Alb  3.2<L>  /  TBili  0.4  /  DBili  x   /  AST  40  /  ALT  76<H>  /  AlkPhos  148<H>  01-21        CAPILLARY BLOOD GLUCOSE          RADIOLOGY & ADDITIONAL TESTS: Reviewed.

## 2022-01-21 NOTE — PROGRESS NOTE ADULT - REASON FOR ADMISSION
cardiogenic shock

## 2022-01-21 NOTE — PROGRESS NOTE ADULT - ASSESSMENT
Ms Harrell is a 37 yo F w/ no PMHx admitted to [place] for perimyocarditis and then transferred to [facility] for further work up and management of cardiogenic shock.    Neuro  AAOx3, No active issues    Cardiac  #Acute HFrEF 2/2 perimyocarditis  Lactate now cleared, LFTs downtrending, Cr wnl.   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 balloon pump placed 1/14; removed 1/18 and transitioned to milrinone as cardiac output improved   -HF following , recs appreciated   -d/c milrinone today and monitor labs/hemodynamics off of it   -holding diuresis as patient euvolemic   -c/w spironolactone 25mg daily    -c/w valsartan 20mg BID for afterload reduction ; if BPs increase can transition to entresto    -holding beta blocker for now given hypotension with reflex tachycardia; consider adding toprol tomorrow if pressures better  -strict I/Os  -f/u cardiac MRI  -repeat TTE 1/19 showing severely reduced left ventricular systolic function with EF 15-20% and akinetic apical septal segment, apical lateral segment, and apex. 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 as covid abs positive for recent infection   -c/w colchicine 0.6mg BID  -f/u cardiac MRI    -c/w HF management as above     Pulm  -patient satting well on RA without increased WOB  -c/w incentive spirometry for atelectasis   -CTA negative for PE    Renal  #Hyponatremia  Patient persistently hyponatremic since admission   -patient asymptomatic  -can no longer attribute to hypervolemia as patient as been diuresing well since admission; likely added component of poor PO intake   -Continue to monitor BMP     GI  #Transaminitis   Patient with ALP/ALT remaining elevated; likely 2/2 congestive hepatopathy versus ischemia i/s/o cardiogenic shock  -abd US with 2 hypoechoic liver lesions c/w hemangiomas; f/u outpatient    -continue to monitor  -hepatitis panel negative    Heme-Onc  -no active issues      Endocrine  No active issues    ID  No active issues    Other  F: none  E: replete K<4, Mg<2  N: DASH/TLC diet with ensure     VTE Prophylaxis: lovenox   GI: Pantoprazole 40mg PO daily  C: Full Code  D: CCU    lines: right radial A line

## 2022-01-21 NOTE — PHYSICAL THERAPY INITIAL EVALUATION ADULT - ADDITIONAL COMMENTS
Patient lives with father, mother and 3+10 y.o children on 2nd floor of a walk-up apartment. Does not use any Assistive Devices at baseline. Denies recent Hx of falls.

## 2022-01-21 NOTE — PHYSICAL THERAPY INITIAL EVALUATION ADULT - GENERAL OBSERVATIONS, REHAB EVAL
Patient received semi-guardado in bed  in NAD on RA, +SCDs, +PIV, +ICU Telemetry, +A-line. Cleared by TED Bright. Agreeable to PT.

## 2022-01-21 NOTE — PHYSICAL THERAPY INITIAL EVALUATION ADULT - PERTINENT HX OF CURRENT PROBLEM, REHAB EVAL
37 yo F w/ no PMHx admitted to [facility] for perimyocarditis and then transferred to [facility] for further work up. Found to be in cardiogenic shock 2/2 perimyocarditis of unknown etiology.

## 2022-01-21 NOTE — PROGRESS NOTE ADULT - ATTENDING COMMENTS
36F, obese, no significant PMH w/ recent [facility] visit ~2weeks for PNA, tested positive for Covid, sent home on oral abx. 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 Weaned off Nipride on 1/18. She is currently on Milrinone 0.125mcg, Valsartan 20 BID and Spironolactone 25mg daily; AM Hemodynamics CO/CI(on Milrinone and Valsartan) 6.8/3.3 and SVR 700s. PA Catheter dc'd; Plan to d/c Milrinone, c/w Valsartan BID. Consider low dose BB in the next 24-48hrs; Obtain cMRI; Advanced HF following, appreciate recs  -OOB to chair / PT / incentive spirometry  -DASH diet  -DVT PPx  -Dispo: CCU  -Full Code    Sebastián Humphries MD  CCU Attending 36F, obese, no significant PMH w/ recent [place] [facility] visit ~2weeks for PNA, tested positive for Covid, sent home on oral abx. Subsequently presents back to [place] [facility] on 1/9 w/ nausea, abdominal discomfort, SOB -> found to have acute systolic CHF 2/2 Myocarditis c/b cardiogenic shock, tx'd to [facility] CCU for further mgmt    -sCHF - acute systolic CHF 2/2 Myocarditis(recent Covid infection 2wks ago, c/b cardiogenic shock s/p L/RHC - no obstructive CAD; RHC w/ severely elevated R/L filling pressures and low cardiac indices c/w cardiogenic shock - s/p IABP placement on 1/14 - 1/18. Weaned off Nipride on 1/18. She is currently on Milrinone 0.125mcg, Valsartan 20 BID and Spironolactone 25mg daily; Remains warm on exam, Lactate WNL, w/ good end-organ function; Plan to d/c Milrinone, c/w Valsartan BID. Consider low dose BB in the next 24-48hrs; Obtain cMRI; Advanced HF following, appreciate recs  -OOB to chair / PT / incentive spirometry  -DASH diet  -DVT PPx  -Dispo: CCU  -Full Code    Sebastián Humphries MD  CCU Attending

## 2022-01-22 DIAGNOSIS — E87.1 HYPO-OSMOLALITY AND HYPONATREMIA: ICD-10-CM

## 2022-01-22 DIAGNOSIS — R00.0 TACHYCARDIA, UNSPECIFIED: ICD-10-CM

## 2022-01-22 DIAGNOSIS — I51.4 MYOCARDITIS, UNSPECIFIED: ICD-10-CM

## 2022-01-22 LAB
ALBUMIN SERPL ELPH-MCNC: 3.5 G/DL — SIGNIFICANT CHANGE UP (ref 3.3–5)
ALP SERPL-CCNC: 134 U/L — HIGH (ref 40–120)
ALT FLD-CCNC: 60 U/L — HIGH (ref 10–45)
ANION GAP SERPL CALC-SCNC: 12 MMOL/L — SIGNIFICANT CHANGE UP (ref 5–17)
AST SERPL-CCNC: 27 U/L — SIGNIFICANT CHANGE UP (ref 10–40)
BASOPHILS # BLD AUTO: 0.08 K/UL — SIGNIFICANT CHANGE UP (ref 0–0.2)
BASOPHILS NFR BLD AUTO: 0.9 % — SIGNIFICANT CHANGE UP (ref 0–2)
BILIRUB SERPL-MCNC: 0.6 MG/DL — SIGNIFICANT CHANGE UP (ref 0.2–1.2)
BLD GP AB SCN SERPL QL: NEGATIVE — SIGNIFICANT CHANGE UP
BUN SERPL-MCNC: 11 MG/DL — SIGNIFICANT CHANGE UP (ref 7–23)
CALCIUM SERPL-MCNC: 9.1 MG/DL — SIGNIFICANT CHANGE UP (ref 8.4–10.5)
CHLORIDE SERPL-SCNC: 101 MMOL/L — SIGNIFICANT CHANGE UP (ref 96–108)
CO2 SERPL-SCNC: 21 MMOL/L — LOW (ref 22–31)
CREAT SERPL-MCNC: 0.7 MG/DL — SIGNIFICANT CHANGE UP (ref 0.5–1.3)
CV B1 AB TITR FLD: NEGATIVE — SIGNIFICANT CHANGE UP
CV B2 AB TITR FLD: NEGATIVE — SIGNIFICANT CHANGE UP
CV B3 AB TITR FLD: NEGATIVE — SIGNIFICANT CHANGE UP
CV B4 AB TITR FLD: NEGATIVE — SIGNIFICANT CHANGE UP
CV B5 AB TITR FLD: NEGATIVE — SIGNIFICANT CHANGE UP
CV B6 AB TITR FLD: NEGATIVE — SIGNIFICANT CHANGE UP
EOSINOPHIL # BLD AUTO: 0.2 K/UL — SIGNIFICANT CHANGE UP (ref 0–0.5)
EOSINOPHIL NFR BLD AUTO: 2.3 % — SIGNIFICANT CHANGE UP (ref 0–6)
GLUCOSE BLDC GLUCOMTR-MCNC: 103 MG/DL — HIGH (ref 70–99)
GLUCOSE SERPL-MCNC: 93 MG/DL — SIGNIFICANT CHANGE UP (ref 70–99)
HCT VFR BLD CALC: 34.8 % — SIGNIFICANT CHANGE UP (ref 34.5–45)
HGB BLD-MCNC: 10.5 G/DL — LOW (ref 11.5–15.5)
IMM GRANULOCYTES NFR BLD AUTO: 0.7 % — SIGNIFICANT CHANGE UP (ref 0–1.5)
LACTATE SERPL-SCNC: 1.8 MMOL/L — SIGNIFICANT CHANGE UP (ref 0.5–2)
LYMPHOCYTES # BLD AUTO: 1.58 K/UL — SIGNIFICANT CHANGE UP (ref 1–3.3)
LYMPHOCYTES # BLD AUTO: 18.5 % — SIGNIFICANT CHANGE UP (ref 13–44)
MAGNESIUM SERPL-MCNC: 2.6 MG/DL — SIGNIFICANT CHANGE UP (ref 1.6–2.6)
MCHC RBC-ENTMCNC: 28.2 PG — SIGNIFICANT CHANGE UP (ref 27–34)
MCHC RBC-ENTMCNC: 30.2 GM/DL — LOW (ref 32–36)
MCV RBC AUTO: 93.3 FL — SIGNIFICANT CHANGE UP (ref 80–100)
MONOCYTES # BLD AUTO: 0.67 K/UL — SIGNIFICANT CHANGE UP (ref 0–0.9)
MONOCYTES NFR BLD AUTO: 7.9 % — SIGNIFICANT CHANGE UP (ref 2–14)
NEUTROPHILS # BLD AUTO: 5.94 K/UL — SIGNIFICANT CHANGE UP (ref 1.8–7.4)
NEUTROPHILS NFR BLD AUTO: 69.7 % — SIGNIFICANT CHANGE UP (ref 43–77)
NRBC # BLD: 0 /100 WBCS — SIGNIFICANT CHANGE UP (ref 0–0)
PHOSPHATE SERPL-MCNC: 3.1 MG/DL — SIGNIFICANT CHANGE UP (ref 2.5–4.5)
PLATELET # BLD AUTO: 312 K/UL — SIGNIFICANT CHANGE UP (ref 150–400)
POTASSIUM SERPL-MCNC: 4 MMOL/L — SIGNIFICANT CHANGE UP (ref 3.5–5.3)
POTASSIUM SERPL-SCNC: 4 MMOL/L — SIGNIFICANT CHANGE UP (ref 3.5–5.3)
PROT SERPL-MCNC: 6.8 G/DL — SIGNIFICANT CHANGE UP (ref 6–8.3)
RBC # BLD: 3.73 M/UL — LOW (ref 3.8–5.2)
RBC # FLD: 17.2 % — HIGH (ref 10.3–14.5)
RH IG SCN BLD-IMP: POSITIVE — SIGNIFICANT CHANGE UP
SODIUM SERPL-SCNC: 134 MMOL/L — LOW (ref 135–145)
WBC # BLD: 8.53 K/UL — SIGNIFICANT CHANGE UP (ref 3.8–10.5)
WBC # FLD AUTO: 8.53 K/UL — SIGNIFICANT CHANGE UP (ref 3.8–10.5)

## 2022-01-22 PROCEDURE — 71045 X-RAY EXAM CHEST 1 VIEW: CPT | Mod: 26

## 2022-01-22 PROCEDURE — 99291 CRITICAL CARE FIRST HOUR: CPT

## 2022-01-22 PROCEDURE — 75561 CARDIAC MRI FOR MORPH W/DYE: CPT | Mod: 26

## 2022-01-22 RX ORDER — METOPROLOL TARTRATE 50 MG
12.5 TABLET ORAL EVERY 24 HOURS
Refills: 0 | Status: DISCONTINUED | OUTPATIENT
Start: 2022-01-23 | End: 2022-01-25

## 2022-01-22 RX ORDER — SACUBITRIL AND VALSARTAN 24; 26 MG/1; MG/1
1 TABLET, FILM COATED ORAL
Qty: 60 | Refills: 0
Start: 2022-01-22 | End: 2022-02-20

## 2022-01-22 RX ORDER — SACUBITRIL AND VALSARTAN 24; 26 MG/1; MG/1
1 TABLET, FILM COATED ORAL EVERY 12 HOURS
Refills: 0 | Status: DISCONTINUED | OUTPATIENT
Start: 2022-01-22 | End: 2022-01-22

## 2022-01-22 RX ORDER — METOPROLOL TARTRATE 50 MG
12.5 TABLET ORAL EVERY 24 HOURS
Refills: 0 | Status: DISCONTINUED | OUTPATIENT
Start: 2022-01-22 | End: 2022-01-22

## 2022-01-22 RX ORDER — SACUBITRIL AND VALSARTAN 24; 26 MG/1; MG/1
1 TABLET, FILM COATED ORAL EVERY 12 HOURS
Refills: 0 | Status: DISCONTINUED | OUTPATIENT
Start: 2022-01-22 | End: 2022-01-25

## 2022-01-22 RX ORDER — SPIRONOLACTONE 25 MG/1
25 TABLET, FILM COATED ORAL EVERY 24 HOURS
Refills: 0 | Status: DISCONTINUED | OUTPATIENT
Start: 2022-01-23 | End: 2022-01-25

## 2022-01-22 RX ADMIN — Medication 0.6 MILLIGRAM(S): at 14:23

## 2022-01-22 RX ADMIN — Medication 12.5 MILLIGRAM(S): at 10:03

## 2022-01-22 RX ADMIN — CHLORHEXIDINE GLUCONATE 1 APPLICATION(S): 213 SOLUTION TOPICAL at 06:00

## 2022-01-22 RX ADMIN — Medication 650 MILLIGRAM(S): at 00:36

## 2022-01-22 RX ADMIN — SACUBITRIL AND VALSARTAN 1 TABLET(S): 24; 26 TABLET, FILM COATED ORAL at 17:53

## 2022-01-22 RX ADMIN — Medication 0.6 MILLIGRAM(S): at 22:29

## 2022-01-22 RX ADMIN — SPIRONOLACTONE 25 MILLIGRAM(S): 25 TABLET, FILM COATED ORAL at 06:00

## 2022-01-22 RX ADMIN — ENOXAPARIN SODIUM 40 MILLIGRAM(S): 100 INJECTION SUBCUTANEOUS at 17:56

## 2022-01-22 RX ADMIN — ENOXAPARIN SODIUM 40 MILLIGRAM(S): 100 INJECTION SUBCUTANEOUS at 06:00

## 2022-01-22 RX ADMIN — PANTOPRAZOLE SODIUM 40 MILLIGRAM(S): 20 TABLET, DELAYED RELEASE ORAL at 06:00

## 2022-01-22 RX ADMIN — Medication 650 MILLIGRAM(S): at 01:28

## 2022-01-22 NOTE — PROGRESS NOTE ADULT - ASSESSMENT
37 yo female with PMHx morbid obesity, chronic anemia from menorrhagia, initally admitted to [facility] 1/9/22 for acute systolic CHF likely 2/2 myocarditis c/b cardiogenic shock and transferred to [facility] CCU on 1/13/22 for further management, s/p pressor support, and slowly initiated on GDMT, stepped down to 5 [place] on 1/22 for continued medical optimization and pending cardiac MRI.

## 2022-01-22 NOTE — PROGRESS NOTE ADULT - PROBLEM SELECTOR PLAN 2
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

## 2022-01-22 NOTE — PROGRESS NOTE ADULT - PROBLEM SELECTOR PLAN 4
-initially hypervolemic hyponatremia which improved with diuretics and now stable in 130-132 range   -continue to trend -hgb 8-9s earlier in admission, improving to 10s  -iron sat 14%  -s/p venofer 300mg x3 days  -consider PO iron supplement on discharge

## 2022-01-22 NOTE — PROGRESS NOTE ADULT - PROBLEM SELECTOR PLAN 3
-hgb 8-9s earlier in admission, improving to 10s  -iron sat 14%  -s/p venofer 300mg x3 days  -consider PO iron supplement on discharge -tachycardic throughout admission, currently sinus tach 110s  -CTA chest negative for PE  -started Toprol XL 12.5mg daily 1/21

## 2022-01-22 NOTE — PROGRESS NOTE ADULT - PROBLEM SELECTOR PLAN 6
-likely 2/2 congestive hepatopathy vs ischemia i/s/o cardiogenic shock  -now downtrending  -hepatitis panel negative  -abd US with 2 hypoechoic liver lesions c/w hemangiomas; f/u outpatient    -continue to monitor    #DVT PPx- SQ lovenox    #Dispo- pending medication optimization, cardiac MRI    Case d/w Dr. Beck

## 2022-01-22 NOTE — PROGRESS NOTE ADULT - PROBLEM SELECTOR PLAN 5
-likely 2/2 congestive hepatopathy vs ischemia i/s/o cardiogenic shock  -now downtrending  -hepatitis panel negative  -abd US with 2 hypoechoic liver lesions c/w hemangiomas; f/u outpatient    -continue to monitor    #DVT PPx- SQ lovenox    #Dispo- pending medication optimization, cardiac MRI    Case d/w Dr. Beck -initially hypervolemic hyponatremia which improved with diuretics and now stable in 130-132 range   -continue to trend

## 2022-01-22 NOTE — PROGRESS NOTE ADULT - PROBLEM SELECTOR PLAN 1
-likely viral etiology (COVID infection 2 weeks prior, and + coxsackie abs)  -initially presented to [facility] with days-weeks of worsening chest pain and heart failure symptoms and found to have elevated troponin levels with diffuse nonspecific ST elevations associated with severe LV dysfunction  -s/p R/LHC 1/14 - no obstructive CAD; RHC w/ severely elevated R/L filling pressures and low cardiac indices c/w cardiogenic shock - s/p IABP placement on 1/14 - 1/18  -Weaned off Nipride on 1/18 and milrinone 1/21  -Lactate now WNL w/good end-organ function  -last TTE 1/19/22- EF 15-20%, RWMA; normal RV function, mild MR/TR   -CTA chest negative for PE  -f/u cardiac MRI  -Advanced HF following, recs appreciated   -holding diuresis as patient euvolemic   -transitioned from valsartan to Entresto 24/26mg BID on 1/22  -started Toprol XL 12.5mg QD 1/21  -c/w spironolactone 25mg daily    -Needs 1 week follow up with cardiologist for CHF readmission risk reduction -likely viral etiology (COVID infection 2 weeks prior, and + coxsackie abs)  -initially presented to [facility] with days-weeks of worsening chest pain and heart failure symptoms and found to have elevated troponin levels with diffuse nonspecific ST elevations associated with severe LV dysfunction  -s/p R/LHC 1/14 - no obstructive CAD; RHC w/ severely elevated R/L filling pressures and low cardiac indices c/w cardiogenic shock - s/p IABP placement on 1/14 - 1/18  -Weaned off Nipride on 1/18 and milrinone 1/21  -Lactate now WNL w/good end-organ function  -last TTE 1/19/22- EF 15-20%, RWMA; normal RV function, mild MR/TR   -CTA chest negative for PE  -f/u cardiac MRI  -Advanced HF following, recs appreciated   -holding diuresis as patient euvolemic   -transitioned from valsartan to Entresto 24/26mg BID on 1/22 (per pharmacy needs prior-authorization)  -started Toprol XL 12.5mg QD 1/21  -c/w spironolactone 25mg daily    -Needs 1 week follow up with cardiologist for CHF readmission risk reduction -likely viral etiology (COVID infection 2 weeks prior, and + coxsackie abs)  -initially presented to [facility] with days-weeks of worsening chest pain and heart failure symptoms and found to have elevated troponin levels with diffuse nonspecific ST elevations associated with severe LV dysfunction  -s/p R/LHC 1/14 - normal coronaries; RHC w/ severely elevated R/L filling pressures and low cardiac indices c/w cardiogenic shock - s/p IABP placement on 1/14 - 1/18  -Weaned off Nipride on 1/18 and milrinone 1/21  -Lactate now WNL w/good end-organ function  -last TTE 1/19/22- EF 15-20%, RWMA; normal RV function, mild MR/TR   -CTA chest negative for PE  -f/u cardiac MRI  -Advanced HF following, recs appreciated   -holding diuresis as patient euvolemic   -transitioned from valsartan to Entresto 24/26mg BID on 1/22 (per pharmacy needs prior-authorization)  -started Toprol XL 12.5mg QD 1/21  -c/w spironolactone 25mg daily    -Needs 1 week follow up with cardiologist for CHF readmission risk reduction

## 2022-01-22 NOTE — PROGRESS NOTE ADULT - SUBJECTIVE AND OBJECTIVE BOX
***Note in progress***    OVERNIGHT EVENTS: NAEO    SUBJECTIVE / INTERVAL HPI: Patient seen and examined at bedside.     Remaining ROS negative     PHYSICAL EXAM:  General: seen out of bed and walking around room, NAD, appears well and comfortable   HEENT: NC/AT; PERRL, anicteric sclera; MMM  Neck: supple, no JVD.  Cardiovascular: +S1/S2, tachycardic rate, regular rhythm, no murmurs   Respiratory: CTA B/L; no W/R/R. satting well on room air. no increased WOB.   Gastrointestinal: soft, NT/ND; +BSx4  Extremities: WWP; no edema, clubbing or cyanosis.   Vascular: 2+ radial, DP pulses B/L  Neurological: AAOx3; no focal deficits  Dermatologic: no appreciable wounds or damage to the skin    VITAL SIGNS:  Vital Signs Last 24 Hrs  T(C): 36.2 (22 Jan 2022 06:11), Max: 37.4 (21 Jan 2022 22:55)  T(F): 97.1 (22 Jan 2022 06:11), Max: 99.3 (21 Jan 2022 22:55)  HR: 110 (22 Jan 2022 09:00) (104 - 131)  BP: 116/59 (22 Jan 2022 09:00) (88/57 - 116/59)  BP(mean): 74 (21 Jan 2022 21:00) (68 - 75)  RR: 18 (22 Jan 2022 09:00) (18 - 35)  SpO2: 99% (22 Jan 2022 09:00) (94% - 99%)    MEDICATIONS:  MEDICATIONS  (STANDING):  chlorhexidine 2% Cloths 1 Application(s) Topical <User Schedule>  colchicine 0.6 milliGRAM(s) Oral every 12 hours  dextrose 40% Gel 15 Gram(s) Oral once  dextrose 5%. 1000 milliLiter(s) (50 mL/Hr) IV Continuous <Continuous>  dextrose 5%. 1000 milliLiter(s) (100 mL/Hr) IV Continuous <Continuous>  dextrose 50% Injectable 25 Gram(s) IV Push once  dextrose 50% Injectable 12.5 Gram(s) IV Push once  dextrose 50% Injectable 25 Gram(s) IV Push once  enoxaparin Injectable 40 milliGRAM(s) SubCutaneous every 12 hours  glucagon  Injectable 1 milliGRAM(s) IntraMuscular once  iron sucrose IVPB 300 milliGRAM(s) IV Intermittent every 24 hours  pantoprazole    Tablet 40 milliGRAM(s) Oral before breakfast  sacubitril 24 mG/valsartan 26 mG 1 Tablet(s) Oral every 12 hours  spironolactone 25 milliGRAM(s) Oral every 24 hours    MEDICATIONS  (PRN):  acetaminophen     Tablet .. 650 milliGRAM(s) Oral every 6 hours PRN Mild Pain (1 - 3)  aluminum hydroxide/magnesium hydroxide/simethicone Suspension 30 milliLiter(s) Oral every 4 hours PRN Dyspepsia    ALLERGIES:  No Known Allergies    LABS:                        10.5   8.53  )-----------( 312      ( 22 Jan 2022 07:39 )             34.8     01-22    134<L>  |  101  |  11  ----------------------------<  93  4.0   |  21<L>  |  0.70    Ca    9.1      22 Jan 2022 07:39  Phos  3.1     01-22  Mg     2.6     01-22    TPro  6.8  /  Alb  3.5  /  TBili  0.6  /  DBili  x   /  AST  27  /  ALT  60<H>  /  AlkPhos  134<H>  01-22        CAPILLARY BLOOD GLUCOSE      POCT Blood Glucose.: 103 mg/dL (22 Jan 2022 06:43)      RADIOLOGY & ADDITIONAL TESTS: Reviewed. ***Note in progress***    OVERNIGHT EVENTS: Transferred to cardiac tele unit due to need for bed but will REMAIN under CCU service (same team). NOT signed out.     SUBJECTIVE / INTERVAL HPI: Patient seen and examined at bedside.     Remaining ROS negative     PHYSICAL EXAM:  General: seen out of bed and walking around room, NAD, appears well and comfortable   HEENT: NC/AT; PERRL, anicteric sclera; MMM  Neck: supple, no JVD.  Cardiovascular: +S1/S2, tachycardic rate, regular rhythm, no murmurs   Respiratory: CTA B/L; no W/R/R. satting well on room air. no increased WOB.   Gastrointestinal: soft, NT/ND; +BSx4  Extremities: WWP; no edema, clubbing or cyanosis.   Vascular: 2+ radial, DP pulses B/L  Neurological: AAOx3; no focal deficits  Dermatologic: no appreciable wounds or damage to the skin    VITAL SIGNS:  Vital Signs Last 24 Hrs  T(C): 36.2 (22 Jan 2022 06:11), Max: 37.4 (21 Jan 2022 22:55)  T(F): 97.1 (22 Jan 2022 06:11), Max: 99.3 (21 Jan 2022 22:55)  HR: 110 (22 Jan 2022 09:00) (104 - 131)  BP: 116/59 (22 Jan 2022 09:00) (88/57 - 116/59)  BP(mean): 74 (21 Jan 2022 21:00) (68 - 75)  RR: 18 (22 Jan 2022 09:00) (18 - 35)  SpO2: 99% (22 Jan 2022 09:00) (94% - 99%)    MEDICATIONS:  MEDICATIONS  (STANDING):  chlorhexidine 2% Cloths 1 Application(s) Topical <User Schedule>  colchicine 0.6 milliGRAM(s) Oral every 12 hours  dextrose 40% Gel 15 Gram(s) Oral once  dextrose 5%. 1000 milliLiter(s) (50 mL/Hr) IV Continuous <Continuous>  dextrose 5%. 1000 milliLiter(s) (100 mL/Hr) IV Continuous <Continuous>  dextrose 50% Injectable 25 Gram(s) IV Push once  dextrose 50% Injectable 12.5 Gram(s) IV Push once  dextrose 50% Injectable 25 Gram(s) IV Push once  enoxaparin Injectable 40 milliGRAM(s) SubCutaneous every 12 hours  glucagon  Injectable 1 milliGRAM(s) IntraMuscular once  iron sucrose IVPB 300 milliGRAM(s) IV Intermittent every 24 hours  pantoprazole    Tablet 40 milliGRAM(s) Oral before breakfast  sacubitril 24 mG/valsartan 26 mG 1 Tablet(s) Oral every 12 hours  spironolactone 25 milliGRAM(s) Oral every 24 hours    MEDICATIONS  (PRN):  acetaminophen     Tablet .. 650 milliGRAM(s) Oral every 6 hours PRN Mild Pain (1 - 3)  aluminum hydroxide/magnesium hydroxide/simethicone Suspension 30 milliLiter(s) Oral every 4 hours PRN Dyspepsia    ALLERGIES:  No Known Allergies    LABS:                        10.5   8.53  )-----------( 312      ( 22 Jan 2022 07:39 )             34.8     01-22    134<L>  |  101  |  11  ----------------------------<  93  4.0   |  21<L>  |  0.70    Ca    9.1      22 Jan 2022 07:39  Phos  3.1     01-22  Mg     2.6     01-22    TPro  6.8  /  Alb  3.5  /  TBili  0.6  /  DBili  x   /  AST  27  /  ALT  60<H>  /  AlkPhos  134<H>  01-22    CAPILLARY BLOOD GLUCOSE  POCT Blood Glucose.: 103 mg/dL (22 Jan 2022 06:43)    RADIOLOGY & ADDITIONAL TESTS: Reviewed. HOSPITAL COURSE:  35 yo F w/ no PMHx presents from [place] for further cardiac work up. 2 weeks ago, pt started having back pain (worsened by lying down and sitting up) which prompted her to go to the ED. She was treated for a PNA (azithromycin) and discharged home. Sxs did not improve and she subsequently became hypotensive. In the ED at [place], febrile to 102.4, tachycardic to 140, with EKG showing diffuse ST elevations and tropinemia (71443-->9524). Admitted for cardiac workup. Echo (1/10/22) revealed EF 30-35% with pericardial effusion. Admitted for perimyocarditis and then transferred to [facility] for further work up. At [facility] she received a right heart cath was placed on a balloon pump, milrinone, nipride drip, as well as agressive diuresis with lasix drip. Patient's CI and CO improved oveer several days and balloon pump, milrinone, nipride, and lasix were weaned off.  She was slowly started on spironolactone, toprol, and entresto. Workup for cardiomyopathy found positive covid and coxsackie antibodies. Repeat TTE on 1/19 showed persistently low EF of 15-20% with several akinetic regions. Patient is currently stable on medical therapy and stable for stepdown to telemetry, pending further medical optimization and cardiac MRI.     OVERNIGHT EVENTS: Transferred to cardiac tele unit due to need for bed but will REMAIN under CCU service (same team). NOT signed out.     SUBJECTIVE / INTERVAL HPI: Patient seen and examined at bedside.     Remaining ROS negative     PHYSICAL EXAM:  General: seen out of bed and walking around room, NAD, appears well and comfortable   HEENT: NC/AT; PERRL, anicteric sclera; MMM  Neck: supple, no JVD.  Cardiovascular: +S1/S2, tachycardic rate, regular rhythm, no murmurs   Respiratory: CTA B/L; no W/R/R. satting well on room air. no increased WOB.   Gastrointestinal: soft, NT/ND; +BSx4  Extremities: WWP; no edema, clubbing or cyanosis.   Vascular: 1+ radial, DP pulses B/L  Neurological: AAOx3; no focal deficits  Dermatologic: no appreciable wounds or damage to the skin    VITAL SIGNS:  Vital Signs Last 24 Hrs  T(C): 36.2 (22 Jan 2022 06:11), Max: 37.4 (21 Jan 2022 22:55)  T(F): 97.1 (22 Jan 2022 06:11), Max: 99.3 (21 Jan 2022 22:55)  HR: 110 (22 Jan 2022 09:00) (104 - 131)  BP: 116/59 (22 Jan 2022 09:00) (88/57 - 116/59)  BP(mean): 74 (21 Jan 2022 21:00) (68 - 75)  RR: 18 (22 Jan 2022 09:00) (18 - 35)  SpO2: 99% (22 Jan 2022 09:00) (94% - 99%)    MEDICATIONS:  MEDICATIONS  (STANDING):  chlorhexidine 2% Cloths 1 Application(s) Topical <User Schedule>  colchicine 0.6 milliGRAM(s) Oral every 12 hours  dextrose 40% Gel 15 Gram(s) Oral once  dextrose 5%. 1000 milliLiter(s) (50 mL/Hr) IV Continuous <Continuous>  dextrose 5%. 1000 milliLiter(s) (100 mL/Hr) IV Continuous <Continuous>  dextrose 50% Injectable 25 Gram(s) IV Push once  dextrose 50% Injectable 12.5 Gram(s) IV Push once  dextrose 50% Injectable 25 Gram(s) IV Push once  enoxaparin Injectable 40 milliGRAM(s) SubCutaneous every 12 hours  glucagon  Injectable 1 milliGRAM(s) IntraMuscular once  iron sucrose IVPB 300 milliGRAM(s) IV Intermittent every 24 hours  pantoprazole    Tablet 40 milliGRAM(s) Oral before breakfast  sacubitril 24 mG/valsartan 26 mG 1 Tablet(s) Oral every 12 hours  spironolactone 25 milliGRAM(s) Oral every 24 hours    MEDICATIONS  (PRN):  acetaminophen     Tablet .. 650 milliGRAM(s) Oral every 6 hours PRN Mild Pain (1 - 3)  aluminum hydroxide/magnesium hydroxide/simethicone Suspension 30 milliLiter(s) Oral every 4 hours PRN Dyspepsia    ALLERGIES:  No Known Allergies    LABS:                        10.5   8.53  )-----------( 312      ( 22 Jan 2022 07:39 )             34.8     01-22    134<L>  |  101  |  11  ----------------------------<  93  4.0   |  21<L>  |  0.70    Ca    9.1      22 Jan 2022 07:39  Phos  3.1     01-22  Mg     2.6     01-22    TPro  6.8  /  Alb  3.5  /  TBili  0.6  /  DBili  x   /  AST  27  /  ALT  60<H>  /  AlkPhos  134<H>  01-22    CAPILLARY BLOOD GLUCOSE  POCT Blood Glucose.: 103 mg/dL (22 Jan 2022 06:43)    RADIOLOGY & ADDITIONAL TESTS: Reviewed. HOSPITAL COURSE:  37 yo F w/ no PMHx presents from [place] for further cardiac work up. 2 weeks ago, pt started having back pain (worsened by lying down and sitting up) which prompted her to go to the ED. She was treated for a PNA (azithromycin) and discharged home. Sxs did not improve and she subsequently became hypotensive. In the ED at [place], febrile to 102.4, tachycardic to 140, with EKG showing diffuse ST elevations and tropinemia (28226-->9524). Admitted for cardiac workup. Echo (1/10/22) revealed EF 30-35% with pericardial effusion. Admitted for perimyocarditis and then transferred to [facility] for further work up. At [facility] she received a right heart cath was placed on a balloon pump, milrinone, nipride drip, as well as agressive diuresis with lasix drip. Patient's CI and CO improved oveer several days and balloon pump, milrinone, nipride, and lasix were weaned off.  She was slowly started on spironolactone, toprol, and entresto. Workup for cardiomyopathy found positive covid and coxsackie antibodies. Repeat TTE on 1/19 showed persistently low EF of 15-20% with several akinetic regions. Patient is currently stable on medical therapy and stable for stepdown to telemetry, pending further medical optimization and cardiac MRI.     OVERNIGHT EVENTS: Transferred to cardiac tele unit due to need for bed but will REMAIN under CCU service (same team). NOT signed out.     SUBJECTIVE / INTERVAL HPI: Patient seen and examined at bedside. Feeling well today, no SOB, no lightheadedness, no chest pain. She has been walking by herself.    Remaining ROS negative     PHYSICAL EXAM:  General: seen out of bed and walking around room, NAD, appears well and comfortable   HEENT: NC/AT; PERRL, anicteric sclera; MMM  Neck: supple, no JVD.  Cardiovascular: +S1/S2, tachycardic rate, regular rhythm, no murmurs   Respiratory: CTA B/L; no W/R/R. satting well on room air. no increased WOB.   Gastrointestinal: soft, NT/ND; +BSx4  Extremities: WWP; no edema, clubbing or cyanosis.   Vascular: 1+ radial, DP pulses B/L  Neurological: AAOx3; no focal deficits  Dermatologic: no appreciable wounds or damage to the skin    VITAL SIGNS:  Vital Signs Last 24 Hrs  T(C): 36.2 (22 Jan 2022 06:11), Max: 37.4 (21 Jan 2022 22:55)  T(F): 97.1 (22 Jan 2022 06:11), Max: 99.3 (21 Jan 2022 22:55)  HR: 110 (22 Jan 2022 09:00) (104 - 131)  BP: 116/59 (22 Jan 2022 09:00) (88/57 - 116/59)  BP(mean): 74 (21 Jan 2022 21:00) (68 - 75)  RR: 18 (22 Jan 2022 09:00) (18 - 35)  SpO2: 99% (22 Jan 2022 09:00) (94% - 99%)    MEDICATIONS:  MEDICATIONS  (STANDING):  chlorhexidine 2% Cloths 1 Application(s) Topical <User Schedule>  colchicine 0.6 milliGRAM(s) Oral every 12 hours  dextrose 40% Gel 15 Gram(s) Oral once  dextrose 5%. 1000 milliLiter(s) (50 mL/Hr) IV Continuous <Continuous>  dextrose 5%. 1000 milliLiter(s) (100 mL/Hr) IV Continuous <Continuous>  dextrose 50% Injectable 25 Gram(s) IV Push once  dextrose 50% Injectable 12.5 Gram(s) IV Push once  dextrose 50% Injectable 25 Gram(s) IV Push once  enoxaparin Injectable 40 milliGRAM(s) SubCutaneous every 12 hours  glucagon  Injectable 1 milliGRAM(s) IntraMuscular once  iron sucrose IVPB 300 milliGRAM(s) IV Intermittent every 24 hours  pantoprazole    Tablet 40 milliGRAM(s) Oral before breakfast  sacubitril 24 mG/valsartan 26 mG 1 Tablet(s) Oral every 12 hours  spironolactone 25 milliGRAM(s) Oral every 24 hours    MEDICATIONS  (PRN):  acetaminophen     Tablet .. 650 milliGRAM(s) Oral every 6 hours PRN Mild Pain (1 - 3)  aluminum hydroxide/magnesium hydroxide/simethicone Suspension 30 milliLiter(s) Oral every 4 hours PRN Dyspepsia    ALLERGIES:  No Known Allergies    LABS:                        10.5   8.53  )-----------( 312      ( 22 Jan 2022 07:39 )             34.8     01-22    134<L>  |  101  |  11  ----------------------------<  93  4.0   |  21<L>  |  0.70    Ca    9.1      22 Jan 2022 07:39  Phos  3.1     01-22  Mg     2.6     01-22    TPro  6.8  /  Alb  3.5  /  TBili  0.6  /  DBili  x   /  AST  27  /  ALT  60<H>  /  AlkPhos  134<H>  01-22    CAPILLARY BLOOD GLUCOSE  POCT Blood Glucose.: 103 mg/dL (22 Jan 2022 06:43)    RADIOLOGY & ADDITIONAL TESTS: Reviewed. HOSPITAL COURSE:  35 yo F w/ no PMHx presents from [place] for further cardiac work up. 2 weeks ago, pt started having back pain (worsened by lying down and sitting up) which prompted her to go to the ED. She was treated for a PNA (azithromycin) and discharged home. Sxs did not improve and she subsequently became hypotensive. In the ED at [place], febrile to 102.4, tachycardic to 140, with EKG showing diffuse ST elevations and tropinemia (01102-->9524). Admitted for cardiac workup. Echo (1/10/22) revealed EF 30-35% with pericardial effusion. Admitted for perimyocarditis and then transferred to [facility] for further work up. At [facility] she received a right heart cath was placed on a balloon pump, milrinone, nipride drip, as well as agressive diuresis with lasix drip. Patient's CI and CO improved oveer several days and balloon pump, milrinone, nipride, and lasix were weaned off.  She was slowly started on spironolactone, toprol, and entresto. Workup for cardiomyopathy found positive covid and coxsackie antibodies. Repeat TTE on 1/19 showed persistently low EF of 15-20% with several akinetic regions. Patient is currently stable on medical therapy and stable for stepdown to telemetry, pending further medical optimization and cardiac MRI.     OVERNIGHT EVENTS: Transferred to cardiac tele unit due to need for bed but will REMAIN under CCU service (same team). NOT signed out.     SUBJECTIVE / INTERVAL HPI: Patient seen and examined at bedside. Feeling well today, no SOB, no lightheadedness, no chest pain. She has been walking by herself.    Remaining ROS negative     PHYSICAL EXAM:  General: in bed, NAD, appears well and comfortable   HEENT: NC/AT; PERRL, anicteric sclera; MMM  Neck: supple, no JVD.  Cardiovascular: +S1/S2, tachycardic rate, regular rhythm, no murmurs   Respiratory: CTA B/L; no W/R/R. satting well on room air. no increased WOB.   Gastrointestinal: soft, NT/ND; +BSx4  Extremities: WWP; no edema, clubbing or cyanosis.   Vascular: 1+ radial, DP pulses B/L  Neurological: AAOx3; no focal deficits  Dermatologic: no appreciable wounds or damage to the skin    VITAL SIGNS:  Vital Signs Last 24 Hrs  T(C): 36.2 (22 Jan 2022 06:11), Max: 37.4 (21 Jan 2022 22:55)  T(F): 97.1 (22 Jan 2022 06:11), Max: 99.3 (21 Jan 2022 22:55)  HR: 110 (22 Jan 2022 09:00) (104 - 131)  BP: 116/59 (22 Jan 2022 09:00) (88/57 - 116/59)  BP(mean): 74 (21 Jan 2022 21:00) (68 - 75)  RR: 18 (22 Jan 2022 09:00) (18 - 35)  SpO2: 99% (22 Jan 2022 09:00) (94% - 99%)    MEDICATIONS:  MEDICATIONS  (STANDING):  chlorhexidine 2% Cloths 1 Application(s) Topical <User Schedule>  colchicine 0.6 milliGRAM(s) Oral every 12 hours  dextrose 40% Gel 15 Gram(s) Oral once  dextrose 5%. 1000 milliLiter(s) (50 mL/Hr) IV Continuous <Continuous>  dextrose 5%. 1000 milliLiter(s) (100 mL/Hr) IV Continuous <Continuous>  dextrose 50% Injectable 25 Gram(s) IV Push once  dextrose 50% Injectable 12.5 Gram(s) IV Push once  dextrose 50% Injectable 25 Gram(s) IV Push once  enoxaparin Injectable 40 milliGRAM(s) SubCutaneous every 12 hours  glucagon  Injectable 1 milliGRAM(s) IntraMuscular once  iron sucrose IVPB 300 milliGRAM(s) IV Intermittent every 24 hours  pantoprazole    Tablet 40 milliGRAM(s) Oral before breakfast  sacubitril 24 mG/valsartan 26 mG 1 Tablet(s) Oral every 12 hours  spironolactone 25 milliGRAM(s) Oral every 24 hours    MEDICATIONS  (PRN):  acetaminophen     Tablet .. 650 milliGRAM(s) Oral every 6 hours PRN Mild Pain (1 - 3)  aluminum hydroxide/magnesium hydroxide/simethicone Suspension 30 milliLiter(s) Oral every 4 hours PRN Dyspepsia    ALLERGIES:  No Known Allergies    LABS:                        10.5   8.53  )-----------( 312      ( 22 Jan 2022 07:39 )             34.8     01-22    134<L>  |  101  |  11  ----------------------------<  93  4.0   |  21<L>  |  0.70    Ca    9.1      22 Jan 2022 07:39  Phos  3.1     01-22  Mg     2.6     01-22    TPro  6.8  /  Alb  3.5  /  TBili  0.6  /  DBili  x   /  AST  27  /  ALT  60<H>  /  AlkPhos  134<H>  01-22    CAPILLARY BLOOD GLUCOSE  POCT Blood Glucose.: 103 mg/dL (22 Jan 2022 06:43)    RADIOLOGY & ADDITIONAL TESTS: Reviewed.

## 2022-01-22 NOTE — PROGRESS NOTE ADULT - ATTENDING COMMENTS
36F, obese, no significant PMH w/ recent [facility] ER visit ~2weeks for PNA, tested positive for Covid, sent home on oral abx. 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 s/p Significant IV diuresis on Lasix gtt, now d/c'd; Weaned off Nipride on 1/18. She has now been off Milrinone 0.125mcg since yesterday AM; Currently on Valsartan 20 BID and Spironolactone 25mg daily; This AM she remains warm on exam, Lactate WNL, w/ good end-organ function and euvolemic; Plan to transition Valsartan to Entresto 24/26 BID. Start Toprol 12.5 daily, c/w Spironolactone 25 daily - Holding parameters MAP < 60 on two separate BP checks and provider should be notified. It is imperative that these meds are not intermittently held w/o good reason; Please obtain cMRI on Monday; Please check Lactate w/ AM labs(CMP/CBC); Advanced HF following, appreciate recs  -OOB to chair / PT / incentive spirometry  -DASH diet  -DVT PPx  -Dispo: SD to Cardiac Tele  -Full Code    Sebastián Humphries MD  CCU Attending

## 2022-01-22 NOTE — PROGRESS NOTE ADULT - SUBJECTIVE AND OBJECTIVE BOX
CCU to [facility] Stepdown Acceptance Note    Hospital course  35 yo F w/ no PMHx presents from [place] for further cardiac work up. 2 weeks ago, pt started having back pain (worsened by lying down and sitting up) which prompted her to go to the ED. She was treated for a PNA (azithromycin) and discharged home. Sxs did not improve and she subsequently became hypotensive. In the ED at [place], febrile to 102.4, tachycardic to 140, with EKG showing diffuse ST elevations and tropinemia (03377-->9524). Admitted for cardiac workup. Echo (1/10/22) revealed EF 30-35% with pericardial effusion. Admitted for perimyocarditis and then transferred to [facility] for further work up. At [facility] she received a right heart cath was placed on a balloon pump, milrinone, nipride drip, as well as agressive diuresis with lasix drip. Patient's CI and CO improved oveer several days and balloon pump, milrinone, nipride, and lasix were weaned off.  She was slowly started on spironolactone, toprol, and entresto. Workup for cardiomyopathy found positive covid and coxsackie antibodies. Repeat TTE on 1/19 showed persistently low EF of 15-20% with several akinetic regions. Patient is currently stable on medical therapy and stable for stepdown to telemetry, pending further medical optimization and cardiac MRI.     SUBJECTIVE / INTERVAL HPI: Patient seen and examined at bedside. Comfortable, denies CP, SOB, dizziness/lightheadedness, palpitations    VITAL SIGNS:  Vital Signs Last 24 Hrs  T(C): 36.2 (22 Jan 2022 06:11), Max: 37.4 (21 Jan 2022 22:55)  T(F): 97.1 (22 Jan 2022 06:11), Max: 99.3 (21 Jan 2022 22:55)  HR: 110 (22 Jan 2022 09:00) (104 - 131)  BP: 116/59 (22 Jan 2022 09:00) (88/61 - 116/59)  BP(mean): 74 (21 Jan 2022 21:00) (68 - 75)  RR: 18 (22 Jan 2022 09:00) (18 - 35)  SpO2: 99% (22 Jan 2022 09:00) (94% - 99%)      I&O's Summary    21 Jan 2022 07:01  -  22 Jan 2022 07:00  --------------------------------------------------------  IN: 513.1 mL / OUT: 1530 mL / NET: -1016.9 mL    PHYSICAL EXAM:  General: sitting up in bed, NAD  HEENT: conjunctiva clear; MMM  Neck: supple, no JVD  Cardiovascular: tachy, no murmurs  Respiratory: CTA B/L  Gastrointestinal: soft, NT/ND, +BS  Extremities: WWP, no edema or cyanosis  Vascular: Peripheral pulses palpable 2+ bilaterally  Neurological: AAOx3, no focal deficits    MEDICATIONS:  MEDICATIONS  (STANDING):  chlorhexidine 2% Cloths 1 Application(s) Topical <User Schedule>  colchicine 0.6 milliGRAM(s) Oral every 12 hours  enoxaparin Injectable 40 milliGRAM(s) SubCutaneous every 12 hours  pantoprazole    Tablet 40 milliGRAM(s) Oral before breakfast  sacubitril 24 mG/valsartan 26 mG 1 Tablet(s) Oral every 12 hours    MEDICATIONS  (PRN):  acetaminophen     Tablet .. 650 milliGRAM(s) Oral every 6 hours PRN Mild Pain (1 - 3)  aluminum hydroxide/magnesium hydroxide/simethicone Suspension 30 milliLiter(s) Oral every 4 hours PRN Dyspepsia      LABS:                        10.5   8.53  )-----------( 312      ( 22 Jan 2022 07:39 )             34.8       01-22    134<L>  |  101  |  11  ----------------------------<  93  4.0   |  21<L>  |  0.70    Ca    9.1      22 Jan 2022 07:39  Phos  3.1     01-22  Mg     2.6     01-22    TPro  6.8  /  Alb  3.5  /  TBili  0.6  /  DBili  x   /  AST  27  /  ALT  60<H>  /  AlkPhos  134<H>  01-22      TELEMETRY: sinus tach    RADIOLOGY & ADDITIONAL TESTS: Reviewed.

## 2022-01-22 NOTE — PROGRESS NOTE ADULT - ASSESSMENT
Ms Harrell is a 35 yo F w/ no PMHx admitted to [place] for perimyocarditis and then transferred to [facility] for further work up and management of cardiogenic shock.    Neuro  AAOx3, No active issues    Cardiac  #Acute HFrEF 2/2 perimyocarditis  Lactate now cleared, LFTs downtrending, Cr wnl.   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 balloon pump placed 1/14; removed 1/18 and transitioned to milrinone as cardiac output improved   -HF following , recs appreciated   -d/c milrinone today and monitor labs/hemodynamics off of it   -holding diuresis as patient euvolemic   -c/w spironolactone 25mg daily    -c/w valsartan 20mg BID for afterload reduction ; if BPs increase can transition to entresto    -holding beta blocker for now given hypotension with reflex tachycardia; consider adding toprol tomorrow if pressures better  -strict I/Os  -f/u cardiac MRI  -repeat TTE 1/19 showing severely reduced left ventricular systolic function with EF 15-20% and akinetic apical septal segment, apical lateral segment, and apex. 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 as covid abs positive for recent infection   -c/w colchicine 0.6mg BID  -f/u cardiac MRI    -c/w HF management as above     Pulm  -patient satting well on RA without increased WOB  -c/w incentive spirometry for atelectasis   -CTA negative for PE    Renal  #Hyponatremia  Patient persistently hyponatremic since admission   -patient asymptomatic  -can no longer attribute to hypervolemia as patient as been diuresing well since admission; likely added component of poor PO intake   -Continue to monitor BMP     GI  #Transaminitis   Patient with ALP/ALT remaining elevated; likely 2/2 congestive hepatopathy versus ischemia i/s/o cardiogenic shock  -abd US with 2 hypoechoic liver lesions c/w hemangiomas; f/u outpatient    -continue to monitor  -hepatitis panel negative    Heme-Onc  -no active issues      Endocrine  No active issues    ID  No active issues    Other  F: none  E: replete K<4, Mg<2  N: DASH/TLC diet with ensure     VTE Prophylaxis: lovenox   GI: Pantoprazole 40mg PO daily  C: Full Code  D: CCU    lines: right radial A line   Ms Harrell is a 35 yo F w/ no PMHx admitted to [place] for perimyocarditis and then transferred to [facility] for further work up and management of cardiogenic shock.    Neuro  AAOx3, No active issues    Cardiac  #Acute HFrEF 2/2 perimyocarditis  Lactate now cleared, LFTs downtrending, Cr wnl.   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 balloon pump placed 1/14; removed 1/18 and transitioned to milrinone as cardiac output improved   -HF following , recs appreciated   -d/c milrinone today and monitor labs/hemodynamics off of it   -holding diuresis as patient euvolemic   -c/w spironolactone 25mg daily    -d/c valsartan 20mg BID for afterload reduction ; transition to Entresto 24/26mg BID.   -start with Toprol XL 12.5mg QD tomorrow if pressures better  -strict I/Os  -f/u cardiac MRI  -repeat TTE 1/19 showing severely reduced left ventricular systolic function with EF 15-20% and akinetic apical septal segment, apical lateral segment, and apex. 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 as covid abs positive for recent infection   -c/w colchicine 0.6mg BID  -f/u cardiac MRI    -c/w HF management as above     Pulm  -patient satting well on RA without increased WOB  -c/w incentive spirometry for atelectasis   -CTA negative for PE    Renal  #Hyponatremia  Patient persistently hyponatremic since admission   -patient asymptomatic  -can no longer attribute to hypervolemia as patient as been diuresing well since admission; likely added component of poor PO intake   -Continue to monitor BMP     GI  #Transaminitis   Patient with ALP/ALT remaining elevated; likely 2/2 congestive hepatopathy versus ischemia i/s/o cardiogenic shock  -abd US with 2 hypoechoic liver lesions c/w hemangiomas; f/u outpatient    -continue to monitor  -hepatitis panel negative    Heme-Onc  -no active issues      Endocrine  No active issues    ID  No active issues    Other  F: none  E: replete K<4, Mg<2  N: DASH/TLC diet with ensure     VTE Prophylaxis: lovenox   GI: Pantoprazole 40mg PO daily  C: Full Code  D: CCU    lines: right radial A line   Ms Harrell is a 37 yo F w/ no PMHx admitted to [place] for perimyocarditis and then transferred to [facility] for further work up and management of cardiogenic shock.    Neuro  AAOx3, No active issues    Cardiac  #Acute HFrEF 2/2 perimyocarditis  Lactate now cleared, LFTs downtrending, Cr wnl.   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 balloon pump placed 1/14; removed 1/18 and transitioned to milrinone as cardiac output improved   -HF following , recs appreciated   -d/c milrinone today and monitor labs/hemodynamics off of it   -holding diuresis as patient euvolemic   -c/w spironolactone 25mg daily    -d/c valsartan 20mg BID for afterload reduction ; transition to Entresto 24/26mg BID (hold parameters MAP<60, provider must be notified before suspend a dose).   -start with Toprol XL 12.5mg QD tomorrow if pressures better  -strict I/Os  -f/u cardiac MRI  -f/u lactate  -repeat TTE 1/19 showing severely reduced left ventricular systolic function with EF 15-20% and akinetic apical septal segment, apical lateral segment, and apex. normal RV size/function; no changes compared to the previous TTE performed on 1/14/2020  -IV iron sucrose 300mg x3 days   -plan to repeat TTE monday to evaluate for progress     #Perimyocarditis   Patient presenting with pleuritic chest pain with associated myalgias, chills, cough, fever, and generalized fatigue with elevation in troponins and ESR/CRP  EKG sinus tachycardia with diffuse ST elevations, improved from prior   Etiology likely Covid myocarditis as covid abs positive for recent infection   CxR: Mild improvement, less congestive.  -c/w colchicine 0.6mg BID  -f/u cardiac MRI    -c/w HF management as above     Pulm  -patient satting well on RA without increased WOB  -c/w incentive spirometry for atelectasis   -CTA negative for PE    Renal  #Hyponatremia  Patient persistently hyponatremic since admission   -patient asymptomatic  -can no longer attribute to hypervolemia as patient as been diuresing well since admission; likely added component of poor PO intake   -Continue to monitor BMP     GI  #Transaminitis   Patient with ALP/ALT remaining elevated; likely 2/2 congestive hepatopathy versus ischemia i/s/o cardiogenic shock. Trending down.  -abd US with 2 hypoechoic liver lesions c/w hemangiomas; f/u outpatient    -continue to monitor  -hepatitis panel negative    Heme-Onc  No active issues      Endocrine  No active issues    ID  No active issues    Other  F: none  E: replete K<4, Mg<2  N: DASH/TLC diet with ensure     VTE Prophylaxis: Lovenox 40mg SQ q12h.  GI: Pantoprazole 40mg PO daily  C: Full Code  D: CCU    lines: right radial A line

## 2022-01-23 LAB
ALBUMIN SERPL ELPH-MCNC: 3.7 G/DL — SIGNIFICANT CHANGE UP (ref 3.3–5)
ALP SERPL-CCNC: 148 U/L — HIGH (ref 40–120)
ALT FLD-CCNC: 70 U/L — HIGH (ref 10–45)
ANION GAP SERPL CALC-SCNC: 11 MMOL/L — SIGNIFICANT CHANGE UP (ref 5–17)
AST SERPL-CCNC: 36 U/L — SIGNIFICANT CHANGE UP (ref 10–40)
BASOPHILS # BLD AUTO: 0.09 K/UL — SIGNIFICANT CHANGE UP (ref 0–0.2)
BASOPHILS NFR BLD AUTO: 1.4 % — SIGNIFICANT CHANGE UP (ref 0–2)
BILIRUB SERPL-MCNC: 0.7 MG/DL — SIGNIFICANT CHANGE UP (ref 0.2–1.2)
BUN SERPL-MCNC: 11 MG/DL — SIGNIFICANT CHANGE UP (ref 7–23)
CALCIUM SERPL-MCNC: 9.4 MG/DL — SIGNIFICANT CHANGE UP (ref 8.4–10.5)
CHLORIDE SERPL-SCNC: 103 MMOL/L — SIGNIFICANT CHANGE UP (ref 96–108)
CO2 SERPL-SCNC: 22 MMOL/L — SIGNIFICANT CHANGE UP (ref 22–31)
CREAT SERPL-MCNC: 0.73 MG/DL — SIGNIFICANT CHANGE UP (ref 0.5–1.3)
EOSINOPHIL # BLD AUTO: 0.13 K/UL — SIGNIFICANT CHANGE UP (ref 0–0.5)
EOSINOPHIL NFR BLD AUTO: 2 % — SIGNIFICANT CHANGE UP (ref 0–6)
GLUCOSE SERPL-MCNC: 94 MG/DL — SIGNIFICANT CHANGE UP (ref 70–99)
HCT VFR BLD CALC: 34.6 % — SIGNIFICANT CHANGE UP (ref 34.5–45)
HGB BLD-MCNC: 10.3 G/DL — LOW (ref 11.5–15.5)
IMM GRANULOCYTES NFR BLD AUTO: 0.6 % — SIGNIFICANT CHANGE UP (ref 0–1.5)
LACTATE SERPL-SCNC: 0.8 MMOL/L — SIGNIFICANT CHANGE UP (ref 0.5–2)
LYMPHOCYTES # BLD AUTO: 1.49 K/UL — SIGNIFICANT CHANGE UP (ref 1–3.3)
LYMPHOCYTES # BLD AUTO: 23.4 % — SIGNIFICANT CHANGE UP (ref 13–44)
MAGNESIUM SERPL-MCNC: 2.3 MG/DL — SIGNIFICANT CHANGE UP (ref 1.6–2.6)
MCHC RBC-ENTMCNC: 28 PG — SIGNIFICANT CHANGE UP (ref 27–34)
MCHC RBC-ENTMCNC: 29.8 GM/DL — LOW (ref 32–36)
MCV RBC AUTO: 94 FL — SIGNIFICANT CHANGE UP (ref 80–100)
MONOCYTES # BLD AUTO: 0.45 K/UL — SIGNIFICANT CHANGE UP (ref 0–0.9)
MONOCYTES NFR BLD AUTO: 7.1 % — SIGNIFICANT CHANGE UP (ref 2–14)
NEUTROPHILS # BLD AUTO: 4.18 K/UL — SIGNIFICANT CHANGE UP (ref 1.8–7.4)
NEUTROPHILS NFR BLD AUTO: 65.5 % — SIGNIFICANT CHANGE UP (ref 43–77)
NRBC # BLD: 0 /100 WBCS — SIGNIFICANT CHANGE UP (ref 0–0)
PHOSPHATE SERPL-MCNC: 3.7 MG/DL — SIGNIFICANT CHANGE UP (ref 2.5–4.5)
PLATELET # BLD AUTO: 333 K/UL — SIGNIFICANT CHANGE UP (ref 150–400)
POTASSIUM SERPL-MCNC: 4.2 MMOL/L — SIGNIFICANT CHANGE UP (ref 3.5–5.3)
POTASSIUM SERPL-SCNC: 4.2 MMOL/L — SIGNIFICANT CHANGE UP (ref 3.5–5.3)
PROT SERPL-MCNC: 7.4 G/DL — SIGNIFICANT CHANGE UP (ref 6–8.3)
RBC # BLD: 3.68 M/UL — LOW (ref 3.8–5.2)
RBC # FLD: 17.6 % — HIGH (ref 10.3–14.5)
SODIUM SERPL-SCNC: 136 MMOL/L — SIGNIFICANT CHANGE UP (ref 135–145)
WBC # BLD: 6.38 K/UL — SIGNIFICANT CHANGE UP (ref 3.8–10.5)
WBC # FLD AUTO: 6.38 K/UL — SIGNIFICANT CHANGE UP (ref 3.8–10.5)

## 2022-01-23 PROCEDURE — 99232 SBSQ HOSP IP/OBS MODERATE 35: CPT

## 2022-01-23 PROCEDURE — 71045 X-RAY EXAM CHEST 1 VIEW: CPT | Mod: 26

## 2022-01-23 RX ORDER — BACITRACIN ZINC 500 UNIT/G
1 OINTMENT IN PACKET (EA) TOPICAL ONCE
Refills: 0 | Status: COMPLETED | OUTPATIENT
Start: 2022-01-23 | End: 2022-01-23

## 2022-01-23 RX ORDER — ENOXAPARIN SODIUM 100 MG/ML
40 INJECTION SUBCUTANEOUS EVERY 12 HOURS
Refills: 0 | Status: DISCONTINUED | OUTPATIENT
Start: 2022-01-23 | End: 2022-01-25

## 2022-01-23 RX ORDER — ENOXAPARIN SODIUM 100 MG/ML
40 INJECTION SUBCUTANEOUS EVERY 24 HOURS
Refills: 0 | Status: DISCONTINUED | OUTPATIENT
Start: 2022-01-23 | End: 2022-01-23

## 2022-01-23 RX ADMIN — PANTOPRAZOLE SODIUM 40 MILLIGRAM(S): 20 TABLET, DELAYED RELEASE ORAL at 06:18

## 2022-01-23 RX ADMIN — SPIRONOLACTONE 25 MILLIGRAM(S): 25 TABLET, FILM COATED ORAL at 09:41

## 2022-01-23 RX ADMIN — ENOXAPARIN SODIUM 40 MILLIGRAM(S): 100 INJECTION SUBCUTANEOUS at 17:39

## 2022-01-23 RX ADMIN — Medication 0.6 MILLIGRAM(S): at 16:17

## 2022-01-23 RX ADMIN — SACUBITRIL AND VALSARTAN 1 TABLET(S): 24; 26 TABLET, FILM COATED ORAL at 21:39

## 2022-01-23 RX ADMIN — Medication 1 APPLICATION(S): at 10:57

## 2022-01-23 RX ADMIN — Medication 12.5 MILLIGRAM(S): at 10:45

## 2022-01-23 RX ADMIN — SACUBITRIL AND VALSARTAN 1 TABLET(S): 24; 26 TABLET, FILM COATED ORAL at 09:40

## 2022-01-23 RX ADMIN — ENOXAPARIN SODIUM 40 MILLIGRAM(S): 100 INJECTION SUBCUTANEOUS at 06:18

## 2022-01-23 NOTE — PROGRESS NOTE ADULT - ATTENDING COMMENTS
CMR reports NL myocardium and NL wall motion (preliminary report), will update echo tomorrow to reassess wall motion.   Pt has sinus tach  Continue current meds

## 2022-01-23 NOTE — PROGRESS NOTE ADULT - PROBLEM SELECTOR PLAN 6
-likely 2/2 congestive hepatopathy vs ischemia i/s/o cardiogenic shock  -now downtrending  -hepatitis panel negative  -abd US with 2 hypoechoic liver lesions c/w hemangiomas; f/u outpatient    -continue to monitor    #DVT PPx- SQ lovenox    #Dispo- pending medication optimization, cardiac MRI    Case d/w Dr. Beck RESOLVING. -likely 2/2 congestive hepatopathy vs ischemia i/s/o cardiogenic shock  -hepatitis panel negative  -abd US with 2 hypoechoic liver lesions c/w hemangiomas; f/u outpatient    -continue to monitor    #DVT PPx- SQ lovenox  #Dispo- pending medication optimization, cardiac MRI    Case d/w Dr. Beck

## 2022-01-23 NOTE — PROGRESS NOTE ADULT - PROBLEM SELECTOR PLAN 4
-hgb 8-9s earlier in admission, improving to 10s  -iron sat 14%  -s/p venofer 300mg x3 days  -consider PO iron supplement on discharge STABLE. -hgb 8-9s earlier in admission, improving to 10s  -iron sat 14%  -s/p venofer 300mg x3 days  -consider PO iron supplement on discharge

## 2022-01-23 NOTE — PROGRESS NOTE ADULT - ASSESSMENT
36-year-old female PMHx morbid obesity, chronic anemia from menorrhagia, initially admitted to [place] 1/9/22 for acute systolic CHF likely 2/2 myocarditis c/b cardiogenic shock; transferred to [facility] CCU 1/13/22 for further management; s/p pressor support and IABP, initiated GDMT, stepped down to 5 [place] 1/22 for continued medical optimization; cardiac MRI preliminary read unremarkable. Continuing optimization of GDMT. Remains hemodynamically stable.

## 2022-01-23 NOTE — CHART NOTE - NSCHARTNOTEFT_GEN_A_CORE
Admitting Diagnosis:   Patient is a 36y old  Female who presents with a chief complaint of cardiogenic shock (19 Jan 2022 11:40)      PAST MEDICAL & SURGICAL HISTORY:  No pertinent past medical history    No significant past surgical history        Current Nutrition Order:  DASH TLC, ensure pudding x/1day 170kcal/4gm prot      PO Intake: Good (%) [   ]  Fair (50-75%) [ x-50%  ] Poor (<25%) [   ]    GI Issues:   Denies NVDC, +BM 1/19   Protonix and MAALOX ordered     Pain:  none per flow sheets     Skin Integrity:  Thanh 16  1+Gen edema  No pressure ulcers      Labs:   01-20    132<L>  |  93<L>  |  13  ----------------------------<  101<H>  3.7   |  26  |  0.73    Ca    9.2      20 Jan 2022 05:01  Phos  3.7     01-20  Mg     2.5     01-20    TPro  7.0  /  Alb  3.7  /  TBili  0.4  /  DBili  x   /  AST  31  /  ALT  75<H>  /  AlkPhos  162<H>  01-20    CAPILLARY BLOOD GLUCOSE          Medications:  MEDICATIONS  (STANDING):  chlorhexidine 2% Cloths 1 Application(s) Topical <User Schedule>  colchicine 0.6 milliGRAM(s) Oral every 12 hours  dextrose 40% Gel 15 Gram(s) Oral once  dextrose 5%. 1000 milliLiter(s) (50 mL/Hr) IV Continuous <Continuous>  dextrose 5%. 1000 milliLiter(s) (100 mL/Hr) IV Continuous <Continuous>  dextrose 50% Injectable 25 Gram(s) IV Push once  dextrose 50% Injectable 12.5 Gram(s) IV Push once  dextrose 50% Injectable 25 Gram(s) IV Push once  enoxaparin Injectable 40 milliGRAM(s) SubCutaneous every 12 hours  glucagon  Injectable 1 milliGRAM(s) IntraMuscular once  influenza   Vaccine 0.5 milliLiter(s) IntraMuscular once  iron sucrose IVPB 300 milliGRAM(s) IV Intermittent every 24 hours  milrinone Infusion 0.125 MICROgram(s)/kG/Min (3.7 mL/Hr) IV Continuous <Continuous>  pantoprazole    Tablet 40 milliGRAM(s) Oral before breakfast  spironolactone 25 milliGRAM(s) Oral every 24 hours  valsartan 20 milliGRAM(s) Oral every 12 hours    MEDICATIONS  (PRN):  acetaminophen     Tablet .. 650 milliGRAM(s) Oral every 6 hours PRN Mild Pain (1 - 3)  aluminum hydroxide/magnesium hydroxide/simethicone Suspension 30 milliLiter(s) Oral every 4 hours PRN Dyspepsia      Weight Assessment:  · Height for BMI (FEET)	5 Feet  · Height for BMI (INCHES)	1 Inch(s)  · Height for BMI (CENTIMETERS)	154.94 Centimeter(s)  · Weight for BMI (lbs)	217 lb  · Weight for BMI (kg)	98.4 kg  · Body Mass Index	40.9  · Ideal Body Weight (lbs)	105  · Ideal Body Weight (kg)	47.6    Weight Change:   1/20 193.5 pounds     Estimated energy needs:   IBW For energy needs due to pt's current body weight exceeding 120% of IBW (%MBY=394)  Adjusted for age/BMI, CHF, Fluids per team     Estimated Energy Needs:  · Weight (lbs)	105 lb  · Weight (kg)	47.6 kg  · Enter From (rhea/kg)	30  · Enter To (rhea/kg)	35  · Calculated From (rhea/kg)	1428  · Calculated To (rhea/kg)	1666     Estimated Protein Needs:  · Weight (lbs)	105 lb  · Weight (kg)	47.6 kg  · Enter From (g/kg)	1.2  · Enter To (g/kg)	1.4  · Calculated From (g/kg)	57.12  · Calculated To (g/kg)	66.64    Subjective: 37 yo F no PMHx admitted to [place] for perimyocarditis and then transferred to [facility] for further work up and management of cardiogenic shock. 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 S/p TTE 1/13 with severely reduced LV systolic function with EF 15% and RWMA; normal RV function, no valve disease. S/p L/R heart cath 1/14 showing clear coronaries on [facility], however RHC with elevated left and right filling pressures. Balloon pump placed 1/14, d/c 1/19. S/p TTE 1/19, LVEF visually 20-25% with global hypokinesis worse in the apex, LVOT VTI 13 cm, normal RV size/function. Pt transitioned to milrinone as cardiac output improved.    Pt visited. Seen alert in room. During RD prior visit had been not been eating well d/t ABD discomfort and reported diarrhea s/p eating.  Pt reports diarrhea has since stopped and eating better, consuming ~50% of breakfast this AM which was comprised of various items. Not taking Ensure pudding as she does not like. Would like to try ensrue enlive again as diarrhea has stopped.   Labs: sodium 132, Glucose 101, BUN Cr WDL, ALK PHOS 162, ALT SGPT 75, Mg/Phos WDL (Phos elevated prior).  Please see RD Recs below. Pending order placed.     Prior PES:  Inadequate Oral Intake Etiology RT decreased appetite/desire for meals 2/2 GI distress Signs/Symptoms AEB poor PO.   >> ongoing @ this time   Goal/Expected Outcome Pt will meet at least 75% of nutrient needs.    Recommendations:  1. DASH TLC, ensure enlive x1/day 350kcal/20gm prot.  2. Monitor %PO intake, Diet tolerance.   3. Monitor Daily wts, Skin, Pain, GI, GOC.  4. Labs: monitor BMP, CBC, glucose, lytes, trend renal indices, LFTs.  5. RD to remain available for additional nutrition interventions as needed.     Education: Pt able to provide teach-back points from prior RD visit. Reviewed Tips for GI today. Understanding stated, provide additional motivation as needed.     Risk Level: High [   ] Moderate [  X ] Low [   ]
Left wrist tender and red around old PIV site. Warm compress to wrist throughout the day. this afternoon, patient felt swelling had worsened.     Left wrist with intact ROM, slight tender to touch, sensation and motor intact. Pulse strong and perfusion brisk. Denies numbness/tingling.     Plan:   continue warm compress  Tylenol for pain  US wrist ordered to further evaluate.     EILEEN Penaloza-BC

## 2022-01-23 NOTE — PROGRESS NOTE ADULT - PROBLEM SELECTOR PLAN 2
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 p/w pleuritic chest pain with associated myalgias, chills, cough, fever, and generalized fatigue with elevation in troponins and ESR/CRP  -c/w colchicine 0.6mg BID  -f/u final read cardiac MRI

## 2022-01-23 NOTE — PROGRESS NOTE ADULT - SUBJECTIVE AND OBJECTIVE BOX
CARDIOLOGY NP PROGRESS NOTE    Subjective:   Remainder ROS otherwise negative.    Overnight Events:     TELEMETRY:    EKG:      VITAL SIGNS:  T(C): 36.1 (01-23-22 @ 04:46), Max: 37.3 (01-22-22 @ 21:59)  HR: 108 (01-23-22 @ 05:33) (108 - 115)  BP: 86/57 (01-23-22 @ 05:33) (86/53 - 116/59)  RR: 18 (01-23-22 @ 05:33) (18 - 18)  SpO2: 95% (01-23-22 @ 05:33) (95% - 99%)  Wt(kg): --    I&O's Summary    22 Jan 2022 07:01  -  23 Jan 2022 07:00  --------------------------------------------------------  IN: 240 mL / OUT: 1400 mL / NET: -1160 mL          PHYSICAL EXAM:  Awake, talaktive, in NAD  AXOX3, follows commands, appropriate  Neck supple, no JVD noted  PEERL, nasal/buccal mucosa moist and well perfused  BS clear bilaterally, unlabored, symmetrical  S1/S2, no S3, RRR, no M/G/R noted  Abdomen soft, non tender, non distended  No edema noted, perfusion brisk  Pulses palpable throughout  Skin warm and dry, no rashes/lesions noted  R/L wrist with pressure dressing secure, no hematoma/bleeding noted. Perfusion and sensation intact.        LABS:                          10.5   8.53  )-----------( 312      ( 22 Jan 2022 07:39 )             34.8                              01-22    134<L>  |  101  |  11  ----------------------------<  93  4.0   |  21<L>  |  0.70    Ca    9.1      22 Jan 2022 07:39  Phos  3.1     01-22  Mg     2.6     01-22    TPro  6.8  /  Alb  3.5  /  TBili  0.6  /  DBili  x   /  AST  27  /  ALT  60<H>  /  AlkPhos  134<H>  01-22    LIVER FUNCTIONS - ( 22 Jan 2022 07:39 )  Alb: 3.5 g/dL / Pro: 6.8 g/dL / ALK PHOS: 134 U/L / ALT: 60 U/L / AST: 27 U/L / GGT: x                                   CAPILLARY BLOOD GLUCOSE                Allergies:  No Known Allergies    MEDICATIONS  (STANDING):  BACItracin   Ointment 1 Application(s) Topical once  colchicine 0.6 milliGRAM(s) Oral every 12 hours  enoxaparin Injectable 40 milliGRAM(s) SubCutaneous every 12 hours  metoprolol succinate ER 12.5 milliGRAM(s) Oral every 24 hours  pantoprazole    Tablet 40 milliGRAM(s) Oral before breakfast  sacubitril 24 mG/valsartan 26 mG 1 Tablet(s) Oral every 12 hours  spironolactone 25 milliGRAM(s) Oral every 24 hours    MEDICATIONS  (PRN):  acetaminophen     Tablet .. 650 milliGRAM(s) Oral every 6 hours PRN Mild Pain (1 - 3)  aluminum hydroxide/magnesium hydroxide/simethicone Suspension 30 milliLiter(s) Oral every 4 hours PRN Dyspepsia        DIAGNOSTIC TESTS:        CARDIOLOGY NP PROGRESS NOTE    Subjective: Patient seen and examined by me. Reports feeling better. Denies CP/SOB/dizziness/syncope/lightheadedness/nausea. Reports mild pain around left wrist phlebitis. Remainder ROS otherwise negative.    Overnight Events: No acute events overnight. Stepped down from CCU. Cardiac MRI reported as unremarkable. Continues GDMT. Total net NEG ~20L. ~1L NEG/24hrs.     TELEMETRY: Sinus tach 110's    VITAL SIGNS:  T(C): 36.1 (01-23-22 @ 04:46), Max: 37.3 (01-22-22 @ 21:59)  HR: 108 (01-23-22 @ 05:33) (108 - 115)  BP: 86/57 (01-23-22 @ 05:33) (86/53 - 116/59)  RR: 18 (01-23-22 @ 05:33) (18 - 18)  SpO2: 95% (01-23-22 @ 05:33) (95% - 99%)  Wt(kg): 83.2<<83.5<<87.8kg    I&O's Summary    22 Jan 2022 07:01  -  23 Jan 2022 07:00  --------------------------------------------------------  IN: 240 mL / OUT: 1400 mL / NET: -1160 mL    PHYSICAL EXAM:  Awake, talkative, sitting in chair, in NAD  AXOX3, follows commands, appropriate  Neck supple, no JVD noted  PEERL, nasal/buccal mucosa moist and well perfused  BS clear bilaterally, unlabored, symmetrical  S1/S2, no S3, RRR, no M/G/R noted  Abdomen soft, non tender, non distended  No edema noted, perfusion brisk  Pulses palpable throughout  Skin warm and dry throughout, no rashes/lesions noted  L wrist phlebitis. Perfusion and sensation intact.      LABS:                  10.5   8.53  )-----------( 312      ( 22 Jan 2022 07:39 )             34.8                              01-22    134<L>  |  101  |  11  ----------------------------<  93  4.0   |  21<L>  |  0.70    Ca    9.1      22 Jan 2022 07:39  Phos  3.1     01-22  Mg     2.6     01-22    TPro  6.8  /  Alb  3.5  /  TBili  0.6  /  DBili  x   /  AST  27  /  ALT  60<H>  /  AlkPhos  134<H>  01-22    LIVER FUNCTIONS - ( 22 Jan 2022 07:39 )  Alb: 3.5 g/dL / Pro: 6.8 g/dL / ALK PHOS: 134 U/L / ALT: 60 U/L / AST: 27 U/L / GGT: x                                 Allergies:  No Known Allergies    MEDICATIONS  (STANDING):  BACItracin   Ointment 1 Application(s) Topical once  colchicine 0.6 milliGRAM(s) Oral every 12 hours  enoxaparin Injectable 40 milliGRAM(s) SubCutaneous every 12 hours  metoprolol succinate ER 12.5 milliGRAM(s) Oral every 24 hours  pantoprazole    Tablet 40 milliGRAM(s) Oral before breakfast  sacubitril 24 mG/valsartan 26 mG 1 Tablet(s) Oral every 12 hours  spironolactone 25 milliGRAM(s) Oral every 24 hours    MEDICATIONS  (PRN):  acetaminophen     Tablet .. 650 milliGRAM(s) Oral every 6 hours PRN Mild Pain (1 - 3)  aluminum hydroxide/magnesium hydroxide/simethicone Suspension 30 milliLiter(s) Oral every 4 hours PRN Dyspepsia      DIAGNOSTIC TESTS:   Cardiac MRI: unremarkable  TTE 1/23/22: Ef 15-20%, RWMA, normal RV fx, mild MR/TR/.      CARDIOLOGY NP PROGRESS NOTE    Subjective: Patient seen and examined by me. Reports feeling better. Denies CP/SOB/dizziness/syncope/lightheadedness/nausea. Reports mild pain around left wrist phlebitis. Remainder ROS otherwise negative.    Overnight Events: No acute events overnight. Stepped down from CCU. Cardiac MRI reported as unremarkable. Continues GDMT. Total net NEG ~20L. ~1L NEG/24hrs.     TELEMETRY: Sinus tach 110's    VITAL SIGNS:  T(C): 36.1 (01-23-22 @ 04:46), Max: 37.3 (01-22-22 @ 21:59)  HR: 108 (01-23-22 @ 05:33) (108 - 115)  BP: 86/57 (01-23-22 @ 05:33) (86/53 - 116/59)  RR: 18 (01-23-22 @ 05:33) (18 - 18)  SpO2: 95% (01-23-22 @ 05:33) (95% - 99%)  Wt(kg): 83.2<<83.5<<87.8kg    I&O's Summary    22 Jan 2022 07:01  -  23 Jan 2022 07:00  --------------------------------------------------------  IN: 240 mL / OUT: 1400 mL / NET: -1160 mL    PHYSICAL EXAM:  Awake, talkative, sitting in chair, in NAD  AXOX3, follows commands, appropriate  Neck supple, no JVD noted  PEERL, nasal/buccal mucosa moist and well perfused  BS clear bilaterally, unlabored, symmetrical  S1/S2, no S3, RRR, no M/G/R noted  Abdomen soft, non tender, non distended  No edema noted, perfusion brisk  Pulses palpable throughout  Skin warm and dry throughout, no rashes/lesions noted  L wrist phlebitis. Perfusion and sensation intact.      LABS:                  10.5   8.53  )-----------( 312      ( 22 Jan 2022 07:39 )             34.8                              01-22    134<L>  |  101  |  11  ----------------------------<  93  4.0   |  21<L>  |  0.70    Ca    9.1      22 Jan 2022 07:39  Phos  3.1     01-22  Mg     2.6     01-22    TPro  6.8  /  Alb  3.5  /  TBili  0.6  /  DBili  x   /  AST  27  /  ALT  60<H>  /  AlkPhos  134<H>  01-22    LIVER FUNCTIONS - ( 22 Jan 2022 07:39 )  Alb: 3.5 g/dL / Pro: 6.8 g/dL / ALK PHOS: 134 U/L / ALT: 60 U/L / AST: 27 U/L / GGT: x                                 Allergies:  No Known Allergies    MEDICATIONS  (STANDING):  BACItracin   Ointment 1 Application(s) Topical once  colchicine 0.6 milliGRAM(s) Oral every 12 hours  enoxaparin Injectable 40 milliGRAM(s) SubCutaneous every 12 hours  metoprolol succinate ER 12.5 milliGRAM(s) Oral every 24 hours  pantoprazole    Tablet 40 milliGRAM(s) Oral before breakfast  sacubitril 24 mG/valsartan 26 mG 1 Tablet(s) Oral every 12 hours  spironolactone 25 milliGRAM(s) Oral every 24 hours    MEDICATIONS  (PRN):  acetaminophen     Tablet .. 650 milliGRAM(s) Oral every 6 hours PRN Mild Pain (1 - 3)  aluminum hydroxide/magnesium hydroxide/simethicone Suspension 30 milliLiter(s) Oral every 4 hours PRN Dyspepsia      DIAGNOSTIC TESTS:   Cardiac MRI: unremarkable  TTE 1/23/22: EF 15-20%, RWMA, normal RV fx, mild MR/TR.

## 2022-01-23 NOTE — PROGRESS NOTE ADULT - PROBLEM SELECTOR PLAN 1
RESOLVED. likely viral etiology (COVID infection 2 weeks prior, and + coxsackie abs)  -f/u final read cardiac MRI  -Advanced HF following, recs appreciated   -holding diuresis as patient euvolemic   -continue Entresto 24/26mg BID (per pharmacy needs prior-authorization)  -continue Toprol XL 12.5mg QD 1/21  -c/w spironolactone 25mg daily    -Needs 1 week follow up with cardiologist for CHF readmission risk reduction

## 2022-01-23 NOTE — PROGRESS NOTE ADULT - PROBLEM SELECTOR PLAN 3
-tachycardic throughout admission, currently sinus tach 110s  -CTA chest negative for PE  -started Toprol XL 12.5mg daily 1/21 STABLE. Likely HF induced.   -CTA chest negative for PE  -continue Toprol XL 12.5mg daily and uptitrate as tolerated.

## 2022-01-23 NOTE — PROGRESS NOTE ADULT - PROBLEM SELECTOR PLAN 5
-initially hypervolemic hyponatremia which improved with diuretics and now stable in 130-132 range   -continue to trend RESOLVING.   -continue to trend RESOLVED.   -continue to trend

## 2022-01-24 ENCOUNTER — TRANSCRIPTION ENCOUNTER (OUTPATIENT)
Age: 37
End: 2022-01-24

## 2022-01-24 LAB
ALBUMIN SERPL ELPH-MCNC: 3.5 G/DL — SIGNIFICANT CHANGE UP (ref 3.3–5)
ALP SERPL-CCNC: 139 U/L — HIGH (ref 40–120)
ALT FLD-CCNC: 75 U/L — HIGH (ref 10–45)
ANION GAP SERPL CALC-SCNC: 12 MMOL/L — SIGNIFICANT CHANGE UP (ref 5–17)
APTT BLD: 32.4 SEC — SIGNIFICANT CHANGE UP (ref 27.5–35.5)
AST SERPL-CCNC: 43 U/L — HIGH (ref 10–40)
BILIRUB SERPL-MCNC: 0.5 MG/DL — SIGNIFICANT CHANGE UP (ref 0.2–1.2)
BUN SERPL-MCNC: 10 MG/DL — SIGNIFICANT CHANGE UP (ref 7–23)
CALCIUM SERPL-MCNC: 9.4 MG/DL — SIGNIFICANT CHANGE UP (ref 8.4–10.5)
CHLORIDE SERPL-SCNC: 105 MMOL/L — SIGNIFICANT CHANGE UP (ref 96–108)
CO2 SERPL-SCNC: 21 MMOL/L — LOW (ref 22–31)
CREAT SERPL-MCNC: 0.71 MG/DL — SIGNIFICANT CHANGE UP (ref 0.5–1.3)
GLUCOSE SERPL-MCNC: 98 MG/DL — SIGNIFICANT CHANGE UP (ref 70–99)
HCT VFR BLD CALC: 33.6 % — LOW (ref 34.5–45)
HGB BLD-MCNC: 10.4 G/DL — LOW (ref 11.5–15.5)
INR BLD: 1.26 — HIGH (ref 0.88–1.16)
MAGNESIUM SERPL-MCNC: 2.3 MG/DL — SIGNIFICANT CHANGE UP (ref 1.6–2.6)
MCHC RBC-ENTMCNC: 29.7 PG — SIGNIFICANT CHANGE UP (ref 27–34)
MCHC RBC-ENTMCNC: 31 GM/DL — LOW (ref 32–36)
MCV RBC AUTO: 96 FL — SIGNIFICANT CHANGE UP (ref 80–100)
NRBC # BLD: 0 /100 WBCS — SIGNIFICANT CHANGE UP (ref 0–0)
PLATELET # BLD AUTO: 340 K/UL — SIGNIFICANT CHANGE UP (ref 150–400)
POTASSIUM SERPL-MCNC: 4.6 MMOL/L — SIGNIFICANT CHANGE UP (ref 3.5–5.3)
POTASSIUM SERPL-SCNC: 4.6 MMOL/L — SIGNIFICANT CHANGE UP (ref 3.5–5.3)
PROT SERPL-MCNC: 7 G/DL — SIGNIFICANT CHANGE UP (ref 6–8.3)
PROTHROM AB SERPL-ACNC: 15 SEC — HIGH (ref 10.6–13.6)
RBC # BLD: 3.5 M/UL — LOW (ref 3.8–5.2)
RBC # FLD: 17.7 % — HIGH (ref 10.3–14.5)
SODIUM SERPL-SCNC: 138 MMOL/L — SIGNIFICANT CHANGE UP (ref 135–145)
WBC # BLD: 5.81 K/UL — SIGNIFICANT CHANGE UP (ref 3.8–10.5)
WBC # FLD AUTO: 5.81 K/UL — SIGNIFICANT CHANGE UP (ref 3.8–10.5)

## 2022-01-24 PROCEDURE — 99232 SBSQ HOSP IP/OBS MODERATE 35: CPT

## 2022-01-24 PROCEDURE — 93308 TTE F-UP OR LMTD: CPT | Mod: 26

## 2022-01-24 RX ADMIN — Medication 0.6 MILLIGRAM(S): at 05:42

## 2022-01-24 RX ADMIN — Medication 12.5 MILLIGRAM(S): at 10:11

## 2022-01-24 RX ADMIN — Medication 650 MILLIGRAM(S): at 08:32

## 2022-01-24 RX ADMIN — ENOXAPARIN SODIUM 40 MILLIGRAM(S): 100 INJECTION SUBCUTANEOUS at 18:08

## 2022-01-24 RX ADMIN — SACUBITRIL AND VALSARTAN 1 TABLET(S): 24; 26 TABLET, FILM COATED ORAL at 20:35

## 2022-01-24 RX ADMIN — Medication 650 MILLIGRAM(S): at 21:41

## 2022-01-24 RX ADMIN — ENOXAPARIN SODIUM 40 MILLIGRAM(S): 100 INJECTION SUBCUTANEOUS at 05:43

## 2022-01-24 RX ADMIN — SPIRONOLACTONE 25 MILLIGRAM(S): 25 TABLET, FILM COATED ORAL at 07:51

## 2022-01-24 RX ADMIN — Medication 0.6 MILLIGRAM(S): at 18:08

## 2022-01-24 RX ADMIN — Medication 650 MILLIGRAM(S): at 20:34

## 2022-01-24 RX ADMIN — PANTOPRAZOLE SODIUM 40 MILLIGRAM(S): 20 TABLET, DELAYED RELEASE ORAL at 05:43

## 2022-01-24 RX ADMIN — Medication 650 MILLIGRAM(S): at 09:32

## 2022-01-24 RX ADMIN — SACUBITRIL AND VALSARTAN 1 TABLET(S): 24; 26 TABLET, FILM COATED ORAL at 08:31

## 2022-01-24 NOTE — PROGRESS NOTE ADULT - PROBLEM SELECTOR PLAN 5
RESOLVED.   -continue to trend RESOLVING. -likely 2/2 congestive hepatopathy vs ischemia i/s/o cardiogenic shock  - Hepatitis panel negative   - Abdominal US: 2 hypoechoic liver lesions c/w hemangiomas; f/u outpatient    - continue to monitor     F: No IVF  N: DASH/TLC/DM diet  E: Replete lytes PRN K<4, Mg<2  P: DVT PPX: on Lovenox  C: FULL CODE  Dispo: 5 [facility]

## 2022-01-24 NOTE — PROGRESS NOTE ADULT - PROBLEM SELECTOR PROBLEM 2
Acute systolic congestive heart failure
Acute systolic congestive heart failure
Myocarditis
Acute systolic congestive heart failure
Acute systolic congestive heart failure
Myocarditis
Myocarditis

## 2022-01-24 NOTE — PROGRESS NOTE ADULT - PROBLEM SELECTOR PLAN 1
- c/w Entresto 24-26 bid  - c/w Spironolactone 25mg PO QD  - c/w Toprol-XL 12.5mg PO QD  - will f/u cardiac MR read

## 2022-01-24 NOTE — PROGRESS NOTE ADULT - PROBLEM SELECTOR PLAN 1
RESOLVED. likely viral etiology (COVID infection 2 weeks prior, and + coxsackie abs)  -f/u final read cardiac MRI  -Advanced HF following, recs appreciated   -holding diuresis as patient euvolemic   -continue Entresto 24/26mg BID (per pharmacy needs prior-authorization)  -continue Toprol XL 12.5mg QD 1/21  -c/w spironolactone 25mg daily    -Needs 1 week follow up with cardiologist for CHF readmission risk reduction Acute systolic HF, likely viral etiology (COVID infection 2 weeks prior, and + coxsackie abs)  - ECHO 1/19: Severly reduced LVSF, EF 15-20%. Apical septal segment, apical lateral segment, and apex are akinetic. Wall motion of basal inferoseptum is relatively preserved. Remaining segments are hypokinetic. Mild MR/TR. Trivial pericardial effusion w/out tamponade. REPEAT ECHO 1/24: EF 20%, no sig. changes since prev. ECHO   - Cardiac MRI: Unremarkable heart.  - Heart failure following, appreciate recs.   - c/w Entresto 24/26mg BID, Toprol XL 12.5mg qd and Spironolactone 25mg qd   - core measures. Strict I/Os, daily weights. Acute systolic HF, likely viral etiology (COVID infection 2 weeks prior, and + coxsackie abs)  - ECHO 1/19: Severly reduced LVSF, EF 15-20%. Apical septal segment, apical lateral segment, and apex are akinetic. Wall motion of basal inferoseptum is relatively preserved. Remaining segments are hypokinetic. Mild MR/TR. Trivial pericardial effusion w/out tamponade. REPEAT ECHO 1/24: EF 20%, no sig. changes since prev. ECHO   - Cardiac MRI: Akinesis of the base anterolateral, mid anterior and anterolateral, and apical segments. Findings also suggestive of myocarditis   - Heart failure following, appreciate recs.   - c/w Entresto 24/26mg BID, Toprol XL 12.5mg qd and Spironolactone 25mg qd   - core measures. Strict I/Os, daily weights.

## 2022-01-24 NOTE — PROGRESS NOTE ADULT - ASSESSMENT
36F w/hx of anemia transferred from [facility] on 1/13/22 with myopericarditis complicated by cardiogenic shock.

## 2022-01-24 NOTE — PROGRESS NOTE ADULT - SUBJECTIVE AND OBJECTIVE BOX
Outpatient Medications Marked as Taking for the 12/23/19 encounter (Refill) with Abdulkadir Funk, DO   Medication Sig Dispense Refill   • HYDROcodone-acetaminophen (NORCO) 5-325 MG per tablet Take 1 tablet by mouth every 4 to 6 hours as needed for pain. 60 tablet 0        Ok to leave detailed Message: Yes  Informed caller of refill policy- 24-48 hours: Yes  No call back needed unless nurse has questions.     Pharmacy: [facility]   CARDIOLOGY NP PROGRESS NOTE    Subjective:  Received pt awake in chair in NAD. States breathing well but c/o HA. Denies CP, dizziness/diaphoresis, n/v, palpitations.  Remainder ROS otherwise negative.    Overnight Events:  No acute events overnight     TELEMETRY: SB- SR w/occasional PVCs (HR 50s-80s)        VITAL SIGNS:  T(C): 36.7 (01-24-22 @ 10:12), Max: 36.7 (01-24-22 @ 10:12)  HR: 112 (01-24-22 @ 10:11) (99 - 114)  BP: 97/60 (01-24-22 @ 10:11) (89/53 - 105/65)  RR: 16 (01-24-22 @ 08:30) (16 - 18)  SpO2: 96% (01-24-22 @ 08:30) (96% - 97%)  Wt(kg): --    I&O's Summary    23 Jan 2022 07:01  -  24 Jan 2022 07:00  --------------------------------------------------------  IN: 780 mL / OUT: 1200 mL / NET: -420 mL    24 Jan 2022 07:01  -  24 Jan 2022 12:32  --------------------------------------------------------  IN: 180 mL / OUT: 0 mL / NET: 180 mL          PHYSICAL EXAM:    General: A/ox 3, No acute Distress  Neck: Supple, NO JVD  Cardiac: S1 S2, No M/R/G  Pulmonary: CTAB, Breathing unlabored, No Rhonchi/Rales/Wheezing  Abdomen: Soft, Non -tender, +BS x 4 quads  Extremities: No Rashes, No edema  Neuro: A/o x 3, No focal deficits          LABS:                          10.4   5.81  )-----------( 340      ( 24 Jan 2022 06:38 )             33.6                              01-24    138  |  105  |  10  ----------------------------<  98  4.6   |  21<L>  |  0.71    Ca    9.4      24 Jan 2022 06:38  Phos  3.7     01-23  Mg     2.3     01-24    TPro  7.0  /  Alb  3.5  /  TBili  0.5  /  DBili  x   /  AST  43<H>  /  ALT  75<H>  /  AlkPhos  139<H>  01-24    LIVER FUNCTIONS - ( 24 Jan 2022 06:38 )  Alb: 3.5 g/dL / Pro: 7.0 g/dL / ALK PHOS: 139 U/L / ALT: 75 U/L / AST: 43 U/L / GGT: x                                 PT/INR - ( 24 Jan 2022 06:38 )   PT: 15.0 sec;   INR: 1.26          PTT - ( 24 Jan 2022 06:38 )  PTT:32.4 sec  CAPILLARY BLOOD GLUCOSE                Allergies:  No Known Allergies    MEDICATIONS  (STANDING):  colchicine 0.6 milliGRAM(s) Oral every 12 hours  enoxaparin Injectable 40 milliGRAM(s) SubCutaneous every 12 hours  metoprolol succinate ER 12.5 milliGRAM(s) Oral every 24 hours  pantoprazole    Tablet 40 milliGRAM(s) Oral before breakfast  sacubitril 24 mG/valsartan 26 mG 1 Tablet(s) Oral every 12 hours  spironolactone 25 milliGRAM(s) Oral every 24 hours    MEDICATIONS  (PRN):  acetaminophen     Tablet .. 650 milliGRAM(s) Oral every 6 hours PRN Mild Pain (1 - 3)  aluminum hydroxide/magnesium hydroxide/simethicone Suspension 30 milliLiter(s) Oral every 4 hours PRN Dyspepsia        DIAGNOSTIC TESTS:

## 2022-01-24 NOTE — PROGRESS NOTE ADULT - PROBLEM SELECTOR PLAN 6
RESOLVING. -likely 2/2 congestive hepatopathy vs ischemia i/s/o cardiogenic shock  -hepatitis panel negative  -abd US with 2 hypoechoic liver lesions c/w hemangiomas; f/u outpatient    -continue to monitor    #DVT PPx- SQ lovenox  #Dispo- pending medication optimization, cardiac MRI    Case d/w Dr. Beck

## 2022-01-24 NOTE — DISCHARGE NOTE PROVIDER - HOSPITAL COURSE
*INCOMPLETE*    36 y/oF, no PMHx presented from [facility] 1/13/22 to CCU for further mgmt perimyocarditis. Of note, she initially presented to  w/ back pain for which she was treated for PNA and discharge home. Subsequently her sx did not imrpove and she went back w/ epigastric pain, myalgias, chills, fever and hypotension. While n , found to be febrile tachycardic HR 140s w/diffuse ST elevations on EKG a/w tropinemia (51059-->9524).  Echo (1/10/22)s/f  EF 30-35% with pericardial effusion for which she was admitted for perimyocarditis and transferred to [facility] CCU for further mgmt. While in CCU, she was found to be in cardiogenic shock (lactate 2.4->2.2->2.1->1.4. Pro-BNP 36064. Elevated LFTs. Patient remaining hypotensive with likely compensatory tachycardia) for which she was treated with pressors, received aggressive diuresis with IV Lasix and is now s/p balloon pump. Of note she             1/16 s/p left and right heart cath 1/14 showing clear coronaries on C however RHC with elevated left and right filling pressures. balloon pump placed  -c/w balloon pump   -c/w lasix drip for diuresis; monitor BPs   -c/w spironolactone 25mg daily   -holding beta blocker given hypotension with reflex tachy  -strict I/Os  -trend cardiac enzymes daily   -c/w HFNC 50/40 and bed tilting since desaturation imlady    underwent right heart cath         Echo at [facility] (1/10/22) revealed EF 30-35% with pericardial effusion  Lactate 2.4->2.2->2.1->1.4. Pro-BNP 84370. Elevated LFTs. Patient remaining hypotensive with likely compensatory tachycardia  Likely 2/2 perimyocarditis  -TTE 1/13 with severely reduced LV systolic function with EF 15% and RWMA; normal RV function, no valve disease   -CVP 15 PA2 26/40 CO 4.5 CI 2.2 SV 38  -c/w Nipride gtt for afterload reduction (Goal MAP >65) --no inotropes as pt tachycardic   -HF consulted, recs appreciated   -HFNC 50/50 and bed tilting since desaturation lying flat  -right IJ with indwelling [place] cath for therapeutic monitoring  -left and right heart cath today to assess pressures and ischemic eval  -holding beta blocker given hypotension with reflex tachy  -increasing diuresis with furosemide 40mg IV bolus and increasing furosemide drip up to 7.5; caution as pt hypotensive   -strict I/Os  -trend cardiac enzymes daily     At [facility] she received a right heart cath was placed on a balloon pump, milrinone, nipride drip, as well as agressive diuresis with lasix drip. Patient's CI and CO improved oveer several days and balloon pump, milrinone, nipride, and lasix were weaned off.  She was slowly started on spironolactone, toprol, and entresto. Workup for cardiomyopathy found positive covid and coxsackie antibodies. Repeat TTE on 1/19 showed persistently low EF of 15-20% with several akinetic regions. Patient is currently stable on medical therapy and stable for stepdown to telemetry, pending further medical optimization and cardiac MRI.         At [facility] she received a right heart cath was placed on a balloon pump, milrinone, nipride drip, as well as agressive diuresis with lasix drip. Patient's CI and CO improved oveer several days and balloon pump, milrinone, nipride, and lasix were weaned off.  She was slowly started on spironolactone, toprol, and entresto. Workup for cardiomyopathy found positive covid and coxsackie antibodies. Repeat TTE on 1/19 showed persistently low EF of 15-20% with several akinetic regions. Patient is currently stable on medical therapy and stable for stepdown to telemetry, pending further medical optimization and cardiac MRI.        Echo (1/10/22) revealed EF 30-35% with pericardial effusion. Admitted for perimyocarditis and then transferred to [facility] for further work up. Upon arrival, pt reports left sided non-radiating chest pain (scaled 3/10) worsened with coughing. Also endorses weakness, fatigue, epigastric pain, nausea, leg swelling (R>>L, which started yesterday). Denies orthopnea, PND. Reports heat intolerance, and tachycardia which started 2 weeks ago, and irregular menstrual periods. Currently menstruating with heavy flow soaking 5 pads per day, and has gotten her period twice this month. Denies hematemesis, melena, hematochezia and hematuria. ROS otherwise negative. Of note pt denies any personal family hx of any autoimmune, thyroid or liver disorders. Pt also denies any sick contacts or recent travel. Pt is unvaccinated for COVID. *INCOMPLETE*    36 y/oF, no PMHx presented from [place] 1/13/22 to CCU for further mgmt perimyocarditis. Of note, she initially presented to  w/ back pain for which she was treated for PNA and discharge home. Subsequently her sx did not improve and returned worsening CP, epigastric pain, myalgias, chills, fever and hypotension. While n , found to be febrile tachycardic HR 140s w/diffuse ST elevations on EKG a/w tropinemia (37855-->9524).  Echo (1/10/22)s/f  EF 30-35% with pericardial effusion for which she was admitted for perimyocarditis and transferred to [facility] CCU for further mgmt. While in CCU, she was found to be in cardiogenic shock (lactate 2.4->2.2->2.1->1.4. Pro-BNP 05442. Elevated LFTs. Patient remaining hypotensive with likely compensatory tachycardia) for which she was treated with pressors, received aggressive diuresis with IV Lasix and is now s/p balloon pump. Of note she                 he had signs of end organ perfusion (lactate 2.4, transaminitis) and underwent R/LHC 1/14 which revealed severely elevated filing pressures and low cardiac output prompting placement of IABP. Her hemodynamics improved and IABP was successfully removed on 1/18. She has been weaned from inotropes and tolerating low doses of GDMT. She remains with sinus tachycardia and borderline blood pressure but is asymptomatic and TTE appears to show some mild quantitative improvement.     Overall, presentation is consistent with cardiogenic shock in setting of likely myocarditis (possibly COVID-19 related) now clinically improved. Will tentatively plan for discharge tomorrow.     HEMODYNAMICS  1/18 (IABP 1:1, nipride 1.5): RA 9, PA 40/20 (27), PCWP 20, PA sat 68% with [place] CO/CI 7.6/3.7. BP 89/60 with IABP on standby and on nipride.   1/19 (milrinone 0.25, nipride 1.5): RA 8, PA 30/12, PCWP 11, PA sat 71% with [place] CO/CI 7.4/3.6  1/20 (milrinone 0.25): [place] CO/CI 7.3/3.5    REVIEW OF STUDIES  TTE 1/14: LV 4.3 cm, LVEF 15% with relatively preserved basal motion, LVOT VTI 11 cm, grossly normal RV function, no significant valvular abnormalities:   TTE 1/19: LV 4.3 cm, LVEF visually 20-25% with global hypokinesis worse in the apex, LVOT VTI 13 cm, normal RV size/function  TTE 1/24: LVEF read as 20% but visually appears 30-35% and is clearly improved from presentation TTE     RHC 1/14/22: RA 17, PA 45/33 (38), PCWP 35, PA sat 27% with [place] CO/CI 2.        1/16 s/p left and right heart cath 1/14 showing clear coronaries on LHC however RHC with elevated left and right filling pressures. balloon pump placed  -c/w balloon pump   -c/w lasix drip for diuresis; monitor BPs   -c/w spironolactone 25mg daily   -holding beta blocker given hypotension with reflex tachy  -strict I/Os  -trend cardiac enzymes daily   -c/w HFNC 50/40 and bed tilting since desaturation lyin    underwent right heart cath         Echo at [place] (1/10/22) revealed EF 30-35% with pericardial effusion  Lactate 2.4->2.2->2.1->1.4. Pro-BNP 86580. Elevated LFTs. Patient remaining hypotensive with likely compensatory tachycardia  Likely 2/2 perimyocarditis  -TTE 1/13 with severely reduced LV systolic function with EF 15% and RWMA; normal RV function, no valve disease   -CVP 15 PA2 26/40 CO 4.5 CI 2.2 SV 38  -c/w Nipride gtt for afterload reduction (Goal MAP >65) --no inotropes as pt tachycardic   -HF consulted, recs appreciated   -HFNC 50/50 and bed tilting since desaturation lying flat  -right IJ with indwelling [place] cath for therapeutic monitoring  -left and right heart cath today to assess pressures and ischemic eval  -holding beta blocker given hypotension with reflex tachy  -increasing diuresis with furosemide 40mg IV bolus and increasing furosemide drip up to 7.5; caution as pt hypotensive   -strict I/Os  -trend cardiac enzymes daily     At [facility] she received a right heart cath was placed on a balloon pump, milrinone, nipride drip, as well as agressive diuresis with lasix drip. Patient's CI and CO improved oveer several days and balloon pump, milrinone, nipride, and lasix were weaned off.  She was slowly started on spironolactone, toprol, and entresto. Workup for cardiomyopathy found positive covid and coxsackie antibodies. Repeat TTE on 1/19 showed persistently low EF of 15-20% with several akinetic regions. Patient is currently stable on medical therapy and stable for stepdown to telemetry, pending further medical optimization and cardiac MRI.         At [facility] she received a right heart cath was placed on a balloon pump, milrinone, nipride drip, as well as agressive diuresis with lasix drip. Patient's CI and CO improved oveer several days and balloon pump, milrinone, nipride, and lasix were weaned off.  She was slowly started on spironolactone, toprol, and entresto. Workup for cardiomyopathy found positive covid and coxsackie antibodies. Repeat TTE on 1/19 showed persistently low EF of 15-20% with several akinetic regions. Patient is currently stable on medical therapy and stable for stepdown to telemetry, pending further medical optimization and cardiac MRI.        Echo (1/10/22) revealed EF 30-35% with pericardial effusion. Admitted for perimyocarditis and then transferred to [facility] for further work up. Upon arrival, pt reports left sided non-radiating chest pain (scaled 3/10) worsened with coughing. Also endorses weakness, fatigue, epigastric pain, nausea, leg swelling (R>>L, which started yesterday). Denies orthopnea, PND. Reports heat intolerance, and tachycardia which started 2 weeks ago, and irregular menstrual periods. Currently menstruating with heavy flow soaking 5 pads per day, and has gotten her period twice this month. Denies hematemesis, melena, hematochezia and hematuria. ROS otherwise negative. Of note pt denies any personal family hx of any autoimmune, thyroid or liver disorders. Pt also denies any sick contacts or recent travel. Pt is unvaccinated for COVID. *INCOMPLETE*    36 y/oF, no PMHx presented from [place] 1/13/22 to CCU for further mgmt perimyocarditis. Of note, she initially presented to  w/ back pain for which she was treated for PNA and discharge home. Subsequently her sx did not improve and returned worsening CP, epigastric pain, myalgias, chills, fever and hypotension for which she was found to be febrile tachycardic HR 140s w/diffuse ST elevations on EKG a/w tropinemia (69637-->9524).  Echo (1/10/22)s/f  EF 30-35% with pericardial effusion for which she was admitted for perimyocarditis and transferred to [facility] CCU for further mgmt.     While in CCU, she was found to be in cardiogenic shock (lactate 2.4->2.2->2.1->1.4. Pro-BNP 28387. Elevated LFTs. Patient remaining hypotensive with likely compensatory tachycardia) for which she was treated with pressors, received aggressive diuresis with IV Lasix and is now s/p balloon pump as her clinical status improved. Of note she underwent R/[facility] 1/14 s/f normal coronaries and elevated right heart pressures and low cardiac output:  RA 17, PA 45/33 (38), PCWP 35, PA sat 27% with [place] CO/CI 2. ECHO performed s/f  persistently low EF of 15-20% with several akinetic regions. She remained hemodynamically stable and was stepped down to [facility] 1/22 for further mgmt.     While on 5U, : Repeat ECHO: EF 20%, no sig. changes since prev. ECHO - Cardiac MRI performed s/f: Akinesis of the base anterolateral, mid anterior and anterolateral, and apical segments. Findings also suggestive of myocarditis *INCOMPLETE*    36 y/oF, no PMHx presented from [place] 1/13/22 to CCU for further mgmt perimyocarditis. Of note, she initially presented to  w/ back pain for which she was treated for PNA and discharge home. Subsequently her sx did not improve and returned worsening CP, epigastric pain, myalgias, chills, fever and hypotension for which she was found to be febrile tachycardic HR 140s w/diffuse ST elevations on EKG a/w tropinemia (92119-->9524).  Echo (1/10/22)s/f  EF 30-35% with pericardial effusion for which she was admitted for perimyocarditis and transferred to [facility] CCU for further mgmt.     While in CCU, she was found to be in cardiogenic shock (lactate 2.4->2.2->2.1->1.4. Pro-BNP 29463. Elevated LFTs. Patient remaining hypotensive with likely compensatory tachycardia) for which she was treated with pressors, received aggressive diuresis with IV Lasix and is now s/p balloon pump as her clinical status improved. Of note she underwent R/[facility] 1/14 s/f normal coronaries and elevated right heart pressures and low cardiac output:  RA 17, PA 45/33 (38), PCWP 35, PA sat 27% with [place] CO/CI 2. ECHO performed s/f  persistently low EF of 15-20% with several akinetic regions. She remained hemodynamically stable and was stepped down to [facility] 1/22 for further mgmt.     While on 5U, : Repeat ECHO: EF 20%, no sig. changes since prev. ECHO - Cardiac MRI performed s/f: Akinesis of the base anterolateral, mid anterior and anterolateral, and apical segments. Findings also suggestive of myocarditis          *INCOMPLETE*    36 y/oF, no PMHx presented from [place] 1/13/22 to CCU for further mgmt perimyocarditis. Of note, she initially presented to  w/ back pain for which she was treated for PNA and discharge home. Subsequently her sx did not improve and returned worsening CP, epigastric pain, myalgias, chills, fever and hypotension for which she was found to be febrile tachycardic HR 140s w/diffuse ST elevations on EKG a/w tropinemia (87626-->9524).  Echo (1/10/22)s/f  EF 30-35% with pericardial effusion for which she was admitted for perimyocarditis and transferred to [facility] CCU for further mgmt.     While in CCU, she was found to be in cardiogenic shock (lactate 2.4->2.2->2.1->1.4. Pro-BNP 61476. Elevated LFTs. Patient remaining hypotensive with likely compensatory tachycardia) for which she was treated with pressors, received aggressive diuresis with IV Lasix and is now s/p balloon pump as her clinical status improved. Of note she underwent R/[facility] 1/14 s/f normal coronaries and elevated right heart pressures and low cardiac output:  RA 17, PA 45/33 (38), PCWP 35, PA sat 27% with [place] CO/CI 2. ECHO performed s/f  persistently low EF of 15-20% with several akinetic regions. She remained hemodynamically stable and was stepped down to [facility] 1/22 for further mgmt.     While on 5U, : Repeat ECHO: EF 20%, no sig. changes since prev. ECHO - Cardiac MRI performed s/f: Akinesis of the base anterolateral, mid anterior and anterolateral, and apical segments. Findings also suggestive of myocarditis      36 y/oF, no PMHx presented from [place] 1/13/22 to CCU for further mgmt perimyocarditis. Of note, she initially presented to  w/ back pain for which she was treated for PNA and discharge home. Subsequently her sx did not improve and returned worsening CP, epigastric pain, myalgias, chills, fever and hypotension for which she was found to be febrile tachycardic HR 140s w/diffuse ST elevations on EKG a/w tropinemia (47812-->9524).  Echo (1/10/22)s/f  EF 30-35% with pericardial effusion for which she was admitted for perimyocarditis and transferred to [facility] CCU for further mgmt.     While in CCU, she was found to be in cardiogenic shock (lactate 2.4->2.2->2.1->1.4. Pro-BNP 93382. Elevated LFTs. Patient remaining hypotensive with likely compensatory tachycardia) for which she was treated with pressors, received aggressive diuresis with IV Lasix and is now s/p balloon pump as her clinical status improved. Of note she underwent R/[facility] 1/14 s/f normal coronaries and elevated right heart pressures and low cardiac output:  RA 17, PA 45/33 (38), PCWP 35, PA sat 27% with [place] CO/CI 2. ECHO performed s/f  persistently low EF of 15-20% with several akinetic regions. She remained hemodynamically stable and was stepped down to [facility] 1/22 for further mgmt.     While on 5U, : Repeat ECHO: EF 20%, no sig. changes since prev. ECHO - Cardiac MRI performed s/f: Akinesis of the base anterolateral, mid anterior and anterolateral, and apical segments. Findings also suggestive of myocarditis. Patient remains hemodynamically stable and is to be discharged home today. On the day of discharge, the patient was seen and examined. Symptoms improved. Vital signs are stable. Labs and imaging reviewed. Patient is medically optimized and hemodynamically stable. Return precautions discussed, medication teach back done w/ patient, and importance of physician followup emphasized. for which she verbalized understanding.     DC Meds:    Colchicine 0.6mg BID- LAST DAY 2/12/2022  Entresto 24/26mg BID  Metoprolol succinate 25mg qd  Spironolactone 25mg

## 2022-01-24 NOTE — PROGRESS NOTE ADULT - PROBLEM SELECTOR PLAN 3
STABLE. Likely HF induced.   -CTA chest negative for PE  -continue Toprol XL 12.5mg daily and uptitrate as tolerated. - tele- ST, HR 100s-130s   - CTA chest negative for PE  - c/w Toprol XL 12.5mg qd and up titrate as tolerated

## 2022-01-24 NOTE — DISCHARGE NOTE PROVIDER - INSTRUCTIONS
Limit salt intake on a daily basis. Salt is hidden in many foods including fast food, processed foods, deli foods and deli meats. Cook at home as much as you can and DO NOT use salt. Limit liquid intake to 1 liter a day (includes water, any drinks and soup).

## 2022-01-24 NOTE — PROGRESS NOTE ADULT - PROBLEM SELECTOR PLAN 4
STABLE. -hgb 8-9s earlier in admission, improving to 10s  -iron sat 14%  -s/p venofer 300mg x3 days  -consider PO iron supplement on discharge On admission, hgb 8s-9s   - hgb 10.4 today. No s/sx bleeding   - Iron studies c/w ANDREI   - s/p venofer 300mg x3 days  - consider PO iron supplement on discharge

## 2022-01-24 NOTE — PROGRESS NOTE ADULT - PROBLEM SELECTOR PLAN 2
RESOLVING. p/w pleuritic chest pain with associated myalgias, chills, cough, fever, and generalized fatigue with elevation in troponins and ESR/CRP  -c/w colchicine 0.6mg BID  -f/u final read cardiac MRI P/W pleuritic CP a/w myalgias, chills, cough, fever & generalized fatigue w/elevated trop and ESR/CRP   - c/w Colchicine 0.6mg BID P/W pleuritic CP a/w myalgias, chills, cough, fever & generalized fatigue w/elevated trop and ESR/CRP   - ECHO and MRI findings as above   - c/w Colchicine 0.6mg BID

## 2022-01-24 NOTE — PROGRESS NOTE ADULT - SUBJECTIVE AND OBJECTIVE BOX
INTERVAL HPI/OVERNIGHT EVENTS: Patient seen and examined at bedside.     Home Medications:  Multiple Vitamins oral tablet: 1 tab(s) orally once a day (13 Jan 2022 23:04)      VITAL SIGNS:  T(F): 98 (01-24-22 @ 10:12)  HR: 112 (01-24-22 @ 10:11)  BP: 97/60 (01-24-22 @ 10:11)  RR: 16 (01-24-22 @ 08:30)  SpO2: 96% (01-24-22 @ 08:30)  Wt(kg): --    01-23-22 @ 07:01  -  01-24-22 @ 07:00  --------------------------------------------------------  IN: 780 mL / OUT: 1200 mL / NET: -420 mL    01-24-22 @ 07:01  -  01-24-22 @ 11:37  --------------------------------------------------------  IN: 180 mL / OUT: 0 mL / NET: 180 mL        PHYSICAL EXAM:    GEN: Awake, comfortable. NAD.   HEENT: NCAT, PERRL, EOMI. Mucosa moist.   NECK: Supple, no JVD.   RESP: CTA b/l  CV: RRR, normal s1/s2. No m/r/g.  ABD: Soft, NTND. BS+  EXT: Warm. No edema, clubbing, or cyanosis.   NEURO: AAOx3. No focal deficits.    MEDICATIONS  (STANDING):  colchicine 0.6 milliGRAM(s) Oral every 12 hours  enoxaparin Injectable 40 milliGRAM(s) SubCutaneous every 12 hours  metoprolol succinate ER 12.5 milliGRAM(s) Oral every 24 hours  pantoprazole    Tablet 40 milliGRAM(s) Oral before breakfast  sacubitril 24 mG/valsartan 26 mG 1 Tablet(s) Oral every 12 hours  spironolactone 25 milliGRAM(s) Oral every 24 hours    MEDICATIONS  (PRN):  acetaminophen     Tablet .. 650 milliGRAM(s) Oral every 6 hours PRN Mild Pain (1 - 3)  aluminum hydroxide/magnesium hydroxide/simethicone Suspension 30 milliLiter(s) Oral every 4 hours PRN Dyspepsia      Allergies    No Known Allergies    Intolerances        LABS:                        10.4   5.81  )-----------( 340      ( 24 Jan 2022 06:38 )             33.6     01-24    138  |  105  |  10  ----------------------------<  98  4.6   |  21<L>  |  0.71    Ca    9.4      24 Jan 2022 06:38  Phos  3.7     01-23  Mg     2.3     01-24    TPro  7.0  /  Alb  3.5  /  TBili  0.5  /  DBili  x   /  AST  43<H>  /  ALT  75<H>  /  AlkPhos  139<H>  01-24    PT/INR - ( 24 Jan 2022 06:38 )   PT: 15.0 sec;   INR: 1.26          PTT - ( 24 Jan 2022 06:38 )  PTT:32.4 sec        ECHO:   INTERVAL HPI/OVERNIGHT EVENTS: Patient seen and examined at bedside. No chest pain, palpitations, or SOB. Has been ambulating without any discomfort. Endorses occasional dry cough with deep inspiration.    Home Medications:  Multiple Vitamins oral tablet: 1 tab(s) orally once a day (13 Jan 2022 23:04)      VITAL SIGNS:  T(F): 98 (01-24-22 @ 10:12)  HR: 112 (01-24-22 @ 10:11)  BP: 97/60 (01-24-22 @ 10:11)  RR: 16 (01-24-22 @ 08:30)  SpO2: 96% (01-24-22 @ 08:30)  Wt(kg): --    01-23-22 @ 07:01  -  01-24-22 @ 07:00  --------------------------------------------------------  IN: 780 mL / OUT: 1200 mL / NET: -420 mL    01-24-22 @ 07:01  -  01-24-22 @ 11:37  --------------------------------------------------------  IN: 180 mL / OUT: 0 mL / NET: 180 mL        PHYSICAL EXAM:    GEN: Awake, comfortable. NAD.   HEENT: NCAT, PERRL, EOMI. Mucosa moist.   NECK: Supple, no JVD.   RESP: CTA b/l  CV: RRR, normal s1/s2. No m/r/g.  ABD: Soft, NTND. BS+  EXT: Warm. No edema, clubbing, or cyanosis.   NEURO: AAOx3. No focal deficits.    MEDICATIONS  (STANDING):  colchicine 0.6 milliGRAM(s) Oral every 12 hours  enoxaparin Injectable 40 milliGRAM(s) SubCutaneous every 12 hours  metoprolol succinate ER 12.5 milliGRAM(s) Oral every 24 hours  pantoprazole    Tablet 40 milliGRAM(s) Oral before breakfast  sacubitril 24 mG/valsartan 26 mG 1 Tablet(s) Oral every 12 hours  spironolactone 25 milliGRAM(s) Oral every 24 hours    MEDICATIONS  (PRN):  acetaminophen     Tablet .. 650 milliGRAM(s) Oral every 6 hours PRN Mild Pain (1 - 3)  aluminum hydroxide/magnesium hydroxide/simethicone Suspension 30 milliLiter(s) Oral every 4 hours PRN Dyspepsia      Allergies    No Known Allergies    Intolerances        LABS:                        10.4   5.81  )-----------( 340      ( 24 Jan 2022 06:38 )             33.6     01-24    138  |  105  |  10  ----------------------------<  98  4.6   |  21<L>  |  0.71    Ca    9.4      24 Jan 2022 06:38  Phos  3.7     01-23  Mg     2.3     01-24    TPro  7.0  /  Alb  3.5  /  TBili  0.5  /  DBili  x   /  AST  43<H>  /  ALT  75<H>  /  AlkPhos  139<H>  01-24    PT/INR - ( 24 Jan 2022 06:38 )   PT: 15.0 sec;   INR: 1.26          PTT - ( 24 Jan 2022 06:38 )  PTT:32.4 sec        ECHO:  < from: TTE Limited Echo w/o Cont (01.24.22 @ 10:28) >  ---------------------------------------------------------------------------  -----  CONCLUSIONS:     1. Limited study obtained for evaluation of left ventricular function.   2. Severe hypokinesis of the apical anterior, apical inferior, apical   septal, and mid to apical lateralextending to the apex. The estimated   left ventricular ejection fraction is 20%.   3. The right ventricle is normal in size. Right ventricular systolic   function is normal.   4. There is trace tricuspid regurgitation. There is no echocardiographic   evidence of pulmonary hypertension, pulmonary artery systolic pressure is   37 mmHg.   5. No pericardial effusion is seen.   6. Compared to the previous TTE performed on 1/19/2022, there have been   no significant interval changes.    ---------------------------------------------------------------------------    < end of copied text >  < from: TTE Echo Complete w/o Contrast w/ Doppler (01.19.22 @ 16:19) >  ---------------------------------------------------------------------------  -----  CONCLUSIONS:     1. Patient was tachycardic during the study.   2. Severely reduced left ventricular systolic function. Ejection   fraction 15-20%.   3. The apical septal segment, apical lateral segment, and apex are   akinetic. Wall motion of basal inferoseptum is relatively preserved.   Remaining segments are hypokinetic.   4. Normal right ventricular size.   5. Probably normal right ventricular systolic function.   6. Mild mitral regurgitation.   7. Mild tricuspid regurgitation.   8. No evidence of pulmonary hypertension.   9. Trivial pericardial effusion without echocardiographic evidence of   cardiac tamponade physiology.  10. Compared to the previous TTE performed on 1/14/2020, there have been   no significant interval changes.    ---------------------------------------------------------------------------    < end of copied text >

## 2022-01-24 NOTE — PROGRESS NOTE ADULT - ATTENDING COMMENTS
37 YO F with a history of morbid obesity and chronic anemia from menorrhagia who presented to [facility] with days-weeks of worsening chest pain and heart failure symptoms and found to have elevated troponin levels with diffuse nonspecific ST elevations associated with severe LV dysfunction. She had signs of end organ perfusion (lactate 2.4, transaminitis) and underwent R/LHC 1/14 which revealed severely elevated filing pressures and low cardiac output prompting placement of IABP. Her hemodynamics improved and IABP was successfully removed on 1/18. She has been weaned from inotropes and tolerating low doses of GDMT. She remains with sinus tachycardia and borderline blood pressure but is asymptomatic and TTE appears to show some mild quantitative improvement.     Overall, presentation is consistent with cardiogenic shock in setting of likely myocarditis (possibly COVID-19 related) now clinically improved. Will tentatively plan for discharge tomorrow.     HEMODYNAMICS  1/18 (IABP 1:1, nipride 1.5): RA 9, PA 40/20 (27), PCWP 20, PA sat 68% with [place] CO/CI 7.6/3.7. BP 89/60 with IABP on standby and on nipride.   1/19 (milrinone 0.25, nipride 1.5): RA 8, PA 30/12, PCWP 11, PA sat 71% with Augustin CO/CI 7.4/3.6  1/20 (milrinone 0.25): Augustin CO/CI 7.3/3.5    REVIEW OF STUDIES  TTE 1/14: LV 4.3 cm, LVEF 15% with relatively preserved basal motion, LVOT VTI 11 cm, grossly normal RV function, no significant valvular abnormalities:   TTE 1/19: LV 4.3 cm, LVEF visually 20-25% with global hypokinesis worse in the apex, LVOT VTI 13 cm, normal RV size/function  TTE 1/24: LVEF read as 20% but visually appears 30-35% and is clearly improved from presentation TTE     RHC 1/14/22: RA 17, PA 45/33 (38), PCWP 35, PA sat 27% with Augustin CO/CI 2.8/1.4  C 1/14/22: normal coronary arteries     COVID PCR: negative. COVID spike and nucleocapsid Ab positive.    PLAN  # Acute systolic heart failure with cardiogenic shock  - GDMT: continue entresto 24-26 mg BID, metoprolol succinate 12.5 mg daily, and spironolactone 25 mg daily. insufficient room to uptitrate at this time   - Diuretics: continue off diuretics  - Device: needs reassessment of LVEF at 3 months (4/2022)    # Myopericarditis  - Reasonable to continue colchicine  - Cardiac MRI report pending, prelim read normal but appears to have significant LGE on my review     # Hyponatremia  - resolved    # Anemia  - improved with IV iron     # Transaminitis  - improving     # Hypoxia  - CTA negative for PE, she is no longer requiring supplemental oxygen since diuresis

## 2022-01-24 NOTE — DISCHARGE NOTE PROVIDER - NSDCCPCAREPLAN_GEN_ALL_CORE_FT
PRINCIPAL DISCHARGE DIAGNOSIS  Diagnosis: Acute systolic congestive heart failure  Assessment and Plan of Treatment: - You were admitted to the hospital for shortness of breath. An echocardiogram or ultrasound of your heart was performed which showed an ejection fraction or pumping function of your heart to be 15%, which is VERY LOW with systolic dysfunction. In other words, you were diagnosed with systolic heart failure. This is a type of heart failure. Heart failure is a condition in which the heart does not pump or fill with blood well. As a result, the heart lags behind in its job of moving blood throughout the body. This can lead to symptoms such as swelling, trouble breathing, and feeling tired. Your heart is VERY WEAK. When it pumps it does not squeeze properly.  You were given intravenous (through your IV) Lasix to get rid of the fluid in your lungs and to help you breathe better.   - You were started on a medication called Entresto. This medication is used to lower the risk of cardiovascular death and hospitalization for heart failure. Entresto relaxes blood vessels, improves blood flow, and reduces stress on the heart  - Please maintain a low salt diet (less than 2grams per day). Weigh yourself daily and report any weight gain over 2 pounds/day to your Doctor.        SECONDARY DISCHARGE DIAGNOSES  Diagnosis: Myocarditis  Assessment and Plan of Treatment:     Diagnosis: Tachycardia  Assessment and Plan of Treatment:      PRINCIPAL DISCHARGE DIAGNOSIS  Diagnosis: Acute systolic congestive heart failure  Assessment and Plan of Treatment: - You were admitted to the hospital for shortness of breath. An echocardiogram or ultrasound of your heart was performed which showed an ejection fraction or pumping function of your heart to be 15%, which is VERY LOW with systolic dysfunction. In other words, you were diagnosed with systolic heart failure. This is a type of heart failure. Heart failure is a condition in which the heart does not pump or fill with blood well. As a result, the heart lags behind in its job of moving blood throughout the body. This can lead to symptoms such as swelling, trouble breathing, and feeling tired. Your heart is VERY WEAK. When it pumps it does not squeeze properly.  You were given intravenous (through your IV) Lasix to get rid of the fluid in your lungs and to help you breathe better.   - You were started on a medication called Entresto. This medication is used to lower the risk of cardiovascular death and hospitalization for heart failure. Entresto relaxes blood vessels, improves blood flow, and reduces stress on the heart  - Please maintain a low salt diet (less than 2grams per day). Weigh yourself daily and report any weight gain over 2 pounds/day to your Doctor.        SECONDARY DISCHARGE DIAGNOSES  Diagnosis: Myocarditis  Assessment and Plan of Treatment: - You were found to have myocarditis which is an inflammation of the heart. It is often caused by a virus or autoimmune disease. It was most likely caused by your recent COVID infection. Symptoms can include heart failure (which you have), chest pain and selling of feet and legs.       Diagnosis: Tachycardia  Assessment and Plan of Treatment:      PRINCIPAL DISCHARGE DIAGNOSIS  Diagnosis: Acute systolic congestive heart failure  Assessment and Plan of Treatment: - You were admitted to the hospital for shortness of breath. An echocardiogram or ultrasound of your heart was performed which showed an ejection fraction or pumping function of your heart to be 15%, which is VERY LOW with systolic dysfunction. In other words, you were diagnosed with systolic heart failure. This is a type of heart failure. Heart failure is a condition in which the heart does not pump or fill with blood well. As a result, the heart lags behind in its job of moving blood throughout the body. This can lead to symptoms such as swelling, trouble breathing, and feeling tired. Your heart is VERY WEAK. When it pumps it does not squeeze properly.  You were given intravenous (through your IV) Lasix to get rid of the fluid in your lungs and to help you breathe better.   - You were started on a medication called Entresto. This medication is used to lower the risk of cardiovascular death and hospitalization for heart failure. Entresto relaxes blood vessels, improves blood flow, and reduces stress on the heart  - You were also started on Metoprolol Succinate 25mg daily and Spironolactone 25mg daily as these medications will also helo with the pump function.  Please continue these medications as prescribed. As discussed it is important to monitor your blood pressure. Please check your blood pressure before taking these medications and if the systolic or top number is LESS than 85, hold the medication.   - Please maintain a low salt diet (less than 2grams per day). Weigh yourself daily and report any weight gain over 2 pounds/day to your Doctor.        SECONDARY DISCHARGE DIAGNOSES  Diagnosis: Myocarditis  Assessment and Plan of Treatment: - You were found to have myocarditis which is an inflammation of the heart. It is often caused by a virus or autoimmune disease. It was most likely caused by your recent COVID infection. Symptoms can include heart failure (which you have), chest pain and selling of feet and legs.   - You were started on Colchcine 0.6mg twice a day to help with the inflammation and will continue this medication till February 12, 2022.

## 2022-01-24 NOTE — DISCHARGE NOTE PROVIDER - PROVIDER TOKENS
PROVIDER:[TOKEN:[[id number]]] PROVIDER:[TOKEN:[[id number]],SCHEDULEDAPPT:[01/31/2022],SCHEDULEDAPPTTIME:[04:00 PM]],PROVIDER:[TOKEN:[[id number]],FOLLOWUP:[Routine]]

## 2022-01-24 NOTE — PROGRESS NOTE ADULT - NUTRITIONAL ASSESSMENT
This patient has been assessed with a concern for Malnutrition and has been determined to have a diagnosis/diagnoses of Morbid obesity (BMI > 40).    This patient is being managed with:   Diet DASH/TLC-  Sodium & Cholesterol Restricted  Supplement Feeding Modality:  Oral  Ensure Pudding Cans or Servings Per Day:  1       Frequency:  Daily  Entered: Jan 17 2022  1:17PM    

## 2022-01-24 NOTE — DISCHARGE NOTE PROVIDER - CARE PROVIDERS DIRECT ADDRESSES
,DirectAddress_Unknown ,[email or web address],DirectAddress_Unknown

## 2022-01-24 NOTE — DISCHARGE NOTE PROVIDER - NSDCMRMEDTOKEN_GEN_ALL_CORE_FT
Entresto 24 mg-26 mg oral tablet: 1 tab(s) orally every 12 hours   Multiple Vitamins oral tablet: 1 tab(s) orally once a day   colchicine 0.6 mg oral tablet: 1 tab(s) orally every 12 hours. LAST DAY 2/12/2022  Entresto 24 mg-26 mg oral tablet: 1 tab(s) orally every 12 hours   Entresto 24 mg-26 mg oral tablet: 1 tab(s) orally every 12 hours  Metoprolol Succinate ER 25 mg oral tablet, extended release: 1 tab(s) orally once a day.   Multiple Vitamins oral tablet: 1 tab(s) orally once a day  spironolactone 25 mg oral tablet: 1 tab(s) orally every 24 hours

## 2022-01-24 NOTE — DISCHARGE NOTE PROVIDER - CARE PROVIDER_API CALL
Alonso Salazar)  Cardiology; Internal Medicine  [address] [place] [address]  Phone: [phone]  Fax: [phone]  Follow Up Time:    Lam Rene)  Internal Medicine  [address] [place] [address]  Phone: [phone]  Fax: [phone]  Scheduled Appointment: 01/31/2022 04:00 PM    Alonso Salazar)  Cardiology; Internal Medicine  [address] [place] [address]  Phone: [phone]  Fax: [phone]  Follow Up Time: Routine

## 2022-01-24 NOTE — DISCHARGE NOTE PROVIDER - DETAILS OF MALNUTRITION DIAGNOSIS/DIAGNOSES
This patient has been assessed with a concern for Malnutrition and was treated during this hospitalization for the following Nutrition diagnosis/diagnoses:     -  01/17/2022: Morbid obesity (BMI > 40)

## 2022-01-24 NOTE — DISCHARGE NOTE PROVIDER - NSDCFUADDINST_GEN_ALL_CORE_FT
- As discussed it is important to check your blood pressure. Please check your blood pressure prior to taking Entresto, Metoprolol and Spironolactone. If the systolic or top number is LESS than 85, hold the medication.   - Please also keep a diary of your blood pressures with the dates and times so you can keep a log for reference when following up with your cardiologist.

## 2022-01-25 ENCOUNTER — TRANSCRIPTION ENCOUNTER (OUTPATIENT)
Age: 37
End: 2022-01-25

## 2022-01-25 VITALS — TEMPERATURE: 99 F

## 2022-01-25 PROBLEM — Z78.9 OTHER SPECIFIED HEALTH STATUS: Chronic | Status: ACTIVE | Noted: 2022-01-13

## 2022-01-25 LAB
ALBUMIN SERPL ELPH-MCNC: 3.7 G/DL — SIGNIFICANT CHANGE UP (ref 3.3–5)
ALP SERPL-CCNC: 159 U/L — HIGH (ref 40–120)
ALT FLD-CCNC: 82 U/L — HIGH (ref 10–45)
ANION GAP SERPL CALC-SCNC: 11 MMOL/L — SIGNIFICANT CHANGE UP (ref 5–17)
AST SERPL-CCNC: 60 U/L — HIGH (ref 10–40)
BILIRUB SERPL-MCNC: 0.4 MG/DL — SIGNIFICANT CHANGE UP (ref 0.2–1.2)
BUN SERPL-MCNC: 14 MG/DL — SIGNIFICANT CHANGE UP (ref 7–23)
CALCIUM SERPL-MCNC: 8.9 MG/DL — SIGNIFICANT CHANGE UP (ref 8.4–10.5)
CHLORIDE SERPL-SCNC: 106 MMOL/L — SIGNIFICANT CHANGE UP (ref 96–108)
CO2 SERPL-SCNC: 21 MMOL/L — LOW (ref 22–31)
CREAT SERPL-MCNC: 0.68 MG/DL — SIGNIFICANT CHANGE UP (ref 0.5–1.3)
GLUCOSE SERPL-MCNC: 104 MG/DL — HIGH (ref 70–99)
HCT VFR BLD CALC: 32.5 % — LOW (ref 34.5–45)
HGB BLD-MCNC: 10.3 G/DL — LOW (ref 11.5–15.5)
MAGNESIUM SERPL-MCNC: 2.2 MG/DL — SIGNIFICANT CHANGE UP (ref 1.6–2.6)
MCHC RBC-ENTMCNC: 30.5 PG — SIGNIFICANT CHANGE UP (ref 27–34)
MCHC RBC-ENTMCNC: 31.7 GM/DL — LOW (ref 32–36)
MCV RBC AUTO: 96.2 FL — SIGNIFICANT CHANGE UP (ref 80–100)
NRBC # BLD: 0 /100 WBCS — SIGNIFICANT CHANGE UP (ref 0–0)
PLATELET # BLD AUTO: 329 K/UL — SIGNIFICANT CHANGE UP (ref 150–400)
POTASSIUM SERPL-MCNC: 4.5 MMOL/L — SIGNIFICANT CHANGE UP (ref 3.5–5.3)
POTASSIUM SERPL-SCNC: 4.5 MMOL/L — SIGNIFICANT CHANGE UP (ref 3.5–5.3)
PROT SERPL-MCNC: 6.5 G/DL — SIGNIFICANT CHANGE UP (ref 6–8.3)
RBC # BLD: 3.38 M/UL — LOW (ref 3.8–5.2)
RBC # FLD: 18 % — HIGH (ref 10.3–14.5)
SODIUM SERPL-SCNC: 138 MMOL/L — SIGNIFICANT CHANGE UP (ref 135–145)
WBC # BLD: 4.77 K/UL — SIGNIFICANT CHANGE UP (ref 3.8–10.5)
WBC # FLD AUTO: 4.77 K/UL — SIGNIFICANT CHANGE UP (ref 3.8–10.5)

## 2022-01-25 PROCEDURE — 99239 HOSP IP/OBS DSCHRG MGMT >30: CPT

## 2022-01-25 PROCEDURE — 85027 COMPLETE CBC AUTOMATED: CPT

## 2022-01-25 PROCEDURE — 84300 ASSAY OF URINE SODIUM: CPT

## 2022-01-25 PROCEDURE — 83935 ASSAY OF URINE OSMOLALITY: CPT

## 2022-01-25 PROCEDURE — 82553 CREATINE MB FRACTION: CPT

## 2022-01-25 PROCEDURE — 84484 ASSAY OF TROPONIN QUANT: CPT

## 2022-01-25 PROCEDURE — 75561 CARDIAC MRI FOR MORPH W/DYE: CPT

## 2022-01-25 PROCEDURE — 83540 ASSAY OF IRON: CPT

## 2022-01-25 PROCEDURE — 97116 GAIT TRAINING THERAPY: CPT

## 2022-01-25 PROCEDURE — 76700 US EXAM ABDOM COMPLETE: CPT

## 2022-01-25 PROCEDURE — 86664 EPSTEIN-BARR NUCLEAR ANTIGEN: CPT

## 2022-01-25 PROCEDURE — 85025 COMPLETE CBC W/AUTO DIFF WBC: CPT

## 2022-01-25 PROCEDURE — 86381 MITOCHONDRIAL ANTIBODY EACH: CPT

## 2022-01-25 PROCEDURE — 86658 ENTEROVIRUS ANTIBODY: CPT

## 2022-01-25 PROCEDURE — 84132 ASSAY OF SERUM POTASSIUM: CPT

## 2022-01-25 PROCEDURE — 82803 BLOOD GASES ANY COMBINATION: CPT

## 2022-01-25 PROCEDURE — 99232 SBSQ HOSP IP/OBS MODERATE 35: CPT

## 2022-01-25 PROCEDURE — 87449 NOS EACH ORGANISM AG IA: CPT

## 2022-01-25 PROCEDURE — 83735 ASSAY OF MAGNESIUM: CPT

## 2022-01-25 PROCEDURE — 86901 BLOOD TYPING SEROLOGIC RH(D): CPT

## 2022-01-25 PROCEDURE — 83930 ASSAY OF BLOOD OSMOLALITY: CPT

## 2022-01-25 PROCEDURE — 93970 EXTREMITY STUDY: CPT

## 2022-01-25 PROCEDURE — 86038 ANTINUCLEAR ANTIBODIES: CPT

## 2022-01-25 PROCEDURE — 86140 C-REACTIVE PROTEIN: CPT

## 2022-01-25 PROCEDURE — 86645 CMV ANTIBODY IGM: CPT

## 2022-01-25 PROCEDURE — 71045 X-RAY EXAM CHEST 1 VIEW: CPT

## 2022-01-25 PROCEDURE — U0003: CPT

## 2022-01-25 PROCEDURE — 86665 EPSTEIN-BARR CAPSID VCA: CPT

## 2022-01-25 PROCEDURE — 86705 HEP B CORE ANTIBODY IGM: CPT

## 2022-01-25 PROCEDURE — 86663 EPSTEIN-BARR ANTIBODY: CPT

## 2022-01-25 PROCEDURE — 85652 RBC SED RATE AUTOMATED: CPT

## 2022-01-25 PROCEDURE — A9577: CPT

## 2022-01-25 PROCEDURE — C1889: CPT

## 2022-01-25 PROCEDURE — C1894: CPT

## 2022-01-25 PROCEDURE — 82962 GLUCOSE BLOOD TEST: CPT

## 2022-01-25 PROCEDURE — 84100 ASSAY OF PHOSPHORUS: CPT

## 2022-01-25 PROCEDURE — 84702 CHORIONIC GONADOTROPIN TEST: CPT

## 2022-01-25 PROCEDURE — 86431 RHEUMATOID FACTOR QUANT: CPT

## 2022-01-25 PROCEDURE — 83880 ASSAY OF NATRIURETIC PEPTIDE: CPT

## 2022-01-25 PROCEDURE — 97161 PT EVAL LOW COMPLEX 20 MIN: CPT

## 2022-01-25 PROCEDURE — 83036 HEMOGLOBIN GLYCOSYLATED A1C: CPT

## 2022-01-25 PROCEDURE — 86706 HEP B SURFACE ANTIBODY: CPT

## 2022-01-25 PROCEDURE — 80307 DRUG TEST PRSMV CHEM ANLYZR: CPT

## 2022-01-25 PROCEDURE — 87899 AGENT NOS ASSAY W/OPTIC: CPT

## 2022-01-25 PROCEDURE — 85379 FIBRIN DEGRADATION QUANT: CPT

## 2022-01-25 PROCEDURE — U0005: CPT

## 2022-01-25 PROCEDURE — 84443 ASSAY THYROID STIM HORMONE: CPT

## 2022-01-25 PROCEDURE — 82787 IGG 1 2 3 OR 4 EACH: CPT

## 2022-01-25 PROCEDURE — 86850 RBC ANTIBODY SCREEN: CPT

## 2022-01-25 PROCEDURE — 86803 HEPATITIS C AB TEST: CPT

## 2022-01-25 PROCEDURE — 83550 IRON BINDING TEST: CPT

## 2022-01-25 PROCEDURE — 86709 HEPATITIS A IGM ANTIBODY: CPT

## 2022-01-25 PROCEDURE — 87340 HEPATITIS B SURFACE AG IA: CPT

## 2022-01-25 PROCEDURE — 86644 CMV ANTIBODY: CPT

## 2022-01-25 PROCEDURE — 86225 DNA ANTIBODY NATIVE: CPT

## 2022-01-25 PROCEDURE — 93321 DOPPLER ECHO F-UP/LMTD STD: CPT

## 2022-01-25 PROCEDURE — C1769: CPT

## 2022-01-25 PROCEDURE — C1887: CPT

## 2022-01-25 PROCEDURE — 85730 THROMBOPLASTIN TIME PARTIAL: CPT

## 2022-01-25 PROCEDURE — 36415 COLL VENOUS BLD VENIPUNCTURE: CPT

## 2022-01-25 PROCEDURE — 84145 PROCALCITONIN (PCT): CPT

## 2022-01-25 PROCEDURE — 85610 PROTHROMBIN TIME: CPT

## 2022-01-25 PROCEDURE — 71275 CT ANGIOGRAPHY CHEST: CPT

## 2022-01-25 PROCEDURE — 80053 COMPREHEN METABOLIC PANEL: CPT

## 2022-01-25 PROCEDURE — 83605 ASSAY OF LACTIC ACID: CPT

## 2022-01-25 PROCEDURE — 86235 NUCLEAR ANTIGEN ANTIBODY: CPT

## 2022-01-25 PROCEDURE — 93306 TTE W/DOPPLER COMPLETE: CPT

## 2022-01-25 PROCEDURE — 80048 BASIC METABOLIC PNL TOTAL CA: CPT

## 2022-01-25 PROCEDURE — 86900 BLOOD TYPING SEROLOGIC ABO: CPT

## 2022-01-25 PROCEDURE — 85018 HEMOGLOBIN: CPT

## 2022-01-25 PROCEDURE — 84295 ASSAY OF SERUM SODIUM: CPT

## 2022-01-25 PROCEDURE — 82330 ASSAY OF CALCIUM: CPT

## 2022-01-25 PROCEDURE — 84466 ASSAY OF TRANSFERRIN: CPT

## 2022-01-25 PROCEDURE — 82728 ASSAY OF FERRITIN: CPT

## 2022-01-25 PROCEDURE — 86769 SARS-COV-2 COVID-19 ANTIBODY: CPT

## 2022-01-25 PROCEDURE — 86704 HEP B CORE ANTIBODY TOTAL: CPT

## 2022-01-25 PROCEDURE — 82550 ASSAY OF CK (CPK): CPT

## 2022-01-25 RX ORDER — SPIRONOLACTONE 25 MG/1
1 TABLET, FILM COATED ORAL
Qty: 30 | Refills: 0
Start: 2022-01-25 | End: 2022-02-23

## 2022-01-25 RX ORDER — METOPROLOL TARTRATE 50 MG
1 TABLET ORAL
Qty: 30 | Refills: 0
Start: 2022-01-25 | End: 2022-02-23

## 2022-01-25 RX ORDER — METOPROLOL TARTRATE 50 MG
25 TABLET ORAL DAILY
Refills: 0 | Status: DISCONTINUED | OUTPATIENT
Start: 2022-01-26 | End: 2022-01-25

## 2022-01-25 RX ORDER — SACUBITRIL AND VALSARTAN 24; 26 MG/1; MG/1
1 TABLET, FILM COATED ORAL
Qty: 60 | Refills: 1
Start: 2022-01-25 | End: 2022-03-25

## 2022-01-25 RX ORDER — COLCHICINE 0.6 MG
1 TABLET ORAL
Qty: 36 | Refills: 0
Start: 2022-01-25 | End: 2022-02-11

## 2022-01-25 RX ADMIN — SACUBITRIL AND VALSARTAN 1 TABLET(S): 24; 26 TABLET, FILM COATED ORAL at 08:33

## 2022-01-25 RX ADMIN — ENOXAPARIN SODIUM 40 MILLIGRAM(S): 100 INJECTION SUBCUTANEOUS at 05:53

## 2022-01-25 RX ADMIN — Medication 0.6 MILLIGRAM(S): at 05:53

## 2022-01-25 RX ADMIN — Medication 12.5 MILLIGRAM(S): at 10:16

## 2022-01-25 RX ADMIN — SPIRONOLACTONE 25 MILLIGRAM(S): 25 TABLET, FILM COATED ORAL at 05:53

## 2022-01-25 RX ADMIN — PANTOPRAZOLE SODIUM 40 MILLIGRAM(S): 20 TABLET, DELAYED RELEASE ORAL at 05:58

## 2022-01-25 NOTE — DISCHARGE NOTE NURSING/CASE MANAGEMENT/SOCIAL WORK - PATIENT PORTAL LINK FT
You can access the FollowMyHealth Patient Portal offered by [facility] by registering at the following website: [email or web address]. By joining [employer]’s FollowMyHealth portal, you will also be able to view your health information using other applications (apps) compatible with our system.

## 2022-01-25 NOTE — PROGRESS NOTE ADULT - PROBLEM SELECTOR PROBLEM 1
Cardiogenic shock
Acute systolic congestive heart failure
Cardiogenic shock
Acute systolic congestive heart failure

## 2022-01-25 NOTE — PROGRESS NOTE ADULT - SUBJECTIVE AND OBJECTIVE BOX
INTERVAL HPI/OVERNIGHT EVENTS: Patient seen and examined at bedside.     Home Medications:  Multiple Vitamins oral tablet: 1 tab(s) orally once a day (13 Jan 2022 23:04)      VITAL SIGNS:  T(F): 98.6 (01-25-22 @ 08:53)  HR: 107 (01-25-22 @ 10:15)  BP: 94/58 (01-25-22 @ 10:15)  RR: 16 (01-25-22 @ 08:32)  SpO2: 99% (01-25-22 @ 08:32)  Wt(kg): --    01-24-22 @ 07:01  -  01-25-22 @ 07:00  --------------------------------------------------------  IN: 420 mL / OUT: 1100 mL / NET: -680 mL    01-25-22 @ 07:01  -  01-25-22 @ 11:25  --------------------------------------------------------  IN: 240 mL / OUT: 200 mL / NET: 40 mL        PHYSICAL EXAM:    GEN: Awake, comfortable. NAD.   HEENT: NCAT, PERRL, EOMI. Mucosa moist.   NECK: Supple, no JVD.   RESP: CTA b/l  CV: RRR, normal s1/s2. No m/r/g.  ABD: Soft, NTND. BS+  EXT: Warm. No edema, clubbing, or cyanosis.   NEURO: AAOx3. No focal deficits.    MEDICATIONS  (STANDING):  colchicine 0.6 milliGRAM(s) Oral every 12 hours  enoxaparin Injectable 40 milliGRAM(s) SubCutaneous every 12 hours  metoprolol succinate ER 12.5 milliGRAM(s) Oral every 24 hours  pantoprazole    Tablet 40 milliGRAM(s) Oral before breakfast  sacubitril 24 mG/valsartan 26 mG 1 Tablet(s) Oral every 12 hours  spironolactone 25 milliGRAM(s) Oral every 24 hours    MEDICATIONS  (PRN):  acetaminophen     Tablet .. 650 milliGRAM(s) Oral every 6 hours PRN Mild Pain (1 - 3)  aluminum hydroxide/magnesium hydroxide/simethicone Suspension 30 milliLiter(s) Oral every 4 hours PRN Dyspepsia      Allergies    No Known Allergies    Intolerances        LABS:                        10.3   4.77  )-----------( 329      ( 25 Jan 2022 06:10 )             32.5     01-25    138  |  106  |  14  ----------------------------<  104<H>  4.5   |  21<L>  |  0.68    Ca    8.9      25 Jan 2022 06:10  Mg     2.2     01-25    TPro  6.5  /  Alb  3.7  /  TBili  0.4  /  DBili  x   /  AST  60<H>  /  ALT  82<H>  /  AlkPhos  159<H>  01-25    PT/INR - ( 24 Jan 2022 06:38 )   PT: 15.0 sec;   INR: 1.26          PTT - ( 24 Jan 2022 06:38 )  PTT:32.4 sec        ECHO:   INTERVAL HPI/OVERNIGHT EVENTS: Patient seen and examined at bedside. Doing well, had slight headache earlier that resolved. No dyspnea, chest pain, palpitations.     Home Medications:  Multiple Vitamins oral tablet: 1 tab(s) orally once a day (13 Jan 2022 23:04)      VITAL SIGNS:  T(F): 98.6 (01-25-22 @ 08:53)  HR: 107 (01-25-22 @ 10:15)  BP: 94/58 (01-25-22 @ 10:15)  RR: 16 (01-25-22 @ 08:32)  SpO2: 99% (01-25-22 @ 08:32)  Wt(kg): --    01-24-22 @ 07:01  -  01-25-22 @ 07:00  --------------------------------------------------------  IN: 420 mL / OUT: 1100 mL / NET: -680 mL    01-25-22 @ 07:01  -  01-25-22 @ 11:25  --------------------------------------------------------  IN: 240 mL / OUT: 200 mL / NET: 40 mL        PHYSICAL EXAM:    GEN: Awake, comfortable. NAD.   HEENT: NCAT, PERRL, EOMI. Mucosa moist.   NECK: Supple, no JVD.   RESP: CTA b/l  CV: RRR, normal s1/s2. No m/r/g.  ABD: Soft, NTND. BS+  EXT: Warm. No edema, clubbing, or cyanosis.   NEURO: AAOx3. No focal deficits.    MEDICATIONS  (STANDING):  colchicine 0.6 milliGRAM(s) Oral every 12 hours  enoxaparin Injectable 40 milliGRAM(s) SubCutaneous every 12 hours  metoprolol succinate ER 12.5 milliGRAM(s) Oral every 24 hours  pantoprazole    Tablet 40 milliGRAM(s) Oral before breakfast  sacubitril 24 mG/valsartan 26 mG 1 Tablet(s) Oral every 12 hours  spironolactone 25 milliGRAM(s) Oral every 24 hours    MEDICATIONS  (PRN):  acetaminophen     Tablet .. 650 milliGRAM(s) Oral every 6 hours PRN Mild Pain (1 - 3)  aluminum hydroxide/magnesium hydroxide/simethicone Suspension 30 milliLiter(s) Oral every 4 hours PRN Dyspepsia      Allergies    No Known Allergies    Intolerances        LABS:                        10.3   4.77  )-----------( 329      ( 25 Jan 2022 06:10 )             32.5     01-25    138  |  106  |  14  ----------------------------<  104<H>  4.5   |  21<L>  |  0.68    Ca    8.9      25 Jan 2022 06:10  Mg     2.2     01-25    TPro  6.5  /  Alb  3.7  /  TBili  0.4  /  DBili  x   /  AST  60<H>  /  ALT  82<H>  /  AlkPhos  159<H>  01-25    PT/INR - ( 24 Jan 2022 06:38 )   PT: 15.0 sec;   INR: 1.26          PTT - ( 24 Jan 2022 06:38 )  PTT:32.4 sec        ECHO:  < from: TTE Limited Echo w/o Cont (01.24.22 @ 10:28) >  ---------------------------------------------------------------------------  -----  CONCLUSIONS:     1. Limited study obtained for evaluation of left ventricular function.   2. Severe hypokinesis of the apical anterior, apical inferior, apical   septal, and mid to apical lateralextending to the apex. The estimated   left ventricular ejection fraction is 20%.   3. The right ventricle is normal in size. Right ventricular systolic   function is normal.   4. There is trace tricuspid regurgitation. There is no echocardiographic   evidence of pulmonary hypertension, pulmonary artery systolic pressure is   37 mmHg.   5. No pericardial effusion is seen.   6. Compared to the previous TTE performed on 1/19/2022, there have been   no significant interval changes.    ---------------------------------------------------------------------------    < end of copied text >  < from: TTE Echo Complete w/o Contrast w/ Doppler (01.19.22 @ 16:19) >  -----  CONCLUSIONS:     1. Patient was tachycardic during the study.   2. Severely reduced left ventricular systolic function. Ejection   fraction 15-20%.   3. The apical septal segment, apical lateral segment, and apex are   akinetic. Wall motion of basal inferoseptum is relatively preserved.   Remaining segments are hypokinetic.   4. Normal right ventricular size.   5. Probably normal right ventricular systolic function.   6. Mild mitral regurgitation.   7. Mild tricuspid regurgitation.   8. No evidence of pulmonary hypertension.   9. Trivial pericardial effusion without echocardiographic evidence of   cardiac tamponade physiology.  10. Compared to the previous TTE performed on 1/14/2020, there have been   no significant interval changes.    ---------------------------------------------------------------------------    < end of copied text >  < from: MR Cardiac w/wo IV Cont (01.22.22 @ 13:59) >  IMPRESSION:    1.  The left ventricle (LV) is normal in size. There is  no evidence of   LV hypertrophy . Left ventricular global systolic function is decreased.   There is akinesis of the base anterolateral, mid anterior and   anterolateral, and apical segments. The LV ejection fraction is 23 %.  2.  The right ventricle (RV) is normal in size. RV global systolic   function is normal. The RV ejection fraction is 53 %.  3.  Mild mitral and aortic insufficiency.  4.  No significant abnormalities of the visualized portions of the great   vessels.  5.  Small pericardial effusion.  6.  Small bilateral pleural effusions.  7.  On T2 weighted imaging there is increased signal in the mid anterior,   mid anterolateral segments, and apical anterior consistent with edema.  8.  On delayed enhancement imaging,  there is extensive hyperenhancement   in a patchy and mid myocardial pattern as described above as well as   circumferential pericardial hyperenhancement suggestive of   myopericarditis.      < end of copied text >

## 2022-01-25 NOTE — DISCHARGE NOTE NURSING/CASE MANAGEMENT/SOCIAL WORK - NSDCPEFALRISK_GEN_ALL_CORE
For information on Fall & Injury Prevention, visit: [email or web address] OR  [email or web address] OR  https://www.cdc.gov/steadi/patient.html

## 2022-01-25 NOTE — PROGRESS NOTE ADULT - ATTENDING COMMENTS
35 YO F with a history of morbid obesity and chronic anemia from menorrhagia who presented to [facility] with days-weeks of worsening chest pain and heart failure symptoms and found to have elevated troponin levels with diffuse nonspecific ST elevations associated with severe LV dysfunction. She had signs of end organ perfusion (lactate 2.4, transaminitis) and underwent R/LHC 1/14 which revealed severely elevated filing pressures and low cardiac output prompting placement of IABP. Her hemodynamics improved and IABP was successfully removed and inotropes were weaned off while initiating low doses of GDMT. Cardiac MRI was consistent with a diagnosis of myopericarditis. She remains with sinus tachycardia and borderline blood pressure but is asymptomatic and TTE appears to show some mild quantitative improvement.     She is stable for discharge today and have arranged close follow up in less than 1 week.       HEMODYNAMICS  1/18 (IABP 1:1, nipride 1.5): RA 9, PA 40/20 (27), PCWP 20, PA sat 68% with [place] CO/CI 7.6/3.7. BP 89/60 with IABP on standby and on nipride.   1/19 (milrinone 0.25, nipride 1.5): RA 8, PA 30/12, PCWP 11, PA sat 71% with Augustin CO/CI 7.4/3.6  1/20 (milrinone 0.25): Augustin CO/CI 7.3/3.5    REVIEW OF STUDIES  TTE 1/14: LV 4.3 cm, LVEF 15% with relatively preserved basal motion, LVOT VTI 11 cm, grossly normal RV function, no significant valvular abnormalities:   TTE 1/19: LV 4.3 cm, LVEF visually 20-25% with global hypokinesis worse in the apex, LVOT VTI 13 cm, normal RV size/function  TTE 1/24: LVEF read as 20% but visually appears 25-30% and is clearly improved from presentation TTE     C 1/14/22: RA 17, PA 45/33 (38), PCWP 35, PA sat 27% with [place]in CO/CI 2.8/1.4  C 1/14/22: normal coronary arteries     Cardiac MRI: LVEF 27%, RVEF 53%, increased T2 signals consistent with edema, extensive patchy LGE with circumferential pericaridal hyperenhancement suggestive of myopericarditis     COVID PCR: negative. COVID spike and nucleocapsid Ab positive.    PLAN  # Acute systolic heart failure with cardiogenic shock  - GDMT: continue entresto 24-26 mg BID, increase metoprolol succinate to 25 mg daily, and continue spironolactone 25 mg daily  - Diuretics: continue off diuretics  - Device: needs reassessment of LVEF at 3 months (4/2022)    # Myopericarditis  - Reasonable to continue colchicine, will plan to continue for 2-3 months  - ESR 72 and  on admission, will recheck at f/u visit next week   - discussed importance of strenuous exercise and competitive sports for 3 months     # Hyponatremia  - resolved    # Anemia  - improved with IV iron     # Transaminitis  - had improved but now slightly worsening though asymptomatic  - will repeat LFT's at f/u visit, will consider discontinuation of colchicine if still rising     # Hypoxia  - CTA negative for PE, she is no longer requiring supplemental oxygen since diuresis

## 2022-01-25 NOTE — PROGRESS NOTE ADULT - PROBLEM SELECTOR PLAN 1
- c/w Entresto 24-26 bid  - c/w Spironolactone 25mg PO QD  - increase Toprol-XL to 25mg PO QD  - can discharge off diuretic    Pt to f/u Monday in clinic with Dr. Rene

## 2022-01-25 NOTE — PROGRESS NOTE ADULT - PROVIDER SPECIALTY LIST ADULT
CCU
Heart Failure
CCU
CCU
Cardiology
Heart Failure
Cardiology
Cardiology

## 2022-01-25 NOTE — PROGRESS NOTE ADULT - ATTENDING SUPERVISION STATEMENT
Resident/Fellow
Fellow
Fellow
Resident/Fellow
Resident/Fellow
Fellow
Fellow
ACP
Fellow
ACP

## 2022-01-25 NOTE — PROGRESS NOTE ADULT - ASSESSMENT
36F w/hx of anemia transferred from [facility] on 1/13/22 with myopericarditis complicated by cardiogenic shock.

## 2022-01-27 NOTE — PATIENT PROFILE ADULT - CONTRAINDICATIONS & PRECAUTIONS (SELECT ALL THAT APPLY)
How Severe Are Your Spot(S)?: mild What Type Of Note Output Would You Prefer (Optional)?: Standard Output What Is The Reason For Today's Visit?: Full Body Skin Examination What Is The Reason For Today's Visit? (Being Monitored For X): concerning skin lesions on an annual basis none...

## 2022-01-28 DIAGNOSIS — I50.21 ACUTE SYSTOLIC (CONGESTIVE) HEART FAILURE: ICD-10-CM

## 2022-01-28 DIAGNOSIS — J96.01 ACUTE RESPIRATORY FAILURE WITH HYPOXIA: ICD-10-CM

## 2022-01-28 DIAGNOSIS — E66.01 MORBID (SEVERE) OBESITY DUE TO EXCESS CALORIES: ICD-10-CM

## 2022-01-28 DIAGNOSIS — J90 PLEURAL EFFUSION, NOT ELSEWHERE CLASSIFIED: ICD-10-CM

## 2022-01-28 DIAGNOSIS — I30.9 ACUTE PERICARDITIS, UNSPECIFIED: ICD-10-CM

## 2022-01-28 DIAGNOSIS — N92.0 EXCESSIVE AND FREQUENT MENSTRUATION WITH REGULAR CYCLE: ICD-10-CM

## 2022-01-28 DIAGNOSIS — I25.10 ATHEROSCLEROTIC HEART DISEASE OF NATIVE CORONARY ARTERY WITHOUT ANGINA PECTORIS: ICD-10-CM

## 2022-01-28 DIAGNOSIS — R57.0 CARDIOGENIC SHOCK: ICD-10-CM

## 2022-01-28 DIAGNOSIS — R00.0 TACHYCARDIA, UNSPECIFIED: ICD-10-CM

## 2022-01-28 DIAGNOSIS — R51.9 HEADACHE, UNSPECIFIED: ICD-10-CM

## 2022-01-28 DIAGNOSIS — E87.1 HYPO-OSMOLALITY AND HYPONATREMIA: ICD-10-CM

## 2022-01-28 DIAGNOSIS — I95.9 HYPOTENSION, UNSPECIFIED: ICD-10-CM

## 2022-01-28 DIAGNOSIS — U09.9 POST COVID-19 CONDITION, UNSPECIFIED: ICD-10-CM

## 2022-01-28 DIAGNOSIS — D64.9 ANEMIA, UNSPECIFIED: ICD-10-CM

## 2022-01-28 PROBLEM — Z00.00 ENCOUNTER FOR PREVENTIVE HEALTH EXAMINATION: Status: ACTIVE | Noted: 2022-01-28

## 2022-01-28 LAB
CV B1 AB TITR FLD: HIGH
CV B2 AB TITR FLD: HIGH
CV B3 AB TITR FLD: HIGH
CV B4 AB TITR FLD: HIGH
CV B5 AB TITR FLD: HIGH
CV B6 AB TITR FLD: HIGH

## 2022-02-01 ENCOUNTER — NON-APPOINTMENT (OUTPATIENT)
Age: 37
End: 2022-02-01

## 2022-02-01 ENCOUNTER — APPOINTMENT (OUTPATIENT)
Dept: HEART AND VASCULAR | Facility: CLINIC | Age: 37
End: 2022-02-01
Payer: MEDICAID

## 2022-02-01 VITALS
SYSTOLIC BLOOD PRESSURE: 97 MMHG | TEMPERATURE: 98 F | HEIGHT: 61 IN | DIASTOLIC BLOOD PRESSURE: 59 MMHG | OXYGEN SATURATION: 98 % | HEART RATE: 118 BPM | WEIGHT: 183 LBS | BODY MASS INDEX: 34.55 KG/M2

## 2022-02-01 PROCEDURE — 93000 ELECTROCARDIOGRAM COMPLETE: CPT

## 2022-02-01 PROCEDURE — 99215 OFFICE O/P EST HI 40 MIN: CPT | Mod: 25

## 2022-02-01 NOTE — PHYSICAL EXAM
[Well Developed] : well developed [Well Nourished] : well nourished [Normal Venous Pressure] : normal venous pressure [Clear Lung Fields] : clear lung fields [Good Air Entry] : good air entry [Soft] : abdomen soft [Non Tender] : non-tender [Normal Gait] : normal gait [Moves all extremities] : moves all extremities [No Focal Deficits] : no focal deficits [Alert and Oriented] : alert and oriented [Normal memory] : normal memory [de-identified] : Tachycardic, no murmurs, no gallop  [de-identified] : Warm, no edema

## 2022-02-01 NOTE — DISCUSSION/SUMMARY
[FreeTextEntry1] : # Acute systolic heart failure \par - GDMT: current regimen is entresto 24-26 mg BID, metoprolol succinate  25 mg daily, and spironolactone 25 mg daily. will trial entresto 49-51 mg BID and increase of metoprolol to 50 mg daily in 1 week. I am unsure if she will tolerate and I discussed signs and symptoms of hypotension. she is taking daily BP and will e-mail me if not tolerating this increase \par - Diuretics: continue off diuretics. she is at stable euvolemic discharge weight of 183-185 lbs \par - Device: needs reassessment of LVEF at 3 months (4/2022)\par - Labs: check today\par - Advanced therapies: as above, if no improvement at 3 months or worsening symptoms/labs until then will need to consider evaluation for LVAD/OHT but ideally will have recovery \par \par # Myopericarditis\par - Reasonable to continue colchicine, will plan to continue for 2-3 months\par - ESR 72 and  on admission, will recheck today\par - discussed importance of cessation from strenuous exercise and competitive sports as well as sexual activity for 3 months \par \par # Iron deficiency anemia\par - received IV iron 1/2022\par \par # Transaminitis\par - AST 60 and ALT 82 on discharge will recheck today \par \par Return to clinic in 3 weeks. She has my e-mail address in case symptoms worsen in the interim. \par

## 2022-02-01 NOTE — HISTORY OF PRESENT ILLNESS
[FreeTextEntry1] : 37 YO F with a history of myopericarditis resulting in cardiogenic shock 1/2022 and now with ACC/AHA Stage C NICM (LV 4.3 cm, LVEF 20%), obesity (BMI 35), and chronic iron deficiency anemia from menorrhagia who is presenting to HF clinic for further management.\par \par She presented to [facility] 1/2022 with days-weeks of worsening chest pain and heart failure symptoms and found to have elevated troponin levels with diffuse nonspecific ST elevations associated with severe LV dysfunction. She was transferred to [facility] and had signs of end organ perfusion (lactate 2.4, transaminitis) and underwent R/LHC 1/14 which revealed severely elevated filing pressures and low cardiac output prompting placement of IABP. Her hemodynamics improved and IABP was successfully removed and inotropes were weaned off while initiating low doses of GDMT. Cardiac MRI was consistent with a diagnosis of myopericarditis. She remained with sinus tachycardia and borderline blood pressure but was asymptomatic and TTE appears to show some mild quantitative improvement. \par \par She presents today for followup after hospitalization. She states she has been feeling well overall. Her weights have been stable at 182 lbs at home. She does note some dyspnea when she is laying flat and when she is exerting herself in the cold. She can walk 4 blocks at this time limited by dyspnea and back pain. She notes palpitations when climbing more than 10 steps. She was able to walk and use the subway to get to clinic today. Her blood pressures at home are mostly in the 90's/60's. Denies any dizziness/LH. Notes occasional palpitations described as a sensation that she is scared and lasts 5 seconds at a time. Overall she feels like she is slowly improving since being home.  She rates her quality of life at this time a 7/10. \par \par

## 2022-02-01 NOTE — CARDIOLOGY SUMMARY
[de-identified] : \par EKG 1/2022: sinus tachycardia (), borderline low voltage\par  [de-identified] : \par TTE 1/14: LV 4.3 cm, LVEF 15% with relatively preserved basal motion, LVOT VTI 11 cm, grossly normal RV function, no significant valvular abnormalities: \par TTE 1/19: LV 4.3 cm, LVEF visually 20-25% with global hypokinesis worse in the apex, LVOT VTI 13 cm, normal RV size/function\par TTE 1/24: LVEF read as 20% but visually appears 25-30% and is clearly improved from presentation TTE \par  [de-identified] : \par Cardiac MRI 1/2022: LVEF 27%, RVEF 53%, increased T2 signals consistent with edema, extensive patchy LGE with circumferential pericaridal hyperenhancement suggestive of myopericarditis \par  [de-identified] : \par [facility] 1/14/22: RA 17, PA 45/33 (38), PCWP 35, PA sat 27% with Augustin CO/CI 2.8/1.4\par [facility] 1/14/22: normal coronary arteries \par \par [facility] 1/19/22 (CCU, milrinone 0.25, nipride 1.5): RA 8, PA 30/12, PCWP 11, PA sat 71% with Augustin CO/CI 7.4/3.6\par \par

## 2022-02-01 NOTE — ASSESSMENT
[FreeTextEntry1] : 37 YO F with a history of myopericarditis resulting in cardiogenic shock 1/2022 and now with ACC/AHA Stage C NICM (LV 4.3 cm, LVEF 20%), obesity (BMI 35), and chronic iron deficiency anemia from menorrhagia who is presenting to HF clinic for further management.\par \par Today she reports NYHA II symptoms and appears euvolemic on exam. Her heart rate was very elevated on intake but improved after some rest.\par \par She has high risk features including borderline blood pressures and continued elevated heart rate but given her diagnosis, will watch closely for signs of recovery with time and heart failure medical therapy. If no improvement at 3 months or with lab abnormalities or worsening symptoms before then would need to consider advanced therapies evaluation. \par

## 2022-02-02 LAB
ALBUMIN SERPL ELPH-MCNC: 4.3 G/DL
ALP BLD-CCNC: 114 U/L
ALT SERPL-CCNC: 62 U/L
ANION GAP SERPL CALC-SCNC: 14 MMOL/L
AST SERPL-CCNC: 36 U/L
BILIRUB SERPL-MCNC: 0.3 MG/DL
BUN SERPL-MCNC: 11 MG/DL
CALCIUM SERPL-MCNC: 9.6 MG/DL
CHLORIDE SERPL-SCNC: 105 MMOL/L
CO2 SERPL-SCNC: 24 MMOL/L
CREAT SERPL-MCNC: 0.84 MG/DL
CRP SERPL-MCNC: <3 MG/L
ERYTHROCYTE [SEDIMENTATION RATE] IN BLOOD BY WESTERGREN METHOD: 9 MM/HR
GLUCOSE SERPL-MCNC: 93 MG/DL
NT-PROBNP SERPL-MCNC: 2877 PG/ML
POTASSIUM SERPL-SCNC: 4.1 MMOL/L
PROT SERPL-MCNC: 6.7 G/DL
SODIUM SERPL-SCNC: 144 MMOL/L

## 2022-02-09 ENCOUNTER — NON-APPOINTMENT (OUTPATIENT)
Age: 37
End: 2022-02-09

## 2022-02-22 ENCOUNTER — APPOINTMENT (OUTPATIENT)
Dept: HEART AND VASCULAR | Facility: CLINIC | Age: 37
End: 2022-02-22
Payer: MEDICAID

## 2022-02-22 VITALS
BODY MASS INDEX: 34.74 KG/M2 | HEIGHT: 61 IN | WEIGHT: 184 LBS | DIASTOLIC BLOOD PRESSURE: 61 MMHG | TEMPERATURE: 97.3 F | HEART RATE: 92 BPM | SYSTOLIC BLOOD PRESSURE: 96 MMHG | OXYGEN SATURATION: 98 %

## 2022-02-22 PROCEDURE — 99214 OFFICE O/P EST MOD 30 MIN: CPT

## 2022-02-22 NOTE — ASSESSMENT
[FreeTextEntry1] : 37 YO F with a history of myopericarditis resulting in cardiogenic shock 1/2022 and now with ACC/AHA Stage C NICM (LV 4.3 cm, LVEF 20%), obesity (BMI 35), and chronic iron deficiency anemia from menorrhagia who is presenting to HF clinic for further management.\par \par Today she reports improved NYHA I symptoms and appears euvolemic on exam. Will continue to augment neurohormonal therapy and repeat TTE next visit to assess for possible recovery. \par \par

## 2022-02-22 NOTE — PHYSICAL EXAM
[Well Developed] : well developed [Normal Conjunctiva] : normal conjunctiva [Normal Venous Pressure] : normal venous pressure [Normal S1, S2] : normal S1, S2 [No Murmur] : no murmur [Clear Lung Fields] : clear lung fields [Good Air Entry] : good air entry [Soft] : abdomen soft [Non Tender] : non-tender [Normal Gait] : normal gait [No Edema] : no edema [Moves all extremities] : moves all extremities [Alert and Oriented] : alert and oriented [Normal memory] : normal memory [de-identified] : Warm,

## 2022-02-22 NOTE — HISTORY OF PRESENT ILLNESS
[FreeTextEntry1] : 37 YO F with a history of myopericarditis resulting in cardiogenic shock 1/2022 and now with ACC/AHA Stage C NICM (LV 4.3 cm, LVEF 20%), obesity (BMI 35), and chronic iron deficiency anemia from menorrhagia who is presenting to HF clinic for further management.\par \par She presented to [facility] 1/2022 with days-weeks of worsening chest pain and heart failure symptoms and found to have elevated troponin levels with diffuse nonspecific ST elevations associated with severe LV dysfunction. She was transferred to [facility] and had signs of end organ perfusion (lactate 2.4, transaminitis) and underwent R/C 1/14 which revealed severely elevated filing pressures and low cardiac output prompting placement of IABP. Her hemodynamics improved and IABP was successfully removed and inotropes were weaned off while initiating low doses of GDMT. Cardiac MRI was consistent with a diagnosis of myopericarditis. She remained with sinus tachycardia and borderline blood pressure but was asymptomatic and TTE showed some mild quantitative improvement. \par \par Since then she has followed up in HF clinic and tolerated increase in HF medical therapy with improvement in symptoms. \par \par She presents today for followup. She continues to feel better overall. She is no longer getting dyspnea or chest pain. She states she can walk unlimited distances without having to stop. Her palpitations have resolved. Denies any dizziness or lightheadedness. Denies orthopnea or lower extremity edema. QOL at this time is a 10/10 and she feels back to her normal self. \par \par \par \par

## 2022-02-22 NOTE — DISCUSSION/SUMMARY
[FreeTextEntry1] : # Acute systolic heart failure \par - GDMT: current regimen is entresto 49-51 mg BID, metoprolol succinate 50 mg daily, and spironolactone 25 mg daily. will trial entresto  mg BID and asked to increase metoprolol to 100 mg daily in 2 weeks. counseled on BP checks (she logs daily BP readings at home) and symptoms of hypotension as her borderline BP may not tolerate these changes \par - Diuretics: currently on lasix 20 mg daily, advised to make PRN after increasing Entresto. she is at stable euvolemic discharge weight of 183-185 lbs \par - Device: will obtain TTE at next visit to determine need for ICD \par - Labs: check today\par \par # Myopericarditis\par - Reasonable to continue colchicine, will plan to continue for 2-3 months (will stop next visit) \par - ESR 72 and  on her index admission, now normalized \par - discussed importance of cessation from strenuous exercise and competitive sports as well as sexual activity for 3 months \par \par # Iron deficiency anemia\par - received IV iron 1/2022\par \par # Transaminitis\par - mostly resolved, will recheck today \par \par Return to clinic in 6-7 weeks with echo. She has my e-mail address in case symptoms worsen in the interim. \par

## 2022-02-22 NOTE — CARDIOLOGY SUMMARY
[de-identified] : \par EKG 1/2022: sinus tachycardia (), borderline low voltage\par  [de-identified] : \par TTE 1/14: LV 4.3 cm, LVEF 15% with relatively preserved basal motion, LVOT VTI 11 cm, grossly normal RV function, no significant valvular abnormalities: \par TTE 1/19: LV 4.3 cm, LVEF visually 20-25% with global hypokinesis worse in the apex, LVOT VTI 13 cm, normal RV size/function\par TTE 1/24: LVEF read as 20% but visually appears 25-30% and is clearly improved from presentation TTE \par  [de-identified] : \par Cardiac MRI 1/2022: LVEF 27%, RVEF 53%, increased T2 signals consistent with edema, extensive patchy LGE with circumferential pericaridal hyperenhancement suggestive of myopericarditis \par  [de-identified] : \par [facility] 1/14/22: RA 17, PA 45/33 (38), PCWP 35, PA sat 27% with Augustin CO/CI 2.8/1.4\par [facility] 1/14/22: normal coronary arteries \par \par [facility] 1/19/22 (CCU, milrinone 0.25, nipride 1.5): RA 8, PA 30/12, PCWP 11, PA sat 71% with Augustin CO/CI 7.4/3.6\par \par

## 2022-02-23 LAB
ALBUMIN SERPL ELPH-MCNC: 4.4 G/DL
ALP BLD-CCNC: 85 U/L
ALT SERPL-CCNC: 81 U/L
ANION GAP SERPL CALC-SCNC: 13 MMOL/L
AST SERPL-CCNC: 55 U/L
BILIRUB SERPL-MCNC: 0.4 MG/DL
BUN SERPL-MCNC: 9 MG/DL
CALCIUM SERPL-MCNC: 9.5 MG/DL
CHLORIDE SERPL-SCNC: 105 MMOL/L
CO2 SERPL-SCNC: 24 MMOL/L
CREAT SERPL-MCNC: 0.8 MG/DL
GLUCOSE SERPL-MCNC: 88 MG/DL
NT-PROBNP SERPL-MCNC: 2107 PG/ML
POTASSIUM SERPL-SCNC: 4.1 MMOL/L
PROT SERPL-MCNC: 6.5 G/DL
SODIUM SERPL-SCNC: 141 MMOL/L

## 2022-04-04 ENCOUNTER — NON-APPOINTMENT (OUTPATIENT)
Age: 37
End: 2022-04-04

## 2022-04-18 ENCOUNTER — NON-APPOINTMENT (OUTPATIENT)
Age: 37
End: 2022-04-18

## 2022-04-18 ENCOUNTER — APPOINTMENT (OUTPATIENT)
Dept: HEART AND VASCULAR | Facility: CLINIC | Age: 37
End: 2022-04-18
Payer: MEDICAID

## 2022-04-18 VITALS
DIASTOLIC BLOOD PRESSURE: 69 MMHG | TEMPERATURE: 97.4 F | SYSTOLIC BLOOD PRESSURE: 100 MMHG | HEART RATE: 70 BPM | HEIGHT: 61 IN | OXYGEN SATURATION: 97 % | BODY MASS INDEX: 34.74 KG/M2 | WEIGHT: 184 LBS

## 2022-04-18 VITALS
HEIGHT: 61 IN | SYSTOLIC BLOOD PRESSURE: 105 MMHG | BODY MASS INDEX: 35.12 KG/M2 | DIASTOLIC BLOOD PRESSURE: 74 MMHG | WEIGHT: 186 LBS

## 2022-04-18 DIAGNOSIS — R00.2 PALPITATIONS: ICD-10-CM

## 2022-04-18 PROCEDURE — 93306 TTE W/DOPPLER COMPLETE: CPT

## 2022-04-18 PROCEDURE — 99214 OFFICE O/P EST MOD 30 MIN: CPT

## 2022-04-18 PROCEDURE — 93000 ELECTROCARDIOGRAM COMPLETE: CPT

## 2022-04-18 RX ORDER — COLCHICINE 0.6 MG/1
0.6 TABLET ORAL TWICE DAILY
Qty: 60 | Refills: 1 | Status: DISCONTINUED | COMMUNITY
Start: 1900-01-01 | End: 2022-04-18

## 2022-04-18 NOTE — HISTORY OF PRESENT ILLNESS
[FreeTextEntry1] : 37 YO F with a history of HF with recovered LVEF due to myopericarditis, obesity (BMI 35), and chronic iron deficiency anemia from menorrhagia who is presenting to HF clinic for further management.\par \par She presented to [facility] 1/2022 with days-weeks of worsening chest pain and heart failure symptoms and found to have elevated troponin levels with diffuse nonspecific ST elevations associated with severe LV dysfunction. She was transferred to [facility] and had signs of end organ perfusion (lactate 2.4, transaminitis) and underwent R/LHC 1/14 which revealed severely elevated filing pressures and low cardiac output prompting placement of IABP. Her hemodynamics improved and IABP was successfully removed and inotropes were weaned off while initiating low doses of GDMT. Cardiac MRI was consistent with a diagnosis of myopericarditis. She remained with sinus tachycardia and borderline blood pressure but was asymptomatic and TTE showed some mild quantitative improvement. \par \par Since then she has followed up in HF clinic and has tolerated maximization of HF medical therapy. Her subsequent TTE showed normalization of LV function.\par \par She presents today for followup. She notes sensation of palpitations usually occurs 3-4 times per day and lasts seconds at a time. She describes it as a sensation of extra heart beats. Denies any syncope. She only drinks decaf coffee. Notes rare intermittent wine intake that does not effect the palpitations. Denies dyspnea on exertion but notes some pressure in the back of her neck when she is ambulating. Denies lower extremity edema. Notes  occasional orthopnea. Weight is stable at home. She notes significant anxiety. \par \par \par \par

## 2022-04-18 NOTE — DISCUSSION/SUMMARY
[FreeTextEntry1] : # Acute systolic heart failure \par - GDMT: current regimen is entresto  mg BID, metoprolol succinate 100 mg daily, and spironolactone 25 mg daily. given recovered LVEF will defer SGLT2i. importance of longterm HF medical therapy discussed with patient \par - Diuretics: currently on lasix 40 mg daily, will continue and she is euvolemic at discharge weight of 183-185 lbs \par - Device: no indication given recovered LVEF\par - Labs: check today including BNP\par - Will repeat TTE ~4/2023 for surveillance \par \par # Palpitations\par - of note she felt palpitations during the EKG obtained today which was normal\par - will obtain 7 day Ziopatch \par \par # Myopericarditis\par - discontinue colchicine \par \par # Iron deficiency anemia\par - received IV iron 1/2022\par \par # Transaminitis\par - mostly resolved, will recheck today \par \par Return to clinic in 6 months

## 2022-04-18 NOTE — ASSESSMENT
[FreeTextEntry1] : 37 YO F with a history of HF with recovered LVEF due to myopericarditis, obesity (BMI 35), and chronic iron deficiency anemia from menorrhagia who is presenting to HF clinic for further management.\par \par Will continue HF medical therapy to maintain remission. \par \par

## 2022-04-18 NOTE — PHYSICAL EXAM
[Well Developed] : well developed [Normal Conjunctiva] : normal conjunctiva [Normal Venous Pressure] : normal venous pressure [Normal S1, S2] : normal S1, S2 [No Murmur] : no murmur [Clear Lung Fields] : clear lung fields [Good Air Entry] : good air entry [Soft] : abdomen soft [Non Tender] : non-tender [Normal Gait] : normal gait [No Edema] : no edema [Moves all extremities] : moves all extremities [Alert and Oriented] : alert and oriented [Normal memory] : normal memory [de-identified] : Warm,

## 2022-04-18 NOTE — CARDIOLOGY SUMMARY
[de-identified] : \par EKG 4/2022: sinus rhythm (HR 68), low voltage, poor R wave progression \par EKG 1/2022: sinus tachycardia (), borderline low voltage\par  [de-identified] : \par TTE 4/2022: LV 5.2 cm, LVEF 50-55% with global hypokinesis, normal RV size/function, estimated PASP 36 mmHg, small IVC \par \par TTE 1/14: LV 4.3 cm, LVEF 15% with relatively preserved basal motion, LVOT VTI 11 cm, grossly normal RV function, no significant valvular abnormalities: \par TTE 1/19: LV 4.3 cm, LVEF visually 20-25% with global hypokinesis worse in the apex, LVOT VTI 13 cm, normal RV size/function\par TTE 1/24: LVEF read as 20% but visually appears 25-30% and is clearly improved from presentation TTE \par  [de-identified] : \par Cardiac MRI 1/2022: LVEF 27%, RVEF 53%, increased T2 signals consistent with edema, extensive patchy LGE with circumferential pericaridal hyperenhancement suggestive of myopericarditis \par  [de-identified] : \par [facility] 1/14/22: RA 17, PA 45/33 (38), PCWP 35, PA sat 27% with [place]in CO/CI 2.8/1.4\par [facility] 1/14/22: normal coronary arteries \par \par [facility] 1/19/22 (CCU, milrinone 0.25, nipride 1.5): RA 8, PA 30/12, PCWP 11, PA sat 71% with Augustin CO/CI 7.4/3.6\par \par

## 2022-04-18 NOTE — CARDIOLOGY SUMMARY
[de-identified] : \par EKG 4/2022: sinus rhythm (HR 68), low voltage, poor R wave progression \par EKG 1/2022: sinus tachycardia (), borderline low voltage\par  [de-identified] : \par TTE 4/2022: LV 5.2 cm, LVEF 50-55% with global hypokinesis, normal RV size/function, estimated PASP 36 mmHg, small IVC \par \par TTE 1/14: LV 4.3 cm, LVEF 15% with relatively preserved basal motion, LVOT VTI 11 cm, grossly normal RV function, no significant valvular abnormalities: \par TTE 1/19: LV 4.3 cm, LVEF visually 20-25% with global hypokinesis worse in the apex, LVOT VTI 13 cm, normal RV size/function\par TTE 1/24: LVEF read as 20% but visually appears 25-30% and is clearly improved from presentation TTE \par  [de-identified] : \par Cardiac MRI 1/2022: LVEF 27%, RVEF 53%, increased T2 signals consistent with edema, extensive patchy LGE with circumferential pericaridal hyperenhancement suggestive of myopericarditis \par  [de-identified] : \par [facility] 1/14/22: RA 17, PA 45/33 (38), PCWP 35, PA sat 27% with [place]in CO/CI 2.8/1.4\par [facility] 1/14/22: normal coronary arteries \par \par [facility] 1/19/22 (CCU, milrinone 0.25, nipride 1.5): RA 8, PA 30/12, PCWP 11, PA sat 71% with Augustin CO/CI 7.4/3.6\par \par

## 2022-04-18 NOTE — PHYSICAL EXAM
[Well Developed] : well developed [Normal Conjunctiva] : normal conjunctiva [Normal Venous Pressure] : normal venous pressure [Normal S1, S2] : normal S1, S2 [No Murmur] : no murmur [Clear Lung Fields] : clear lung fields [Good Air Entry] : good air entry [Soft] : abdomen soft [Non Tender] : non-tender [Normal Gait] : normal gait [No Edema] : no edema [Moves all extremities] : moves all extremities [Alert and Oriented] : alert and oriented [Normal memory] : normal memory [de-identified] : Warm,

## 2022-04-18 NOTE — HISTORY OF PRESENT ILLNESS
[FreeTextEntry1] : 37 YO F with a history of HF with recovered LVEF due to myopericarditis, obesity (BMI 35), and chronic iron deficiency anemia from menorrhagia who is presenting to HF clinic for further management.\par \par She presented to [facility] 1/2022 with days-weeks of worsening chest pain and heart failure symptoms and found to have elevated troponin levels with diffuse nonspecific ST elevations associated with severe LV dysfunction. She was transferred to [facility] and had signs of end organ perfusion (lactate 2.4, transaminitis) and underwent R/LHC 1/14 which revealed severely elevated filing pressures and low cardiac output prompting placement of IABP. Her hemodynamics improved and IABP was successfully removed and inotropes were weaned off while initiating low doses of GDMT. Cardiac MRI was consistent with a diagnosis of myopericarditis. She remained with sinus tachycardia and borderline blood pressure but was asymptomatic and TTE showed some mild quantitative improvement. \par \par Since then she has followed up in HF clinic and has tolerated maximization of HF medical therapy. Her subsequent TTE showed normalization of LV function.\par \par She presents today for followup. She notes sensation of palpitations usually occurs 3-4 times per day and lasts seconds at a time. She describes it as a sensation of extra heart beats. Denies any syncope. She only drinks decaf coffee. Notes rare intermittent wine intake that does not effect the palpitations. Denies dyspnea on exertion but notes some pressure in the back of her neck when she is ambulating. Denies lower extremity edema. Notes  occasional orthopnea. Weight is stable at home. She notes significant anxiety. \par \par \par \par

## 2022-05-04 ENCOUNTER — NON-APPOINTMENT (OUTPATIENT)
Age: 37
End: 2022-05-04

## 2022-05-05 ENCOUNTER — NON-APPOINTMENT (OUTPATIENT)
Age: 37
End: 2022-05-05

## 2022-08-16 ENCOUNTER — APPOINTMENT (OUTPATIENT)
Dept: HEART AND VASCULAR | Facility: CLINIC | Age: 37
End: 2022-08-16

## 2022-08-16 VITALS
SYSTOLIC BLOOD PRESSURE: 103 MMHG | DIASTOLIC BLOOD PRESSURE: 61 MMHG | WEIGHT: 192.18 LBS | OXYGEN SATURATION: 97 % | HEART RATE: 82 BPM | HEIGHT: 61 IN | BODY MASS INDEX: 36.28 KG/M2

## 2022-08-16 PROCEDURE — 99214 OFFICE O/P EST MOD 30 MIN: CPT

## 2022-08-16 NOTE — PHYSICAL EXAM
[Well Developed] : well developed [Normal Conjunctiva] : normal conjunctiva [Normal Venous Pressure] : normal venous pressure [Normal S1, S2] : normal S1, S2 [No Murmur] : no murmur [Clear Lung Fields] : clear lung fields [Good Air Entry] : good air entry [Soft] : abdomen soft [Non Tender] : non-tender [Normal Gait] : normal gait [No Edema] : no edema [Moves all extremities] : moves all extremities [Alert and Oriented] : alert and oriented [Normal memory] : normal memory [de-identified] : Warm,

## 2022-08-16 NOTE — DISCUSSION/SUMMARY
[FreeTextEntry1] : # Acute systolic heart failure \par - GDMT: current regimen is entresto  mg BID, metoprolol succinate 100 mg daily, and spironolactone 25 mg daily. given recovered LVEF will defer SGLT2i. importance of longterm HF medical therapy discussed with patient \par - Diuretics: currently on lasix 40 mg daily (though stopped 1 week ago), appears euvolemic but reasonable to resume\par - Device: no indication given recovered LVEF\par - Labs: check today including BNP\par - Will repeat TTE ~4/2023 for surveillance or in 2 months if BNP elevated \par \par # Transaminitis\par - mostly resolved, will recheck today \par \par Return to clinic in 2 months, if BNP is elevated will obtain TTE this visit

## 2022-08-16 NOTE — ASSESSMENT
[FreeTextEntry1] : 35 YO F with a history of HF with recovered LVEF due to myopericarditis, obesity (BMI 35), and chronic iron deficiency anemia from menorrhagia who is presenting to HF clinic for further management.\par \par She notes increased weight gain and describing NYHA II symptoms but appears euvolemic on exam. Will continue HF medical therapy to maintain remission. Will check BNP and if elevated will repeat TTE \par \par

## 2022-08-16 NOTE — CARDIOLOGY SUMMARY
[de-identified] : \par EKG 4/2022: sinus rhythm (HR 68), low voltage, poor R wave progression \par EKG 1/2022: sinus tachycardia (), borderline low voltage\par  [de-identified] : \par 7 day opach 4/2022: HR  with average herat rate 71 bpm. <1% burden of PACs and PVCs. No VT, pauses, or AF. \par  [de-identified] : \par TTE 4/2022: LV 5.2 cm, LVEF 50-55% with global hypokinesis, normal RV size/function, estimated PASP 36 mmHg, small IVC \par \par TTE 1/14: LV 4.3 cm, LVEF 15% with relatively preserved basal motion, LVOT VTI 11 cm, grossly normal RV function, no significant valvular abnormalities: \par TTE 1/19: LV 4.3 cm, LVEF visually 20-25% with global hypokinesis worse in the apex, LVOT VTI 13 cm, normal RV size/function\par TTE 1/24: LVEF read as 20% but visually appears 25-30% and is clearly improved from presentation TTE \par  [de-identified] : \par Cardiac MRI 1/2022: LVEF 27%, RVEF 53%, increased T2 signals consistent with edema, extensive patchy LGE with circumferential pericaridal hyperenhancement suggestive of myopericarditis \par  [de-identified] : \par [facility] 1/14/22: RA 17, PA 45/33 (38), PCWP 35, PA sat 27% with Augustin CO/CI 2.8/1.4\par [facility] 1/14/22: normal coronary arteries \par \par [facility] 1/19/22 (CCU, milrinone 0.25, nipride 1.5): RA 8, PA 30/12, PCWP 11, PA sat 71% with Augustin CO/CI 7.4/3.6\par \par

## 2022-08-16 NOTE — HISTORY OF PRESENT ILLNESS
[FreeTextEntry1] : 37 YO F with a history of HF with recovered LVEF due to myopericarditis, obesity (BMI 35), and chronic iron deficiency anemia from menorrhagia who is presenting to HF clinic for further management.\par \par She presented to [facility] 1/2022 with days-weeks of worsening chest pain and heart failure symptoms and found to have elevated troponin levels with diffuse nonspecific ST elevations associated with severe LV dysfunction. She was transferred to [facility] and had signs of end organ perfusion (lactate 2.4, transaminitis) and underwent R/LHC 1/14 which revealed severely elevated filing pressures and low cardiac output prompting placement of IABP. Her hemodynamics improved and IABP was successfully removed and inotropes were weaned off while initiating low doses of GDMT. Cardiac MRI was consistent with a diagnosis of myopericarditis. She remained with sinus tachycardia and borderline blood pressure but was asymptomatic and TTE showed some mild quantitative improvement. \par \par Since then she has followed up in HF clinic and has tolerated maximization of HF medical therapy. Her subsequent TTE showed normalization of LV function.\par \par She presents today for followup. She has been gaining weight and is concerned she is retaining fluid (184->192 lbs). Of note she stopped her lasix 1 week ago because of increased urination at work. She does not feel her weight gain is related to food intake because she is not eating much. She notes occasional dyspnea when walking up the stairs quickly or doing heavy cleaning at the house. Denies orthopnea. She does note lower extremity edema which is getting worse. Palpitations have resolved. She does not feel like she did when she was admitted to the hospital and states she overall feels well. She does note intermittent fatigue and depression. \par \par \par \par \par

## 2022-08-18 LAB
ALBUMIN SERPL ELPH-MCNC: 4.7 G/DL
ALP BLD-CCNC: 136 U/L
ALT SERPL-CCNC: 108 U/L
ANION GAP SERPL CALC-SCNC: 13 MMOL/L
AST SERPL-CCNC: 38 U/L
BILIRUB SERPL-MCNC: 0.4 MG/DL
BUN SERPL-MCNC: 13 MG/DL
CALCIUM SERPL-MCNC: 9.8 MG/DL
CHLORIDE SERPL-SCNC: 102 MMOL/L
CO2 SERPL-SCNC: 23 MMOL/L
CREAT SERPL-MCNC: 0.79 MG/DL
EGFR: 99 ML/MIN/1.73M2
GLUCOSE SERPL-MCNC: 87 MG/DL
NT-PROBNP SERPL-MCNC: 576 PG/ML
POTASSIUM SERPL-SCNC: 4.2 MMOL/L
PROT SERPL-MCNC: 7 G/DL
SODIUM SERPL-SCNC: 138 MMOL/L

## 2022-10-18 ENCOUNTER — APPOINTMENT (OUTPATIENT)
Dept: HEART AND VASCULAR | Facility: CLINIC | Age: 37
End: 2022-10-18

## 2022-11-11 ENCOUNTER — APPOINTMENT (OUTPATIENT)
Dept: HEART AND VASCULAR | Facility: CLINIC | Age: 37
End: 2022-11-11

## 2022-11-11 PROCEDURE — 93306 TTE W/DOPPLER COMPLETE: CPT

## 2023-02-24 NOTE — PHYSICAL EXAM
[Well Developed] : well developed [Normal Conjunctiva] : normal conjunctiva [Normal Venous Pressure] : normal venous pressure [Normal S1, S2] : normal S1, S2 [No Murmur] : no murmur [Clear Lung Fields] : clear lung fields [Good Air Entry] : good air entry [Soft] : abdomen soft [Non Tender] : non-tender [Normal Gait] : normal gait [No Edema] : no edema [Moves all extremities] : moves all extremities [Alert and Oriented] : alert and oriented [Normal memory] : normal memory [de-identified] : Warm,

## 2023-02-24 NOTE — CARDIOLOGY SUMMARY
[de-identified] : \par EKG 4/2022: sinus rhythm (HR 68), low voltage, poor R wave progression \par EKG 1/2022: sinus tachycardia (), borderline low voltage\par  [de-identified] : \par TTE 11/2022: LV 5.0 cm, LVEF 50-55% with apical area hypokinesis, normal RV size/function, no effusion \par \par TTE 4/2022: LV 5.2 cm, LVEF 50-55% with global hypokinesis, normal RV size/function, estimated PASP 36 mmHg, small IVC \par \par TTE 1/24/20022: LV 4.3 cm, LVEF 15% with relatively preserved basal motion, LVOT VTI 11 cm, grossly normal RV function, no significant valvular abnormalities: \par TTE 1/19/2022: LV 4.3 cm, LVEF visually 20-25% with global hypokinesis worse in the apex, LVOT VTI 13 cm, normal RV size/function\par TTE 1/24/2022: LVEF read as 20% but visually appears 25-30% and is clearly improved from presentation TTE \par  [de-identified] : \par 7 day opach 4/2022: HR  with average herat rate 71 bpm. <1% burden of PACs and PVCs. No VT, pauses, or AF. \par  [de-identified] : \par Cardiac MRI 1/2022: LVEF 27%, RVEF 53%, increased T2 signals consistent with edema, extensive patchy LGE with circumferential pericaridal hyperenhancement suggestive of myopericarditis \par  [de-identified] : \par [facility] 1/14/22: RA 17, PA 45/33 (38), PCWP 35, PA sat 27% with Augustin CO/CI 2.8/1.4\par [facility] 1/14/22: normal coronary arteries \par \par [facility] 1/19/22 (CCU, milrinone 0.25, nipride 1.5): RA 8, PA 30/12, PCWP 11, PA sat 71% with Augustin CO/CI 7.4/3.6\par \par

## 2023-02-24 NOTE — HISTORY OF PRESENT ILLNESS
[FreeTextEntry1] : 35 YO F with a history of HF with recovered LVEF due to myopericarditis, obesity (BMI 35), and chronic iron deficiency anemia from menorrhagia who is presenting to HF clinic for further management.\par \par She presented to [facility] 1/2022 with days-weeks of worsening chest pain and heart failure symptoms and found to have elevated troponin levels with diffuse nonspecific ST elevations associated with severe LV dysfunction. She was transferred to [facility] and had signs of end organ perfusion (lactate 2.4, transaminitis) and underwent R/LHC 1/14 which revealed severely elevated filing pressures and low cardiac output prompting placement of IABP. Her hemodynamics improved and IABP was successfully removed and inotropes were weaned off while initiating low doses of GDMT. Cardiac MRI was consistent with a diagnosis of myopericarditis. She remained with sinus tachycardia and borderline blood pressure but was asymptomatic and TTE showed some mild quantitative improvement. \par \par Since then she has followed up in HF clinic and has tolerated maximization of HF medical therapy. Multiple subsequent TTE's have shown improved LVEF to 50-55% though does still have symptoms and elevated proBNP levels.\par \par She presents today for followup\par \par . She has been gaining weight and is concerned she is retaining fluid (184->192 lbs). Of note she stopped her lasix 1 week ago because of increased urination at work. She does not feel her weight gain is related to food intake because she is not eating much. She notes occasional dyspnea when walking up the stairs quickly or doing heavy cleaning at the house. Denies orthopnea. She does note lower extremity edema which is getting worse. Palpitations have resolved. She does not feel like she did when she was admitted to the hospital and states she overall feels well. She does note intermittent fatigue and depression. \par \par \par \par \par

## 2023-02-27 ENCOUNTER — APPOINTMENT (OUTPATIENT)
Dept: HEART AND VASCULAR | Facility: CLINIC | Age: 38
End: 2023-02-27

## 2023-05-16 ENCOUNTER — APPOINTMENT (OUTPATIENT)
Dept: HEART AND VASCULAR | Facility: CLINIC | Age: 38
End: 2023-05-16
Payer: MEDICAID

## 2023-05-16 VITALS
HEIGHT: 61 IN | HEART RATE: 97 BPM | WEIGHT: 98.5 LBS | SYSTOLIC BLOOD PRESSURE: 115 MMHG | DIASTOLIC BLOOD PRESSURE: 73 MMHG | OXYGEN SATURATION: 96 % | BODY MASS INDEX: 18.6 KG/M2 | TEMPERATURE: 98.5 F

## 2023-05-16 PROCEDURE — 99214 OFFICE O/P EST MOD 30 MIN: CPT

## 2023-05-16 RX ORDER — FUROSEMIDE 40 MG/1
40 TABLET ORAL DAILY
Qty: 30 | Refills: 3 | Status: DISCONTINUED | COMMUNITY
Start: 2022-02-09 | End: 2023-05-16

## 2023-05-16 NOTE — PHYSICAL EXAM
[Well Developed] : well developed [Normal Conjunctiva] : normal conjunctiva [Normal Venous Pressure] : normal venous pressure [Normal S1, S2] : normal S1, S2 [No Murmur] : no murmur [Clear Lung Fields] : clear lung fields [Good Air Entry] : good air entry [Soft] : abdomen soft [Non Tender] : non-tender [Normal Gait] : normal gait [No Edema] : no edema [Moves all extremities] : moves all extremities [Alert and Oriented] : alert and oriented [Normal memory] : normal memory [de-identified] : Warm,

## 2023-05-16 NOTE — CARDIOLOGY SUMMARY
[de-identified] : \par EKG 4/2022: sinus rhythm (HR 68), low voltage, poor R wave progression \par EKG 1/2022: sinus tachycardia (), borderline low voltage\par  [de-identified] : \par 7 day opach 4/2022: HR  with average herat rate 71 bpm. <1% burden of PACs and PVCs. No VT, pauses, or AF. \par  [de-identified] : \par TTE 11/2022: LV 5.0 cm, LVEF 50-55% with apical area hypokinesis, normal RV size/function, no effusion \par \par TTE 4/2022: LV 5.2 cm, LVEF 50-55% with global hypokinesis, normal RV size/function, estimated PASP 36 mmHg, small IVC \par \par TTE 1/24/20022: LV 4.3 cm, LVEF 15% with relatively preserved basal motion, LVOT VTI 11 cm, grossly normal RV function, no significant valvular abnormalities: \par TTE 1/19/2022: LV 4.3 cm, LVEF visually 20-25% with global hypokinesis worse in the apex, LVOT VTI 13 cm, normal RV size/function\par TTE 1/24/2022: LVEF read as 20% but visually appears 25-30% and is clearly improved from presentation TTE \par  [de-identified] : \par Cardiac MRI 1/2022: LVEF 27%, RVEF 53%, increased T2 signals consistent with edema, extensive patchy LGE with circumferential pericaridal hyperenhancement suggestive of myopericarditis \par  [de-identified] : \par [facility] 1/14/22: RA 17, PA 45/33 (38), PCWP 35, PA sat 27% with Augustin CO/CI 2.8/1.4\par [facility] 1/14/22: normal coronary arteries \par \par [facility] 1/19/22 (CCU, milrinone 0.25, nipride 1.5): RA 8, PA 30/12, PCWP 11, PA sat 71% with Augustin CO/CI 7.4/3.6\par \par

## 2023-05-16 NOTE — ASSESSMENT
[FreeTextEntry1] : 38 YO F with a history of HF with recovered LVEF due to myopericarditis, obesity (BMI 35), and chronic iron deficiency anemia from menorrhagia who is presenting to HF clinic for further management.\par \par Today she reports NYHA II-III symptoms but appears euvolemic on exam. Will reassess BNP and TTE to evaluate for remission. \par \par

## 2023-05-16 NOTE — DISCUSSION/SUMMARY
[FreeTextEntry1] : # Acute systolic heart failure \par - GDMT: current regimen is entresto  mg BID. will resume metoprolol succinate 100 mg daily and spironolactone 25 mg daily. given recovered LVEF will defer SGLT2i but reconsider based on repeat TTE. importance of longterm HF medical therapy discussed with patient \par - Diuretics: euvolemic off diuretics \par - Device: no indication given recovered LVEF\par - Labs: check today including BNP\par - Will repeat TTE before next visit \par \par # Transaminitis\par - worsened transaminitis when labs last checked, may have been related to EtOH use at the time which she admits to \par - repeat labs today \par \par Return to clinic in 4 months, TTE in the next 2-4 weeks and will call with results

## 2023-05-16 NOTE — HISTORY OF PRESENT ILLNESS
[FreeTextEntry1] : 36 YO F with a history of HF with recovered LVEF due to myopericarditis, obesity (BMI 35), and chronic iron deficiency anemia from menorrhagia who is presenting to HF clinic for further management.\par \par She presented to [facility] 1/2022 with days-weeks of worsening chest pain and heart failure symptoms and found to have elevated troponin levels with diffuse nonspecific ST elevations associated with severe LV dysfunction. She was transferred to [facility] and had signs of end organ perfusion (lactate 2.4, transaminitis) and underwent R/LHC 1/14 which revealed severely elevated filing pressures and low cardiac output prompting placement of IABP. Her hemodynamics improved and IABP was successfully removed and inotropes were weaned off while initiating low doses of GDMT. Cardiac MRI was consistent with a diagnosis of myopericarditis. She remained with sinus tachycardia and borderline blood pressure but was asymptomatic and TTE showed some mild quantitative improvement. \par \par Since then she has followed up in HF clinic and has tolerated maximization of HF medical therapy. Multiple subsequent TTE's have shown improved LVEF to 50-55% though does still have symptoms and elevated proBNP levels (however downtrending). She has had suboptimal compliance with followup. \par \par She presents today for followup. Of note, she tried on her own to stop her entresto for 5 days but led to recurrent dyspnea so she resumed it. She stopped her lasix due to increased urination. She has stopped her metoprolol on her own because she felt it was leading to weight gain. She notes dyspnea which occurs after walking a 1/2 block. Notes orthopnea as well. Notes intermittent edema. Denies dizziness or LH. Denies chest pain but does not some chest pressure at times with exertion. \par \par \par \par \par \par

## 2023-05-17 LAB
ALBUMIN SERPL ELPH-MCNC: 4.4 G/DL
ALP BLD-CCNC: 86 U/L
ALT SERPL-CCNC: 40 U/L
ANION GAP SERPL CALC-SCNC: 11 MMOL/L
AST SERPL-CCNC: 23 U/L
BILIRUB SERPL-MCNC: 0.4 MG/DL
BUN SERPL-MCNC: 11 MG/DL
CALCIUM SERPL-MCNC: 9.5 MG/DL
CHLORIDE SERPL-SCNC: 104 MMOL/L
CO2 SERPL-SCNC: 25 MMOL/L
CREAT SERPL-MCNC: 0.85 MG/DL
EGFR: 90 ML/MIN/1.73M2
GLUCOSE SERPL-MCNC: 94 MG/DL
NT-PROBNP SERPL-MCNC: 362 PG/ML
POTASSIUM SERPL-SCNC: 3.9 MMOL/L
PROT SERPL-MCNC: 6.7 G/DL
SODIUM SERPL-SCNC: 140 MMOL/L

## 2023-05-24 ENCOUNTER — NON-APPOINTMENT (OUTPATIENT)
Age: 38
End: 2023-05-24

## 2023-06-02 ENCOUNTER — APPOINTMENT (OUTPATIENT)
Dept: HEART AND VASCULAR | Facility: CLINIC | Age: 38
End: 2023-06-02
Payer: MEDICAID

## 2023-06-02 DIAGNOSIS — I50.9 HEART FAILURE, UNSPECIFIED: ICD-10-CM

## 2023-06-02 PROCEDURE — 93306 TTE W/DOPPLER COMPLETE: CPT

## 2023-06-14 PROBLEM — I50.9 CHF (NYHA CLASS II, ACC/AHA STAGE C): Status: ACTIVE | Noted: 2022-02-01

## 2023-06-15 ENCOUNTER — NON-APPOINTMENT (OUTPATIENT)
Age: 38
End: 2023-06-15

## 2023-07-11 NOTE — DIETITIAN INITIAL EVALUATION ADULT. - BODY MASS INDEX
[No Acute Distress] : no acute distress [Normal Oropharynx] : normal oropharynx [III] : Mallampati Class: III [Normal Appearance] : normal appearance [No Neck Mass] : no neck mass [Normal Rate/Rhythm] : normal rate/rhythm [Normal S1, S2] : normal s1, s2 [No Murmurs] : no murmurs 40.9 [No Resp Distress] : no resp distress [Clear to Auscultation Bilaterally] : clear to auscultation bilaterally [No Abnormalities] : no abnormalities [Benign] : benign [Normal Gait] : normal gait [No Clubbing] : no clubbing [No Cyanosis] : no cyanosis [No Edema] : no edema [FROM] : FROM [Normal Color/ Pigmentation] : normal color/ pigmentation [No Focal Deficits] : no focal deficits [Oriented x3] : oriented x3 [Normal Affect] : normal affect

## 2023-12-31 NOTE — PROGRESS NOTE ADULT - PROBLEM/PLAN-4
Thank you for allowing us to care for you.  You presented with a 2-week history of cough and shortness of breath.  We tested you for COVID RSV and flu which were all negative except your COVID was positive.  Given that you have a history of asthma and bronchitis we will treat you for a mild exacerbation with albuterol inhaler as well as prednisone 40 mg once daily.    Please take as directed  We hope you feel better  
DISPLAY PLAN FREE TEXT

## 2024-01-02 ENCOUNTER — APPOINTMENT (OUTPATIENT)
Dept: HEART AND VASCULAR | Facility: CLINIC | Age: 39
End: 2024-01-02
Payer: MEDICAID

## 2024-01-02 VITALS
HEIGHT: 61 IN | SYSTOLIC BLOOD PRESSURE: 110 MMHG | DIASTOLIC BLOOD PRESSURE: 80 MMHG | WEIGHT: 204 LBS | BODY MASS INDEX: 38.51 KG/M2 | OXYGEN SATURATION: 96 % | TEMPERATURE: 98.5 F | HEART RATE: 96 BPM

## 2024-01-02 PROCEDURE — 99213 OFFICE O/P EST LOW 20 MIN: CPT

## 2024-01-02 RX ORDER — CARVEDILOL 3.12 MG/1
3.12 TABLET, FILM COATED ORAL
Qty: 180 | Refills: 3 | Status: ACTIVE | COMMUNITY
Start: 2024-01-02 | End: 1900-01-01

## 2024-01-02 RX ORDER — SPIRONOLACTONE 25 MG/1
25 TABLET ORAL DAILY
Qty: 90 | Refills: 3 | Status: ACTIVE | COMMUNITY
Start: 1900-01-01 | End: 1900-01-01

## 2024-01-02 RX ORDER — SACUBITRIL AND VALSARTAN 97; 103 MG/1; MG/1
97-103 TABLET, FILM COATED ORAL TWICE DAILY
Qty: 60 | Refills: 3 | Status: ACTIVE | COMMUNITY
Start: 1900-01-01 | End: 1900-01-01

## 2024-01-02 NOTE — PHYSICAL EXAM
[Well Developed] : well developed [Normal Conjunctiva] : normal conjunctiva [Normal Venous Pressure] : normal venous pressure [Normal S1, S2] : normal S1, S2 [No Murmur] : no murmur [Clear Lung Fields] : clear lung fields [Good Air Entry] : good air entry [Soft] : abdomen soft [Non Tender] : non-tender [Normal Gait] : normal gait [No Edema] : no edema [Moves all extremities] : moves all extremities [Alert and Oriented] : alert and oriented [Normal memory] : normal memory [de-identified] : Warm,  yes

## 2024-01-02 NOTE — HISTORY OF PRESENT ILLNESS
[FreeTextEntry1] : 37 YO F with a history of HF with improved LVEF due to myopericarditis (LVEF 15->50%), obesity (BMI 35), and chronic iron deficiency anemia from menorrhagia who is presenting to HF clinic for further management.  She presented to [facility] 1/2022 with days-weeks of worsening chest pain and heart failure symptoms and found to have elevated troponin levels with diffuse nonspecific ST elevations associated with severe LV dysfunction. She was transferred to [facility] and had signs of end organ perfusion (lactate 2.4, transaminitis) and underwent R/LHC 1/14 which revealed severely elevated filing pressures and low cardiac output prompting placement of IABP. Her hemodynamics improved and IABP was successfully removed and inotropes were weaned off while initiating low doses of GDMT. Cardiac MRI was consistent with a diagnosis of myopericarditis. She remained with sinus tachycardia and borderline blood pressure but was asymptomatic and TTE showed some mild quantitative improvement.   Since then she has followed up in HF clinic and has tolerated maximization of HF medical therapy. Multiple subsequent TTE's have shown improved LVEF to 50% and her proBNP has been downtrending. She does continue to note some cardiac symptoms however.   She presents today for followup. Of note, she stopped her metoprolol which caused fatigue. She had a significant URI a few weeks ago but now recovered. She has had some intermittent lightheadedness. She has had recent palpitations since her URI for the past 3 nights. She notes intermittent dyspnea which occurs after a long day of work. She can walk unlimited flights of stairs or blocks however without dyspnea. Denies chest pain or chest pressure.

## 2024-01-02 NOTE — ASSESSMENT
[FreeTextEntry1] : 39 YO F with a history of HF with improved LVEF due to myopericarditis (LVEF 15->50%), obesity (BMI 35), and chronic iron deficiency anemia from menorrhagia who is presenting to HF clinic for further management.  Today she reports NYHA II symptoms and appears euvolemic on exam.

## 2024-01-02 NOTE — CARDIOLOGY SUMMARY
[de-identified] : \par  EKG 4/2022: sinus rhythm (HR 68), low voltage, poor R wave progression \par  EKG 1/2022: sinus tachycardia (), borderline low voltage\par   [de-identified] : \par  7 day opach 4/2022: HR  with average herat rate 71 bpm. <1% burden of PACs and PVCs. No VT, pauses, or AF. \par   [de-identified] : TTE 6/2023:  6/2022: LV 4.0 cm, LVEF 45-50% with apical area hypokinesis, normal RV size/function, grade 1 diastolic dysfunction, PASP 28 mmHg, RA pressure 5 mmHg. TTE 11/2022: LV 5.0 cm, LVEF 50-55% with apical area hypokinesis, normal RV size/function, no effusion   TTE 4/2022: LV 5.2 cm, LVEF 50-55% with global hypokinesis, normal RV size/function, estimated PASP 36 mmHg, small IVC   TTE 1/24/20022: LV 4.3 cm, LVEF 15% with relatively preserved basal motion, LVOT VTI 11 cm, grossly normal RV function, no significant valvular abnormalities:  TTE 1/19/2022: LV 4.3 cm, LVEF visually 20-25% with global hypokinesis worse in the apex, LVOT VTI 13 cm, normal RV size/function TTE 1/24/2022: LVEF read as 20% but visually appears 25-30% and is clearly improved from presentation TTE   [de-identified] : \par  Cardiac MRI 1/2022: LVEF 27%, RVEF 53%, increased T2 signals consistent with edema, extensive patchy LGE with circumferential pericaridal hyperenhancement suggestive of myopericarditis \par   [de-identified] : \par  [facility] 1/14/22: RA 17, PA 45/33 (38), PCWP 35, PA sat 27% with [place] CO/CI 2.8/1.4\par  [facility] 1/14/22: normal coronary arteries \par  \par  [facility] 1/19/22 (CCU, milrinone 0.25, nipride 1.5): RA 8, PA 30/12, PCWP 11, PA sat 71% with [place] CO/CI 7.4/3.6\par  \par

## 2024-01-02 NOTE — DISCUSSION/SUMMARY
[FreeTextEntry1] : # Acute systolic heart failure  - GDMT: current regimen is entresto  mg BID and spironolactone 25 mg daily. she stopped her metoprolol because of fatigue. will trial carvedilol 3.125 mg BID. given recovered LVEF and tendency to run hypovolemic will defer SGLT2i - Diuretics: euvolemic off diuretics  - Device: no indication given recovered LVEF - Labs: check today including BNP - Repeat TTE next visit (last 6/2023)  # Palpitations - Will repeat 7 day Ziopatch   Return to clinic in 6 months with TTE

## 2024-01-03 LAB
ALBUMIN SERPL ELPH-MCNC: 4.6 G/DL
ALP BLD-CCNC: 123 U/L
ALT SERPL-CCNC: 32 U/L
ANION GAP SERPL CALC-SCNC: 11 MMOL/L
AST SERPL-CCNC: 31 U/L
BILIRUB SERPL-MCNC: 0.2 MG/DL
BUN SERPL-MCNC: 14 MG/DL
CALCIUM SERPL-MCNC: 9.3 MG/DL
CHLORIDE SERPL-SCNC: 104 MMOL/L
CO2 SERPL-SCNC: 22 MMOL/L
CREAT SERPL-MCNC: 0.88 MG/DL
EGFR: 86 ML/MIN/1.73M2
GLUCOSE SERPL-MCNC: 96 MG/DL
NT-PROBNP SERPL-MCNC: 218 PG/ML
POTASSIUM SERPL-SCNC: 4.4 MMOL/L
PROT SERPL-MCNC: 7 G/DL
SODIUM SERPL-SCNC: 137 MMOL/L

## 2024-01-24 DIAGNOSIS — B33.22 VIRAL MYOCARDITIS: ICD-10-CM

## 2024-01-31 ENCOUNTER — APPOINTMENT (OUTPATIENT)
Dept: MRI IMAGING | Facility: CLINIC | Age: 39
End: 2024-01-31
Payer: MEDICAID

## 2024-01-31 ENCOUNTER — OUTPATIENT (OUTPATIENT)
Dept: OUTPATIENT SERVICES | Facility: HOSPITAL | Age: 39
LOS: 1 days | End: 2024-01-31

## 2024-01-31 ENCOUNTER — RESULT REVIEW (OUTPATIENT)
Age: 39
End: 2024-01-31

## 2024-01-31 PROCEDURE — 75561 CARDIAC MRI FOR MORPH W/DYE: CPT | Mod: 26

## 2024-01-31 PROCEDURE — 75565 CARD MRI VELOC FLOW MAPPING: CPT | Mod: 26

## 2024-02-07 ENCOUNTER — NON-APPOINTMENT (OUTPATIENT)
Age: 39
End: 2024-02-07

## 2024-07-08 ENCOUNTER — APPOINTMENT (OUTPATIENT)
Dept: HEART FAILURE | Facility: CLINIC | Age: 39
End: 2024-07-08
Payer: MEDICAID

## 2024-07-08 ENCOUNTER — APPOINTMENT (OUTPATIENT)
Dept: HEART AND VASCULAR | Facility: CLINIC | Age: 39
End: 2024-07-08
Payer: MEDICAID

## 2024-07-08 VITALS
HEART RATE: 106 BPM | RESPIRATION RATE: 96 BRPM | WEIGHT: 203 LBS | SYSTOLIC BLOOD PRESSURE: 116 MMHG | HEIGHT: 61 IN | BODY MASS INDEX: 38.33 KG/M2 | DIASTOLIC BLOOD PRESSURE: 80 MMHG

## 2024-07-08 PROCEDURE — 99213 OFFICE O/P EST LOW 20 MIN: CPT | Mod: 25

## 2024-07-08 PROCEDURE — 93306 TTE W/DOPPLER COMPLETE: CPT

## 2024-07-08 PROCEDURE — G2211 COMPLEX E/M VISIT ADD ON: CPT | Mod: NC,1L

## 2024-07-08 RX ORDER — EMPAGLIFLOZIN 10 MG/1
10 TABLET, FILM COATED ORAL
Qty: 90 | Refills: 3 | Status: ACTIVE | COMMUNITY
Start: 2024-07-08 | End: 1900-01-01

## 2024-07-12 ENCOUNTER — NON-APPOINTMENT (OUTPATIENT)
Age: 39
End: 2024-07-12

## 2024-07-12 LAB
ALBUMIN SERPL ELPH-MCNC: 4.5 G/DL
ALP BLD-CCNC: 65 U/L
ALT SERPL-CCNC: 18 U/L
ANION GAP SERPL CALC-SCNC: 12 MMOL/L
AST SERPL-CCNC: 19 U/L
BILIRUB SERPL-MCNC: 0.6 MG/DL
BUN SERPL-MCNC: 9 MG/DL
CALCIUM SERPL-MCNC: 9.8 MG/DL
CHLORIDE SERPL-SCNC: 102 MMOL/L
CO2 SERPL-SCNC: 24 MMOL/L
CREAT SERPL-MCNC: 0.83 MG/DL
EGFR: 92 ML/MIN/1.73M2
GLUCOSE SERPL-MCNC: 91 MG/DL
NT-PROBNP SERPL-MCNC: 307 PG/ML
POTASSIUM SERPL-SCNC: 4.1 MMOL/L
PROT SERPL-MCNC: 7.1 G/DL
SODIUM SERPL-SCNC: 138 MMOL/L

## 2024-10-05 NOTE — PROGRESS NOTE ADULT - ASSESSMENT
, 36 y/oF, PMHx  morbid obesity, chronic anemia from menorrhagia, initially admitted to [facility] 1/9/22 for acute systolic CHF likely 2/2 myocarditis c/b cardiogenic shock; transferred to [facility] CCU 1/13/22 for further management; s/p pressor support and IABP, initiated GDMT, stepped down to [facility] 1/22 for continued medical optimization

## 2025-01-06 ENCOUNTER — APPOINTMENT (OUTPATIENT)
Dept: HEART FAILURE | Facility: CLINIC | Age: 40
End: 2025-01-06

## 2025-01-06 ENCOUNTER — APPOINTMENT (OUTPATIENT)
Dept: HEART AND VASCULAR | Facility: CLINIC | Age: 40
End: 2025-01-06
Payer: MEDICAID

## 2025-01-06 VITALS
TEMPERATURE: 98.2 F | OXYGEN SATURATION: 97 % | WEIGHT: 193 LBS | SYSTOLIC BLOOD PRESSURE: 100 MMHG | DIASTOLIC BLOOD PRESSURE: 70 MMHG | HEIGHT: 61 IN | HEART RATE: 94 BPM | BODY MASS INDEX: 36.44 KG/M2

## 2025-01-06 DIAGNOSIS — I50.9 HEART FAILURE, UNSPECIFIED: ICD-10-CM

## 2025-01-06 DIAGNOSIS — R00.2 PALPITATIONS: ICD-10-CM

## 2025-01-06 DIAGNOSIS — B33.22 VIRAL MYOCARDITIS: ICD-10-CM

## 2025-01-06 PROCEDURE — G2211 COMPLEX E/M VISIT ADD ON: CPT | Mod: NC

## 2025-01-06 PROCEDURE — 99214 OFFICE O/P EST MOD 30 MIN: CPT

## 2025-01-06 RX ORDER — ORAL SEMAGLUTIDE 3 MG/1
3 TABLET ORAL
Refills: 0 | Status: ACTIVE | COMMUNITY
Start: 2025-01-06

## 2025-01-08 ENCOUNTER — NON-APPOINTMENT (OUTPATIENT)
Age: 40
End: 2025-01-08

## 2025-01-08 LAB
ANION GAP SERPL CALC-SCNC: 15 MMOL/L
BUN SERPL-MCNC: 9 MG/DL
CALCIUM SERPL-MCNC: 10.2 MG/DL
CHLORIDE SERPL-SCNC: 102 MMOL/L
CO2 SERPL-SCNC: 22 MMOL/L
CREAT SERPL-MCNC: 0.81 MG/DL
EGFR: 95 ML/MIN/1.73M2
GLUCOSE SERPL-MCNC: 105 MG/DL
NT-PROBNP SERPL-MCNC: 134 PG/ML
POTASSIUM SERPL-SCNC: 4.1 MMOL/L
SODIUM SERPL-SCNC: 139 MMOL/L

## 2025-01-15 ENCOUNTER — NON-APPOINTMENT (OUTPATIENT)
Age: 40
End: 2025-01-15

## 2025-01-30 ENCOUNTER — TRANSCRIPTION ENCOUNTER (OUTPATIENT)
Age: 40
End: 2025-01-30

## 2025-07-21 ENCOUNTER — APPOINTMENT (OUTPATIENT)
Dept: HEART FAILURE | Facility: CLINIC | Age: 40
End: 2025-07-21
Payer: MEDICAID

## 2025-07-21 ENCOUNTER — APPOINTMENT (OUTPATIENT)
Dept: HEART AND VASCULAR | Facility: CLINIC | Age: 40
End: 2025-07-21
Payer: MEDICAID

## 2025-07-21 VITALS
SYSTOLIC BLOOD PRESSURE: 98 MMHG | OXYGEN SATURATION: 99 % | BODY MASS INDEX: 35.68 KG/M2 | DIASTOLIC BLOOD PRESSURE: 62 MMHG | HEART RATE: 94 BPM | TEMPERATURE: 97.8 F | HEIGHT: 61 IN | WEIGHT: 189 LBS

## 2025-07-21 LAB
ALBUMIN SERPL ELPH-MCNC: 4.6 G/DL
ALP BLD-CCNC: 66 U/L
ALT SERPL-CCNC: 20 U/L
ANION GAP SERPL CALC-SCNC: 13 MMOL/L
AST SERPL-CCNC: 22 U/L
BILIRUB SERPL-MCNC: 0.4 MG/DL
BUN SERPL-MCNC: 8 MG/DL
CALCIUM SERPL-MCNC: 9.5 MG/DL
CHLORIDE SERPL-SCNC: 105 MMOL/L
CO2 SERPL-SCNC: 22 MMOL/L
CREAT SERPL-MCNC: 0.84 MG/DL
CRP SERPL-MCNC: <3 MG/L
EGFRCR SERPLBLD CKD-EPI 2021: 91 ML/MIN/1.73M2
ERYTHROCYTE [SEDIMENTATION RATE] IN BLOOD BY WESTERGREN METHOD: 7 MM/HR
GLUCOSE SERPL-MCNC: 87 MG/DL
NT-PROBNP SERPL-MCNC: 144 PG/ML
POTASSIUM SERPL-SCNC: 4.2 MMOL/L
PROT SERPL-MCNC: 7.1 G/DL
SODIUM SERPL-SCNC: 140 MMOL/L
TROPONIN-T, HIGH SENSITIVITY: 11 NG/L

## 2025-07-21 PROCEDURE — 99214 OFFICE O/P EST MOD 30 MIN: CPT | Mod: 25

## 2025-07-21 PROCEDURE — 93306 TTE W/DOPPLER COMPLETE: CPT

## 2025-07-21 PROCEDURE — 93000 ELECTROCARDIOGRAM COMPLETE: CPT

## 2025-07-28 ENCOUNTER — NON-APPOINTMENT (OUTPATIENT)
Age: 40
End: 2025-07-28